# Patient Record
Sex: MALE | Race: OTHER | HISPANIC OR LATINO
[De-identification: names, ages, dates, MRNs, and addresses within clinical notes are randomized per-mention and may not be internally consistent; named-entity substitution may affect disease eponyms.]

---

## 2017-09-18 ENCOUNTER — APPOINTMENT (OUTPATIENT)
Dept: UROLOGY | Facility: CLINIC | Age: 57
End: 2017-09-18
Payer: MEDICAID

## 2017-09-18 VITALS
TEMPERATURE: 98.5 F | WEIGHT: 169 LBS | DIASTOLIC BLOOD PRESSURE: 72 MMHG | BODY MASS INDEX: 28.85 KG/M2 | SYSTOLIC BLOOD PRESSURE: 108 MMHG | HEART RATE: 71 BPM | HEIGHT: 64 IN

## 2017-09-18 DIAGNOSIS — F17.200 NICOTINE DEPENDENCE, UNSPECIFIED, UNCOMPLICATED: ICD-10-CM

## 2017-09-18 PROCEDURE — 99204 OFFICE O/P NEW MOD 45 MIN: CPT | Mod: 25

## 2017-09-18 PROCEDURE — 51798 US URINE CAPACITY MEASURE: CPT

## 2017-09-19 PROBLEM — F17.200 CURRENT EVERY DAY SMOKER: Status: ACTIVE | Noted: 2017-09-19

## 2017-09-19 LAB
APPEARANCE: CLEAR
BACTERIA: NEGATIVE
BILIRUBIN URINE: NEGATIVE
BLOOD URINE: NEGATIVE
COLOR: YELLOW
GLUCOSE QUALITATIVE U: 1000 MG/DL
KETONES URINE: NEGATIVE
LEUKOCYTE ESTERASE URINE: NEGATIVE
MICROSCOPIC-UA: NORMAL
NITRITE URINE: NEGATIVE
PH URINE: 6
PROTEIN URINE: NEGATIVE MG/DL
RED BLOOD CELLS URINE: 0 /HPF
SPECIFIC GRAVITY URINE: 1.04
SQUAMOUS EPITHELIAL CELLS: 0 /HPF
UROBILINOGEN URINE: NORMAL MG/DL
WHITE BLOOD CELLS URINE: 0 /HPF

## 2017-09-20 LAB — BACTERIA UR CULT: NORMAL

## 2017-11-20 ENCOUNTER — APPOINTMENT (OUTPATIENT)
Dept: UROLOGY | Facility: CLINIC | Age: 57
End: 2017-11-20
Payer: MEDICAID

## 2017-11-20 VITALS — TEMPERATURE: 99.1 F | SYSTOLIC BLOOD PRESSURE: 115 MMHG | HEART RATE: 69 BPM | DIASTOLIC BLOOD PRESSURE: 71 MMHG

## 2017-11-20 PROCEDURE — 99213 OFFICE O/P EST LOW 20 MIN: CPT

## 2018-03-05 ENCOUNTER — RX RENEWAL (OUTPATIENT)
Age: 58
End: 2018-03-05

## 2018-04-10 ENCOUNTER — APPOINTMENT (OUTPATIENT)
Dept: ORTHOPEDIC SURGERY | Facility: CLINIC | Age: 58
End: 2018-04-10
Payer: MEDICAID

## 2018-04-10 VITALS
DIASTOLIC BLOOD PRESSURE: 73 MMHG | HEIGHT: 68 IN | HEART RATE: 70 BPM | BODY MASS INDEX: 24.71 KG/M2 | WEIGHT: 163 LBS | SYSTOLIC BLOOD PRESSURE: 110 MMHG

## 2018-04-10 PROCEDURE — 20610 DRAIN/INJ JOINT/BURSA W/O US: CPT | Mod: LT

## 2018-04-10 PROCEDURE — 73564 X-RAY EXAM KNEE 4 OR MORE: CPT | Mod: LT

## 2018-04-10 PROCEDURE — 99204 OFFICE O/P NEW MOD 45 MIN: CPT | Mod: 25

## 2018-04-10 PROCEDURE — 73560 X-RAY EXAM OF KNEE 1 OR 2: CPT | Mod: RT

## 2018-05-08 ENCOUNTER — APPOINTMENT (OUTPATIENT)
Dept: ORTHOPEDIC SURGERY | Facility: CLINIC | Age: 58
End: 2018-05-08
Payer: MEDICAID

## 2018-05-08 VITALS
SYSTOLIC BLOOD PRESSURE: 133 MMHG | WEIGHT: 163 LBS | HEART RATE: 79 BPM | HEIGHT: 68 IN | BODY MASS INDEX: 24.71 KG/M2 | DIASTOLIC BLOOD PRESSURE: 82 MMHG

## 2018-05-08 DIAGNOSIS — Z78.9 OTHER SPECIFIED HEALTH STATUS: ICD-10-CM

## 2018-05-08 DIAGNOSIS — Z87.39 PERSONAL HISTORY OF OTHER DISEASES OF THE MUSCULOSKELETAL SYSTEM AND CONNECTIVE TISSUE: ICD-10-CM

## 2018-05-08 DIAGNOSIS — Z00.00 ENCOUNTER FOR GENERAL ADULT MEDICAL EXAMINATION W/OUT ABNORMAL FINDINGS: ICD-10-CM

## 2018-05-08 PROCEDURE — 99213 OFFICE O/P EST LOW 20 MIN: CPT

## 2018-05-08 PROCEDURE — 73562 X-RAY EXAM OF KNEE 3: CPT | Mod: RT

## 2018-05-22 ENCOUNTER — MESSAGE (OUTPATIENT)
Age: 58
End: 2018-05-22

## 2018-06-04 ENCOUNTER — APPOINTMENT (OUTPATIENT)
Dept: UROLOGY | Facility: CLINIC | Age: 58
End: 2018-06-04
Payer: MEDICAID

## 2018-06-04 VITALS — TEMPERATURE: 98.2 F | SYSTOLIC BLOOD PRESSURE: 146 MMHG | HEART RATE: 93 BPM | DIASTOLIC BLOOD PRESSURE: 80 MMHG

## 2018-06-04 PROCEDURE — 99213 OFFICE O/P EST LOW 20 MIN: CPT

## 2018-06-05 LAB
PSA FREE FLD-MCNC: 38.6
PSA FREE SERPL-MCNC: 0.22 NG/ML
PSA SERPL-MCNC: 0.57 NG/ML

## 2018-06-07 ENCOUNTER — RX RENEWAL (OUTPATIENT)
Age: 58
End: 2018-06-07

## 2018-09-18 ENCOUNTER — APPOINTMENT (OUTPATIENT)
Dept: ORTHOPEDIC SURGERY | Facility: CLINIC | Age: 58
End: 2018-09-18
Payer: MEDICAID

## 2018-09-18 VITALS
DIASTOLIC BLOOD PRESSURE: 71 MMHG | HEART RATE: 75 BPM | SYSTOLIC BLOOD PRESSURE: 108 MMHG | BODY MASS INDEX: 27.83 KG/M2 | HEIGHT: 64 IN | WEIGHT: 163 LBS

## 2018-09-18 DIAGNOSIS — M23.92 UNSPECIFIED INTERNAL DERANGEMENT OF LEFT KNEE: ICD-10-CM

## 2018-09-18 PROCEDURE — 99213 OFFICE O/P EST LOW 20 MIN: CPT

## 2018-10-02 ENCOUNTER — RESULT REVIEW (OUTPATIENT)
Age: 58
End: 2018-10-02

## 2018-10-16 ENCOUNTER — APPOINTMENT (OUTPATIENT)
Dept: ORTHOPEDIC SURGERY | Facility: CLINIC | Age: 58
End: 2018-10-16
Payer: MEDICAID

## 2018-10-16 VITALS
WEIGHT: 162 LBS | SYSTOLIC BLOOD PRESSURE: 118 MMHG | HEIGHT: 64 IN | DIASTOLIC BLOOD PRESSURE: 68 MMHG | HEART RATE: 81 BPM | BODY MASS INDEX: 27.66 KG/M2 | RESPIRATION RATE: 98 BRPM

## 2018-10-16 PROCEDURE — 99214 OFFICE O/P EST MOD 30 MIN: CPT

## 2018-12-17 ENCOUNTER — APPOINTMENT (OUTPATIENT)
Dept: UROLOGY | Facility: CLINIC | Age: 58
End: 2018-12-17

## 2019-01-03 VITALS
HEART RATE: 64 BPM | TEMPERATURE: 98 F | DIASTOLIC BLOOD PRESSURE: 60 MMHG | WEIGHT: 141.1 LBS | SYSTOLIC BLOOD PRESSURE: 100 MMHG | RESPIRATION RATE: 16 BRPM | HEIGHT: 64 IN

## 2019-01-03 NOTE — ASU PATIENT PROFILE, ADULT - PAIN DESCRIPTION (FREQUENCY/QUALITY), PROFILE
Pt with recent cold symptoms, possibly with fever on Sunday; pt did not eat bfast this morning.     Today at school pt was on stairs and states that everything \"went black\", when she woke up she was at the bottom of the stairs (about 8-10 stairs); pt c/o
intermittent

## 2019-01-03 NOTE — ASU PATIENT PROFILE, ADULT - LONGEST PERIOD TOBACCO-FREE
smoker socially currently , quit smoking 30 yrs ago, started smoking 9 yrs old , 4-5 cigarettes a day

## 2019-01-03 NOTE — ASU PATIENT PROFILE, ADULT - PMH
Allergic rhinitis    Arthritis    BPH (benign prostatic hyperplasia)    Cataract    Cough variant asthma  pt denies  Depression    DM (diabetes mellitus)  type 2  ED (erectile dysfunction)    Esophageal reflux    Essential tremor    HLD (hyperlipidemia)    HTN (hypertension)    Hypertensive retinopathy    Hypertriglyceridemia    Rectal bleeding

## 2019-01-04 ENCOUNTER — APPOINTMENT (OUTPATIENT)
Dept: ORTHOPEDIC SURGERY | Facility: HOSPITAL | Age: 59
End: 2019-01-04

## 2019-01-04 ENCOUNTER — RESULT REVIEW (OUTPATIENT)
Age: 59
End: 2019-01-04

## 2019-01-04 ENCOUNTER — OUTPATIENT (OUTPATIENT)
Dept: OUTPATIENT SERVICES | Facility: HOSPITAL | Age: 59
LOS: 1 days | Discharge: HOME | End: 2019-01-04
Payer: MEDICAID

## 2019-01-04 VITALS
HEART RATE: 62 BPM | SYSTOLIC BLOOD PRESSURE: 101 MMHG | RESPIRATION RATE: 17 BRPM | DIASTOLIC BLOOD PRESSURE: 66 MMHG | OXYGEN SATURATION: 96 %

## 2019-01-04 DIAGNOSIS — Z98.890 OTHER SPECIFIED POSTPROCEDURAL STATES: Chronic | ICD-10-CM

## 2019-01-04 DIAGNOSIS — M17.12 UNILATERAL PRIMARY OSTEOARTHRITIS, LEFT KNEE: ICD-10-CM

## 2019-01-04 DIAGNOSIS — E11.9 TYPE 2 DIABETES MELLITUS WITHOUT COMPLICATIONS: ICD-10-CM

## 2019-01-04 DIAGNOSIS — Z41.9 ENCOUNTER FOR PROCEDURE FOR PURPOSES OTHER THAN REMEDYING HEALTH STATE, UNSPECIFIED: Chronic | ICD-10-CM

## 2019-01-04 DIAGNOSIS — E78.1 PURE HYPERGLYCERIDEMIA: ICD-10-CM

## 2019-01-04 DIAGNOSIS — Z90.49 ACQUIRED ABSENCE OF OTHER SPECIFIED PARTS OF DIGESTIVE TRACT: Chronic | ICD-10-CM

## 2019-01-04 DIAGNOSIS — F41.1 GENERALIZED ANXIETY DISORDER: ICD-10-CM

## 2019-01-04 DIAGNOSIS — S83.232A COMPLEX TEAR OF MEDIAL MENISCUS, CURRENT INJURY, LEFT KNEE, INITIAL ENCOUNTER: ICD-10-CM

## 2019-01-04 DIAGNOSIS — G25.0 ESSENTIAL TREMOR: ICD-10-CM

## 2019-01-04 LAB
GLUCOSE BLDC GLUCOMTR-MCNC: 160 MG/DL — HIGH (ref 70–99)
GLUCOSE BLDC GLUCOMTR-MCNC: 213 MG/DL — HIGH (ref 70–99)

## 2019-01-04 PROCEDURE — 88304 TISSUE EXAM BY PATHOLOGIST: CPT

## 2019-01-04 PROCEDURE — 82962 GLUCOSE BLOOD TEST: CPT

## 2019-01-04 PROCEDURE — 29881 ARTHRS KNE SRG MNISECTMY M/L: CPT | Mod: LT

## 2019-01-04 RX ORDER — OXYCODONE AND ACETAMINOPHEN 5; 325 MG/1; MG/1
1 TABLET ORAL EVERY 4 HOURS
Qty: 0 | Refills: 0 | Status: DISCONTINUED | OUTPATIENT
Start: 2019-01-04 | End: 2019-01-04

## 2019-01-04 RX ORDER — MORPHINE SULFATE 50 MG/1
4 CAPSULE, EXTENDED RELEASE ORAL
Qty: 0 | Refills: 0 | Status: DISCONTINUED | OUTPATIENT
Start: 2019-01-04 | End: 2019-01-04

## 2019-01-04 RX ORDER — NABUMETONE 750 MG
1 TABLET ORAL
Qty: 14 | Refills: 0
Start: 2019-01-04 | End: 2019-01-10

## 2019-01-04 RX ORDER — OXYCODONE AND ACETAMINOPHEN 5; 325 MG/1; MG/1
2 TABLET ORAL EVERY 4 HOURS
Qty: 0 | Refills: 0 | Status: DISCONTINUED | OUTPATIENT
Start: 2019-01-04 | End: 2019-01-04

## 2019-01-04 RX ORDER — SODIUM CHLORIDE 9 MG/ML
1000 INJECTION, SOLUTION INTRAVENOUS
Qty: 0 | Refills: 0 | Status: DISCONTINUED | OUTPATIENT
Start: 2019-01-04 | End: 2019-01-04

## 2019-01-04 RX ORDER — ONDANSETRON 8 MG/1
4 TABLET, FILM COATED ORAL ONCE
Qty: 0 | Refills: 0 | Status: DISCONTINUED | OUTPATIENT
Start: 2019-01-04 | End: 2019-01-04

## 2019-01-04 NOTE — PACU DISCHARGE NOTE - COMMENTS
VSS. No acute distress. Denies pain. Dischearge instructions given. Pt shoes understanding well. Escorted home with daughter.

## 2019-01-04 NOTE — CONSULT NOTE ADULT - SUBJECTIVE AND OBJECTIVE BOX
HPI:  58 year old male with left knee torn meniscus with moderate left knee pain and swelling.  MRI confirmed diagnosis and osteoarthritis.  Symptoms worse with stairs and after prolonged immobility and persist over time.      PAST MEDICAL & SURGICAL HISTORY:  Arthritis  Depression  BPH (benign prostatic hyperplasia)  Cough variant asthma: pt denies  Essential tremor  Esophageal reflux  DM (diabetes mellitus): type 2  HTN (hypertension)  HLD (hyperlipidemia)  ED (erectile dysfunction)  Cataract  Hypertensive retinopathy  Rectal bleeding  Allergic rhinitis  Hypertriglyceridemia  H/O esophagogastroduodenoscopy  H/O colonoscopy  S/P knee surgery: arthroscopy  S/P appendectomy  Surgery, elective: ankle ligament reconstruction      REVIEW OF SYSTEMS    General:  normal  Skin/Breast: normal  Ophthalmologic: negative  ENMT:	normal  Respiratory and Thorax: normal  Cardiovascular:	normal  Gastrointestinal:	normal  Genitourinary:	normal  Musculoskeletal:	 left knee swelling   Neurological:	normal  Psychiatric:	normal  Hematology/Lymphatics:	 negative  Endocrine:	negative  Allergic/Immunologic:	negative      MEDICATIONS     antara 130mg daily  amaryl 4mg 2 tables daily  jardiance 25mg daily  lisinopril 10 mg daily  metformin 850 mg BID  inderal 20mg TID  zantac 300mg daily  welchol 3.75 g pack daily  ambien 10mg hs prn    Allergies    No Known Allergies     SOCIAL HISTORY: no cigs social alcohol    FAMILY HISTORY: non contributory    PHYSICAL EXAM:  Daily Height in cm: 162.56 (03 Jan 2019 15:18)       Vital Signs Last 24 Hrs  T(C): 36.9 (03 Jan 2019 15:18), Max: 36.9 (03 Jan 2019 15:18)  T(F): 98.4 (03 Jan 2019 15:18), Max: 98.4 (03 Jan 2019 15:18)  HR: 64 (03 Jan 2019 15:18) (64 - 64)  BP: 100/60 (03 Jan 2019 15:18) (100/60 - 100/60)  BP(mean): --  RR: 16 (03 Jan 2019 15:18) (16 - 16)  SpO2: --    Constitutional: WDWNM in NAD  Eyes: conj pale  ENMT: negative  Neck: supple  Breasts: not examined   Back: negative  Respiratory: clear to P&A  Cardiovascular: no MRGT or H  Gastrointestinal: normal bowel sounds  Genitourinary: neg  Rectal: not examined  Extremities: normal  Vascular: normal  Neurological: normal  Skin: negative  Lymph Nodes: negative  Musculoskeletal:   decreased ROM  left knee  Psychiatric: anxiety

## 2019-01-07 PROBLEM — N52.9 MALE ERECTILE DYSFUNCTION, UNSPECIFIED: Chronic | Status: ACTIVE | Noted: 2019-01-03

## 2019-01-07 PROBLEM — F32.9 MAJOR DEPRESSIVE DISORDER, SINGLE EPISODE, UNSPECIFIED: Chronic | Status: ACTIVE | Noted: 2019-01-03

## 2019-01-07 PROBLEM — M19.90 UNSPECIFIED OSTEOARTHRITIS, UNSPECIFIED SITE: Chronic | Status: ACTIVE | Noted: 2019-01-03

## 2019-01-07 PROBLEM — K62.5 HEMORRHAGE OF ANUS AND RECTUM: Chronic | Status: ACTIVE | Noted: 2019-01-03

## 2019-01-07 PROBLEM — E11.9 TYPE 2 DIABETES MELLITUS WITHOUT COMPLICATIONS: Chronic | Status: ACTIVE | Noted: 2019-01-03

## 2019-01-07 PROBLEM — E78.5 HYPERLIPIDEMIA, UNSPECIFIED: Chronic | Status: ACTIVE | Noted: 2019-01-03

## 2019-01-07 PROBLEM — H35.039 HYPERTENSIVE RETINOPATHY, UNSPECIFIED EYE: Chronic | Status: ACTIVE | Noted: 2019-01-03

## 2019-01-07 PROBLEM — I10 ESSENTIAL (PRIMARY) HYPERTENSION: Chronic | Status: ACTIVE | Noted: 2019-01-03

## 2019-01-07 PROBLEM — G25.0 ESSENTIAL TREMOR: Chronic | Status: ACTIVE | Noted: 2019-01-03

## 2019-01-07 PROBLEM — J30.9 ALLERGIC RHINITIS, UNSPECIFIED: Chronic | Status: ACTIVE | Noted: 2019-01-03

## 2019-01-07 PROBLEM — K21.9 GASTRO-ESOPHAGEAL REFLUX DISEASE WITHOUT ESOPHAGITIS: Chronic | Status: ACTIVE | Noted: 2019-01-03

## 2019-01-07 PROBLEM — N40.0 BENIGN PROSTATIC HYPERPLASIA WITHOUT LOWER URINARY TRACT SYMPTOMS: Chronic | Status: ACTIVE | Noted: 2019-01-03

## 2019-01-07 PROBLEM — E78.1 PURE HYPERGLYCERIDEMIA: Chronic | Status: ACTIVE | Noted: 2019-01-03

## 2019-01-07 PROBLEM — H26.9 UNSPECIFIED CATARACT: Chronic | Status: ACTIVE | Noted: 2019-01-03

## 2019-01-07 PROBLEM — J45.991 COUGH VARIANT ASTHMA: Chronic | Status: ACTIVE | Noted: 2019-01-03

## 2019-01-07 LAB — SURGICAL PATHOLOGY STUDY: SIGNIFICANT CHANGE UP

## 2019-01-09 ENCOUNTER — APPOINTMENT (OUTPATIENT)
Dept: UROLOGY | Facility: CLINIC | Age: 59
End: 2019-01-09
Payer: MEDICAID

## 2019-01-09 VITALS — DIASTOLIC BLOOD PRESSURE: 71 MMHG | SYSTOLIC BLOOD PRESSURE: 117 MMHG | HEART RATE: 77 BPM | TEMPERATURE: 98.9 F

## 2019-01-09 PROCEDURE — 99213 OFFICE O/P EST LOW 20 MIN: CPT | Mod: 25

## 2019-01-09 PROCEDURE — 51798 US URINE CAPACITY MEASURE: CPT

## 2019-01-10 NOTE — LETTER BODY
[Dear  ___] : Dear  [unfilled], [Courtesy Letter:] : I had the pleasure of seeing your patient, [unfilled], in my office today. [Please see my note below.] : Please see my note below. [Consult Closing:] : Thank you very much for allowing me to participate in the care of this patient.  If you have any questions, please do not hesitate to contact me. [Sincerely,] : Sincerely, [FreeTextEntry2] : Humphrey Wright MD\par [address]\[place] [address]  [FreeTextEntry3] : Leonel Ortiz MD

## 2019-01-10 NOTE — ASSESSMENT
[FreeTextEntry1] : 59yo male with bothersome BPH symptoms \par Good response to tamsulosin \par Moderately high residual today, discussed other options including finasteride or evaluation of surgical options\par He would like to continue tamsulosin\par F/u 3 months\par

## 2019-01-10 NOTE — PHYSICAL EXAM
[General Appearance - Well Developed] : well developed [General Appearance - Well Nourished] : well nourished [Normal Appearance] : normal appearance [Well Groomed] : well groomed [General Appearance - In No Acute Distress] : no acute distress [Abdomen Soft] : soft [Abdomen Tenderness] : non-tender [Costovertebral Angle Tenderness] : no ~M costovertebral angle tenderness [Oriented To Time, Place, And Person] : oriented to person, place, and time [Affect] : the affect was normal [Mood] : the mood was normal [Not Anxious] : not anxious [Normal Station and Gait] : the gait and station were normal for the patient's age [No Focal Deficits] : no focal deficits [FreeTextEntry1] : Prior LADARIUS = normal prostate; PVR = 142cc

## 2019-01-10 NOTE — HISTORY OF PRESENT ILLNESS
[None] : None [FreeTextEntry1] : This is a pleasant 58yo male here for evaluation of lower urinary tract symptoms. He complains of nocturia, straining, frequency. Symptoms have been present for about 6 months. He was told his recent PSA was normal. He has had no prior treatment for BPH. Past history is significant for diabetes. \par \par 11/20/17 Here for f/u. Started on tamsulosin in September, BPH symptoms have markedly improved. \par \par 6/04/18 Here for f/u. No significant urinary complaints. Experiencing retrograde ejaculation, likely from tamsulosin use, not bothersome. Interested in discussing treatment options for ED. \par \par 1/09/19 Here for f/u. C/o sensation of incomplete emptying, double voiding.

## 2019-01-22 ENCOUNTER — APPOINTMENT (OUTPATIENT)
Dept: ORTHOPEDIC SURGERY | Facility: CLINIC | Age: 59
End: 2019-01-22
Payer: MEDICAID

## 2019-01-22 VITALS — WEIGHT: 162 LBS | HEIGHT: 64 IN | BODY MASS INDEX: 27.66 KG/M2

## 2019-01-22 PROCEDURE — 99024 POSTOP FOLLOW-UP VISIT: CPT

## 2019-01-22 NOTE — ADDENDUM
[FreeTextEntry1] : Documented by Christine Ruano, solely acting as a scribe for Dr. Cody Holland on 01/22/2019.\par \par All medical record entries made by the Scribe were at my, Dr. Cody Holland, direction and personally dictated by me on 01/22/2019 . I have reviewed the chart and agree that the record accurately reflects my personal performance of the history, physical exam, assessment and plan. I have also personally directed, reviewed, and agreed with the chart.

## 2019-01-22 NOTE — HISTORY OF PRESENT ILLNESS
[___ Weeks Post Op] : [unfilled] weeks post op [Clean/Dry/Intact] : clean, dry and intact [Healed] : healed [Neuro Intact] : an unremarkable neurological exam [Vascular Intact] : ~T peripheral vascular exam normal [Doing Well] : is doing well [Excellent Pain Control] : has excellent pain control [No Sign of Infection] : is showing no signs of infection [Chills] : no chills [Constipation] : no constipation [Diarrhea] : no diarrhea [Dysuria] : no dysuria [Fever] : no fever [Nausea] : no nausea [Vomiting] : no vomiting [Erythema] : not erythematous [Discharge] : absent of discharge [Swelling] : not swollen [Dehiscence] : not dehisced [de-identified] : s/p L knee arthroscopy, partial  medial meniscectomy, chondroplasty of patella, trochlea, and medial femoral condyle, DOS: 01/04/19. [de-identified] : 59 year old male presents to the office s/p L knee arthroscopy, partial  medial meniscectomy, chondroplasty of patella, trochlea, and medial femoral condyle, DOS: 01/04/19 for a post-operative evaluation. Patient is doing generally well and denies any current complaints. He is satisfied with his postoperative progress, noting that pain is well controlled. Patient is ambulating freely at this time. He has been compliant with PT. [de-identified] : Left knee: FROM hip, portal incisions well healed,  no effusion, 0 - 130 degrees, no crepitus, no medial pain, no lateral pain, no Lachman, no pivot shift, no anterior or posterior drawer, stable, anatomic alignment, no signs of infection, no signs of blood clot.  [de-identified] : No new imaging reviewed today.

## 2019-01-22 NOTE — PROCEDURE
[de-identified] : 59 year old male presents to the office s/p L knee arthroscopy, partial  medial meniscectomy, chondroplasty of patella, trochlea, and medial femoral condyle, DOS: 01/04/19 for a post-operative evaluation. Patient is doing generally well and denies any current complaints. He is satisfied with his postoperative progress, noting that pain is well controlled. Patient is ambulating freely at this time. He has been compliant with PT. Patient's physical exam is unremarkable, and shows no sign of blood clot or infection. I reassured the patient that any remaining residual discomfort will lessen with time. At this point, I recommend that he follow up in 6 weeks.

## 2019-01-28 ENCOUNTER — OTHER (OUTPATIENT)
Age: 59
End: 2019-01-28

## 2019-03-28 ENCOUNTER — APPOINTMENT (OUTPATIENT)
Dept: ORTHOPEDIC SURGERY | Facility: CLINIC | Age: 59
End: 2019-03-28
Payer: MEDICAID

## 2019-03-28 VITALS — BODY MASS INDEX: 27.66 KG/M2 | HEIGHT: 64 IN | WEIGHT: 162 LBS

## 2019-03-28 DIAGNOSIS — S83.232D COMPLEX TEAR OF MEDIAL MENISCUS, CURRENT INJURY, LEFT KNEE, SUBSEQUENT ENCOUNTER: ICD-10-CM

## 2019-03-28 PROCEDURE — 99024 POSTOP FOLLOW-UP VISIT: CPT

## 2019-03-28 NOTE — HISTORY OF PRESENT ILLNESS
[___ Weeks Post Op] : [unfilled] weeks post op [Clean/Dry/Intact] : clean, dry and intact [Healed] : healed [Neuro Intact] : an unremarkable neurological exam [Vascular Intact] : ~T peripheral vascular exam normal [Doing Well] : is doing well [Excellent Pain Control] : has excellent pain control [No Sign of Infection] : is showing no signs of infection [Chills] : no chills [Constipation] : no constipation [Diarrhea] : no diarrhea [Dysuria] : no dysuria [Fever] : no fever [Nausea] : no nausea [Vomiting] : no vomiting [Erythema] : not erythematous [Discharge] : absent of discharge [Swelling] : not swollen [Dehiscence] : not dehisced [de-identified] : s/p left knee arthroscopy, partial  medial meniscectomy, chondroplasty of patella, trochlea, and medial femoral condyle, DOS: 01/04/19. [de-identified] : 59 year old male presents to the office s/p left knee arthroscopy, partial  medial meniscectomy, chondroplasty of patella, trochlea, and medial femoral condyle, DOS: 01/04/19 for a post-operative evaluation. Patient is doing generally well and denies any current complaints. He is satisfied with his postoperative progress, noting that pain is well controlled. Patient is ambulating freely at this time. [de-identified] : Left knee: FROM hip, portal incisions well healed,  minimal effusion, 0 - 130 degrees, mild crepitus, no medial pain, no lateral pain, no Lachman, no pivot shift, no anterior or posterior drawer, stable, anatomic alignment, no signs of infection, no signs of blood clot.  [de-identified] : No new imaging reviewed today.

## 2019-03-28 NOTE — PROCEDURE
[de-identified] : 59 year old male presents to the office s/p left knee arthroscopy, partial  medial meniscectomy, chondroplasty of patella, trochlea, and medial femoral condyle, DOS: 01/04/19 for a post-operative evaluation. Patient is doing generally well and denies any current complaints. He is satisfied with his postoperative progress, noting that pain is well controlled. Patient is ambulating freely at this time. His physical exam is unremarkable, and shows no sign of blood clot or infection. I reassured the patient that any remaining residual discomfort will lessen with time. At this point, I recommend that he continue with home exercises for further strengthening of the surrounding musculature of the knee. Information regarding the new office was provided to the patient, and he was advised to contact the office if any questions remain. As the patient's current insurance is not accepted in the new office, a referral will be provided to Dr. Richardson in the case he requires further treatment.

## 2019-03-28 NOTE — ADDENDUM
[FreeTextEntry1] : Documented by Christine Ruano, solely acting as a scribe for Dr. Cody Holland on 03/28/2019.\par \par All medical record entries made by the Scribe were at my, Dr. Cody Holland, direction and personally dictated by me on 03/28/2019. I have reviewed the chart and agree that the record accurately reflects my personal performance of the history, physical exam, assessment and plan. I have also personally directed, reviewed, and agreed with the chart.

## 2019-04-04 PROBLEM — S83.232D COMPLEX TEAR OF MEDIAL MENISCUS OF LEFT KNEE AS CURRENT INJURY, SUBSEQUENT ENCOUNTER: Status: ACTIVE | Noted: 2018-10-16

## 2019-04-17 ENCOUNTER — APPOINTMENT (OUTPATIENT)
Dept: UROLOGY | Facility: CLINIC | Age: 59
End: 2019-04-17
Payer: MEDICAID

## 2019-04-17 VITALS
BODY MASS INDEX: 27.66 KG/M2 | SYSTOLIC BLOOD PRESSURE: 116 MMHG | HEART RATE: 73 BPM | HEIGHT: 64 IN | OXYGEN SATURATION: 98 % | TEMPERATURE: 98.7 F | DIASTOLIC BLOOD PRESSURE: 73 MMHG | WEIGHT: 162 LBS

## 2019-04-17 LAB
APPEARANCE: CLEAR
BACTERIA: NEGATIVE
BILIRUBIN URINE: NEGATIVE
BLOOD URINE: NEGATIVE
COLOR: YELLOW
GLUCOSE QUALITATIVE U: ABNORMAL
HYALINE CASTS: 0 /LPF
KETONES URINE: NEGATIVE
LEUKOCYTE ESTERASE URINE: NEGATIVE
MICROSCOPIC-UA: NORMAL
NITRITE URINE: NEGATIVE
PH URINE: 6.5
PROTEIN URINE: NEGATIVE
PSA FREE FLD-MCNC: 38 %
PSA FREE SERPL-MCNC: 0.2 NG/ML
PSA SERPL-MCNC: 0.54 NG/ML
RED BLOOD CELLS URINE: 0 /HPF
SPECIFIC GRAVITY URINE: 1.03
SQUAMOUS EPITHELIAL CELLS: 0 /HPF
UROBILINOGEN URINE: NORMAL
WHITE BLOOD CELLS URINE: 0 /HPF

## 2019-04-17 PROCEDURE — 99213 OFFICE O/P EST LOW 20 MIN: CPT | Mod: 25

## 2019-04-17 PROCEDURE — 51798 US URINE CAPACITY MEASURE: CPT

## 2019-04-17 NOTE — PHYSICAL EXAM
[General Appearance - Well Developed] : well developed [General Appearance - Well Nourished] : well nourished [Normal Appearance] : normal appearance [Well Groomed] : well groomed [General Appearance - In No Acute Distress] : no acute distress [Abdomen Soft] : soft [Abdomen Tenderness] : non-tender [Costovertebral Angle Tenderness] : no ~M costovertebral angle tenderness [Prostate Enlargement] : the prostate was not enlarged [No Prostate Nodules] : no prostate nodules [Prostate Tenderness] : the prostate was not tender [FreeTextEntry1] : PVR = 0cc [Oriented To Time, Place, And Person] : oriented to person, place, and time [Affect] : the affect was normal [Mood] : the mood was normal [Normal Station and Gait] : the gait and station were normal for the patient's age [Not Anxious] : not anxious [No Focal Deficits] : no focal deficits

## 2019-04-17 NOTE — LETTER BODY
[Dear  ___] : Dear  [unfilled], [Please see my note below.] : Please see my note below. [Courtesy Letter:] : I had the pleasure of seeing your patient, [unfilled], in my office today. [Consult Closing:] : Thank you very much for allowing me to participate in the care of this patient.  If you have any questions, please do not hesitate to contact me. [Sincerely,] : Sincerely, [FreeTextEntry3] : Leonel Ortiz MD [FreeTextEntry2] : Humphrey Wright MD\par [address]\[place] [address]

## 2019-04-17 NOTE — ASSESSMENT
[FreeTextEntry1] : 60yo male with bothersome BPH symptoms, mostly obstructive\par Normal LADARIUS, PSA\par Residual is zero today \par Recommend cystoscopy for further evaluation\par Reviewed procedure, indications and risks\par

## 2019-04-17 NOTE — HISTORY OF PRESENT ILLNESS
[FreeTextEntry1] : This is a pleasant 58yo male here for evaluation of lower urinary tract symptoms. He complains of nocturia, straining, frequency. Symptoms have been present for about 6 months. He was told his recent PSA was normal. He has had no prior treatment for BPH. Past history is significant for diabetes. \par \par 11/20/17 Here for f/u. Started on tamsulosin in September, BPH symptoms have markedly improved. \par \par 6/04/18 Here for f/u. No significant urinary complaints. Experiencing retrograde ejaculation, likely from tamsulosin use, not bothersome. Interested in discussing treatment options for ED. \par \par 1/09/19 Here for f/u. C/o sensation of incomplete emptying, double voiding. \par \par 4/17/19 Here for f/u. Voiding symptoms becoming more bothersome, mostly obstructive symptoms.  [Nocturia] : nocturia [Straining] : straining [Weak Stream] : weak stream [Intermittency] : intermittency [Dysuria] : no dysuria [Hematuria - Gross] : no gross hematuria [None] : None

## 2019-04-18 ENCOUNTER — RESULT REVIEW (OUTPATIENT)
Age: 59
End: 2019-04-18

## 2019-04-18 LAB — BACTERIA UR CULT: NORMAL

## 2019-05-08 ENCOUNTER — APPOINTMENT (OUTPATIENT)
Dept: UROLOGY | Facility: CLINIC | Age: 59
End: 2019-05-08
Payer: MEDICAID

## 2019-05-08 VITALS — HEART RATE: 82 BPM | TEMPERATURE: 98.6 F | DIASTOLIC BLOOD PRESSURE: 63 MMHG | SYSTOLIC BLOOD PRESSURE: 102 MMHG

## 2019-05-08 PROCEDURE — 52000 CYSTOURETHROSCOPY: CPT

## 2019-05-09 LAB
ALBUMIN SERPL ELPH-MCNC: 4.8 G/DL
ALP BLD-CCNC: 82 U/L
ALT SERPL-CCNC: 26 U/L
ANION GAP SERPL CALC-SCNC: 12 MMOL/L
APPEARANCE: CLEAR
APTT BLD: 29.2 SEC
AST SERPL-CCNC: 17 U/L
BACTERIA UR CULT: NORMAL
BACTERIA: NEGATIVE
BASOPHILS # BLD AUTO: 0.03 K/UL
BASOPHILS NFR BLD AUTO: 0.7 %
BILIRUB SERPL-MCNC: 0.3 MG/DL
BILIRUBIN URINE: NEGATIVE
BLOOD URINE: NEGATIVE
BUN SERPL-MCNC: 20 MG/DL
CALCIUM SERPL-MCNC: 10 MG/DL
CHLORIDE SERPL-SCNC: 99 MMOL/L
CO2 SERPL-SCNC: 24 MMOL/L
COLOR: NORMAL
CREAT SERPL-MCNC: 0.86 MG/DL
EOSINOPHIL # BLD AUTO: 0.07 K/UL
EOSINOPHIL NFR BLD AUTO: 1.6 %
GLUCOSE QUALITATIVE U: ABNORMAL
GLUCOSE SERPL-MCNC: 350 MG/DL
HCT VFR BLD CALC: 41.3 %
HGB BLD-MCNC: 13.5 G/DL
HYALINE CASTS: 0 /LPF
IMM GRANULOCYTES NFR BLD AUTO: 0.2 %
INR PPP: 0.86 RATIO
KETONES URINE: NEGATIVE
LEUKOCYTE ESTERASE URINE: NEGATIVE
LYMPHOCYTES # BLD AUTO: 1.15 K/UL
LYMPHOCYTES NFR BLD AUTO: 26.1 %
MAN DIFF?: NORMAL
MCHC RBC-ENTMCNC: 31.5 PG
MCHC RBC-ENTMCNC: 32.7 GM/DL
MCV RBC AUTO: 96.5 FL
MICROSCOPIC-UA: NORMAL
MONOCYTES # BLD AUTO: 0.4 K/UL
MONOCYTES NFR BLD AUTO: 9.1 %
NEUTROPHILS # BLD AUTO: 2.75 K/UL
NEUTROPHILS NFR BLD AUTO: 62.3 %
NITRITE URINE: NEGATIVE
PH URINE: 6
PLATELET # BLD AUTO: 297 K/UL
POTASSIUM SERPL-SCNC: 4.6 MMOL/L
PROT SERPL-MCNC: 7.2 G/DL
PROTEIN URINE: NEGATIVE
PT BLD: 9.8 SEC
RBC # BLD: 4.28 M/UL
RBC # FLD: 13.2 %
RED BLOOD CELLS URINE: 0 /HPF
SODIUM SERPL-SCNC: 135 MMOL/L
SPECIFIC GRAVITY URINE: 1.03
SQUAMOUS EPITHELIAL CELLS: 0 /HPF
UROBILINOGEN URINE: NORMAL
WBC # FLD AUTO: 4.41 K/UL
WHITE BLOOD CELLS URINE: 0 /HPF

## 2019-05-12 ENCOUNTER — FORM ENCOUNTER (OUTPATIENT)
Age: 59
End: 2019-05-12

## 2019-05-13 ENCOUNTER — OUTPATIENT (OUTPATIENT)
Dept: OUTPATIENT SERVICES | Facility: HOSPITAL | Age: 59
LOS: 1 days | End: 2019-05-13
Payer: MEDICAID

## 2019-05-13 DIAGNOSIS — Z98.890 OTHER SPECIFIED POSTPROCEDURAL STATES: Chronic | ICD-10-CM

## 2019-05-13 DIAGNOSIS — Z90.49 ACQUIRED ABSENCE OF OTHER SPECIFIED PARTS OF DIGESTIVE TRACT: Chronic | ICD-10-CM

## 2019-05-13 DIAGNOSIS — Z41.9 ENCOUNTER FOR PROCEDURE FOR PURPOSES OTHER THAN REMEDYING HEALTH STATE, UNSPECIFIED: Chronic | ICD-10-CM

## 2019-05-13 PROCEDURE — 71046 X-RAY EXAM CHEST 2 VIEWS: CPT | Mod: 26

## 2019-05-13 PROCEDURE — 71046 X-RAY EXAM CHEST 2 VIEWS: CPT

## 2019-06-04 ENCOUNTER — APPOINTMENT (OUTPATIENT)
Dept: UROLOGY | Facility: AMBULATORY SURGERY CENTER | Age: 59
End: 2019-06-04

## 2019-06-04 ENCOUNTER — TRANSCRIPTION ENCOUNTER (OUTPATIENT)
Age: 59
End: 2019-06-04

## 2019-06-04 ENCOUNTER — OUTPATIENT (OUTPATIENT)
Dept: OUTPATIENT SERVICES | Facility: HOSPITAL | Age: 59
LOS: 1 days | Discharge: HOME | End: 2019-06-04
Payer: MEDICAID

## 2019-06-04 DIAGNOSIS — Z41.9 ENCOUNTER FOR PROCEDURE FOR PURPOSES OTHER THAN REMEDYING HEALTH STATE, UNSPECIFIED: Chronic | ICD-10-CM

## 2019-06-04 DIAGNOSIS — Z98.890 OTHER SPECIFIED POSTPROCEDURAL STATES: Chronic | ICD-10-CM

## 2019-06-04 DIAGNOSIS — Z90.49 ACQUIRED ABSENCE OF OTHER SPECIFIED PARTS OF DIGESTIVE TRACT: Chronic | ICD-10-CM

## 2019-06-04 LAB — GLUCOSE BLDC GLUCOMTR-MCNC: 176 MG/DL — HIGH (ref 70–99)

## 2019-06-04 PROCEDURE — 52442 CYSTO INS TRNSPRSTC IMPLT EA: CPT

## 2019-06-04 PROCEDURE — 52441 CYSTO INSJ TRNSPRSTC 1 IMPLT: CPT

## 2019-06-04 RX ORDER — OXYBUTYNIN CHLORIDE 5 MG
1 TABLET ORAL
Qty: 14 | Refills: 0
Start: 2019-06-04 | End: 2019-07-03

## 2019-06-04 RX ORDER — PHENAZOPYRIDINE HCL 100 MG
1 TABLET ORAL
Qty: 9 | Refills: 0
Start: 2019-06-04 | End: 2019-06-06

## 2019-06-04 RX ORDER — OXYBUTYNIN CHLORIDE 5 MG
1 TABLET ORAL
Qty: 14 | Refills: 0
Start: 2019-06-04 | End: 2019-06-17

## 2019-06-04 RX ORDER — OXYCODONE HYDROCHLORIDE 5 MG/1
1 TABLET ORAL
Qty: 5 | Refills: 0
Start: 2019-06-04

## 2019-06-05 ENCOUNTER — APPOINTMENT (OUTPATIENT)
Dept: UROLOGY | Facility: CLINIC | Age: 59
End: 2019-06-05
Payer: MEDICAID

## 2019-06-05 VITALS
OXYGEN SATURATION: 98 % | TEMPERATURE: 98.9 F | DIASTOLIC BLOOD PRESSURE: 74 MMHG | WEIGHT: 162 LBS | HEART RATE: 79 BPM | BODY MASS INDEX: 27.66 KG/M2 | SYSTOLIC BLOOD PRESSURE: 115 MMHG | HEIGHT: 64 IN

## 2019-06-05 LAB
CULTURE RESULTS: NO GROWTH — SIGNIFICANT CHANGE UP
SPECIMEN SOURCE: SIGNIFICANT CHANGE UP

## 2019-06-05 PROCEDURE — 99212 OFFICE O/P EST SF 10 MIN: CPT | Mod: 25

## 2019-06-05 PROCEDURE — 51798 US URINE CAPACITY MEASURE: CPT

## 2019-06-05 NOTE — HISTORY OF PRESENT ILLNESS
[FreeTextEntry1] : This is a pleasant 56yo male here for evaluation of lower urinary tract symptoms. He complains of nocturia, straining, frequency. Symptoms have been present for about 6 months. He was told his recent PSA was normal. He has had no prior treatment for BPH. Past history is significant for diabetes. \par \par 11/20/17 Here for f/u. Started on tamsulosin in September, BPH symptoms have markedly improved. \par \par 6/04/18 Here for f/u. No significant urinary complaints. Experiencing retrograde ejaculation, likely from tamsulosin use, not bothersome. Interested in discussing treatment options for ED. \par \par 1/09/19 Here for f/u. C/o sensation of incomplete emptying, double voiding. \par \par 4/17/19 Here for f/u. Voiding symptoms becoming more bothersome, mostly obstructive symptoms. \par \par 6/05/19 Underwent Urolift yesterday, failed TOV postop, here for catheter removal.  [Urinary Retention] : urinary retention [None] : None

## 2019-06-05 NOTE — LETTER BODY
[Dear  ___] : Dear  [unfilled], [Courtesy Letter:] : I had the pleasure of seeing your patient, [unfilled], in my office today. [Consult Closing:] : Thank you very much for allowing me to participate in the care of this patient.  If you have any questions, please do not hesitate to contact me. [Please see my note below.] : Please see my note below. [Sincerely,] : Sincerely, [FreeTextEntry3] : Leonel Ortiz MD [FreeTextEntry2] : Humphrey Wright MD\par [address]\[place] [address]

## 2019-06-05 NOTE — PHYSICAL EXAM
[General Appearance - Well Developed] : well developed [General Appearance - Well Nourished] : well nourished [Well Groomed] : well groomed [Normal Appearance] : normal appearance [General Appearance - In No Acute Distress] : no acute distress [Abdomen Tenderness] : non-tender [Abdomen Soft] : soft [Costovertebral Angle Tenderness] : no ~M costovertebral angle tenderness [Prostate Enlargement] : the prostate was not enlarged [No Prostate Nodules] : no prostate nodules [Prostate Tenderness] : the prostate was not tender [Oriented To Time, Place, And Person] : oriented to person, place, and time [FreeTextEntry1] : Urine light pink, voided after catheter removal. PVR = 0cc [Affect] : the affect was normal [Mood] : the mood was normal [Normal Station and Gait] : the gait and station were normal for the patient's age [Not Anxious] : not anxious [No Focal Deficits] : no focal deficits

## 2019-06-05 NOTE — ASSESSMENT
[FreeTextEntry1] : 60yo male with bothersome BPH symptoms, mostly obstructive\par s/p Urolift 6/04/19 \par Passed TOV today\par Instructions reviewed\par RTC 3 weeks\par

## 2019-06-26 ENCOUNTER — APPOINTMENT (OUTPATIENT)
Dept: UROLOGY | Facility: CLINIC | Age: 59
End: 2019-06-26

## 2019-07-15 ENCOUNTER — APPOINTMENT (OUTPATIENT)
Dept: UROLOGY | Facility: CLINIC | Age: 59
End: 2019-07-15
Payer: MEDICAID

## 2019-07-15 VITALS — HEART RATE: 73 BPM | SYSTOLIC BLOOD PRESSURE: 112 MMHG | TEMPERATURE: 98.2 F | DIASTOLIC BLOOD PRESSURE: 72 MMHG

## 2019-07-15 PROCEDURE — 99213 OFFICE O/P EST LOW 20 MIN: CPT

## 2019-07-15 RX ORDER — TAMSULOSIN HYDROCHLORIDE 0.4 MG/1
0.4 CAPSULE ORAL
Qty: 90 | Refills: 3 | Status: DISCONTINUED | COMMUNITY
Start: 2017-09-18 | End: 2019-07-15

## 2019-07-16 NOTE — ASSESSMENT
[FreeTextEntry1] : 60yo male with bothersome BPH symptoms, mostly obstructive\par s/p Urolift 6/04/19 \par Doing well, satisfied with voiding symptoms\par Will D/C tamsulosin\par F/u 4 months

## 2019-07-16 NOTE — LETTER BODY
[Dear  ___] : Dear  [unfilled], [Courtesy Letter:] : I had the pleasure of seeing your patient, [unfilled], in my office today. [Please see my note below.] : Please see my note below. [Consult Closing:] : Thank you very much for allowing me to participate in the care of this patient.  If you have any questions, please do not hesitate to contact me. [Sincerely,] : Sincerely, [FreeTextEntry2] : Humphrey Wright MD\par [address]\[place] [address]  [FreeTextEntry3] : Leonel Ortiz MD\par Urologic Oncology\par Department of Urology\par [facility]\par \par [employer] [facility], [place] \par \par

## 2019-07-16 NOTE — HISTORY OF PRESENT ILLNESS
[FreeTextEntry1] : This is a pleasant 56yo male here for evaluation of lower urinary tract symptoms. He complains of nocturia, straining, frequency. Symptoms have been present for about 6 months. He was told his recent PSA was normal. He has had no prior treatment for BPH. Past history is significant for diabetes. \par \par 11/20/17 Here for f/u. Started on tamsulosin in September, BPH symptoms have markedly improved. \par \par 6/04/18 Here for f/u. No significant urinary complaints. Experiencing retrograde ejaculation, likely from tamsulosin use, not bothersome. Interested in discussing treatment options for ED. \par \par 1/09/19 Here for f/u. C/o sensation of incomplete emptying, double voiding. \par \par 4/17/19 Here for f/u. Voiding symptoms becoming more bothersome, mostly obstructive symptoms. \par \par 6/05/19 Underwent Urolift yesterday, failed TOV postop, here for catheter removal. \par \par 7/15/19 Here for f/u. Doing well. Had about 3 weeks of dysuria, urgency postop but now resolved and voiding symptoms satisfactory.  [Nocturia] : no nocturia [Straining] : no straining [Weak Stream] : no weak stream [None] : None

## 2019-08-29 ENCOUNTER — APPOINTMENT (OUTPATIENT)
Dept: ORTHOPEDIC SURGERY | Facility: CLINIC | Age: 59
End: 2019-08-29
Payer: MEDICAID

## 2019-08-29 VITALS — WEIGHT: 162 LBS | BODY MASS INDEX: 27.66 KG/M2 | HEIGHT: 64 IN

## 2019-08-29 DIAGNOSIS — M17.12 UNILATERAL PRIMARY OSTEOARTHRITIS, LEFT KNEE: ICD-10-CM

## 2019-08-29 PROCEDURE — 99213 OFFICE O/P EST LOW 20 MIN: CPT

## 2019-08-29 NOTE — REVIEW OF SYSTEMS
[Joint Stiffness] : joint stiffness [Joint Pain] : joint pain [Depression] : depression [Negative] : Heme/Lymph

## 2019-08-29 NOTE — HISTORY OF PRESENT ILLNESS
[___ mths] : [unfilled] month(s) ago [Worsening] : worsening [Standing] : standing [Constant] : ~He/She~ states the symptoms seem to be constant [Sitting] : worsened by sitting [Bending] : worsened by bending [Walking] : worsened by walking [None] : No relieving factors are noted [de-identified] : Patient presents for evaluation of left knee pain. He states pain has been chronic for the last couple years but worse in the last few months. He has history of arthroscopy with Dr. Holland. He states he had partial relief f symptoms but complains today of pain in the back of the knee. He has tried Physcial therapy in the past which has helped. He had gel injection about one year ago with significant relief and he takes Naproxen prn. [Acetaminophen] : not relieved by acetaminophen [NSAIDs] : not relieved by nonsteroidal anti-inflammatory drugs [Physical Therapy] : not relieved by physical therapy

## 2019-08-29 NOTE — DISCUSSION/SUMMARY
[de-identified] : Diagnosis, prognosis and treatment options discussed. Conservative management including activity modification, therapeutic exercise with PT, topical and oral antiinflammatories, steroid and HA injections discussed. Patient understands the nature and progression of arthritis and that they will likely require knee replacement in the future. Patient will begin PT and may continue Naproxen prn. He declined an injection today. He may follow up prn.

## 2019-08-29 NOTE — ADDENDUM
[FreeTextEntry1] : Dr. Rao was physically present during the key portions the encounter, provided assistance as needed, and formulated the assessment and plan as documented above.\par \par

## 2019-08-29 NOTE — PHYSICAL EXAM
[de-identified] : Constitutional: Well appearing.  No acute distress.\par Mental Status: Alert & oriented to person, place and time. Normal affect.\par Pulmonary: No respiratory distress. Normal chest excursion.\par Abdomen: Soft and not distended.\par Gait: Normal.\par Ambulatory assist devices: None.\par \par Cervical spine: Skin intact. No visible deformity.  Painless active ROM without evident restriction.\par Bilateral upper extremities: Skin intact. No deformity. Painless active ROM without evident restriction.\par Thoracolumbar spine: No deformity. No tenderness. No radicular pain on passive straight leg raise bilaterally.\par Pelvis: No pelvic obliquity. No tenderness.\par Leg lengths: Equal.\par \par Bilateral Hips: No swelling or deformity. No focal tenderness. Painless and unrestricted range of motion.  No crepitation. Hip flexor and abductor power 5/5.\par \par Right Knee:\par Skin intact.\par No surgical scars.\par No erythema or ecchymosis.\par No swelling or effusion.\par No deformity.\par No focal tenderness.\par Painless and unrestricted range of motion.\par Central patellar tracking.\par No crepitation.\par No instability.\par Quadriceps power 5/5.\par \par Left Knee:\par Skin intact.\par Well healed arthroscopy scars.\par No erythema or ecchymosis.\par No swelling or effusion.\par No deformity.\par No focal tenderness.\par Mildly painful ROM from 5 degree flexion contracture to 125 degrees of flexion.\par Central patellar tracking.\par No crepitation.\par No instability.\par Quadriceps power 5/5.\par \par Neurological: Intact distal crude touch sensation. Normal distal motor power. Symmetric knee jerk and ankle jerk reflexes bilaterally.\par Cardiovascular: Palpable dorsalis pedis and posterior tibialis pulses. Brisk capillary refill. No peripheral edema.\par Lymphatics:  No peripheral adenopathy appreciated.\par \par  [de-identified] : Outside imaging done at [facility] reviewed demonstrate moderate to severe joint space narrowing with osteophyte formation and subchondral sclerosis

## 2019-09-10 ENCOUNTER — CLINICAL ADVICE (OUTPATIENT)
Age: 59
End: 2019-09-10

## 2019-09-10 DIAGNOSIS — R35.0 FREQUENCY OF MICTURITION: ICD-10-CM

## 2019-09-11 LAB
APPEARANCE: CLEAR
BACTERIA: NEGATIVE
BILIRUBIN URINE: NEGATIVE
BLOOD URINE: NEGATIVE
COLOR: NORMAL
GLUCOSE QUALITATIVE U: ABNORMAL
HYALINE CASTS: 0 /LPF
KETONES URINE: NEGATIVE
LEUKOCYTE ESTERASE URINE: NEGATIVE
MICROSCOPIC-UA: NORMAL
NITRITE URINE: NEGATIVE
PH URINE: 7
PROTEIN URINE: NEGATIVE
RED BLOOD CELLS URINE: 1 /HPF
SPECIFIC GRAVITY URINE: 1.03
SQUAMOUS EPITHELIAL CELLS: 0 /HPF
UROBILINOGEN URINE: NORMAL
WHITE BLOOD CELLS URINE: 0 /HPF

## 2019-09-12 ENCOUNTER — RESULT REVIEW (OUTPATIENT)
Age: 59
End: 2019-09-12

## 2019-09-12 LAB — BACTERIA UR CULT: NORMAL

## 2019-10-21 ENCOUNTER — APPOINTMENT (OUTPATIENT)
Dept: UROLOGY | Facility: CLINIC | Age: 59
End: 2019-10-21
Payer: MEDICAID

## 2019-10-21 VITALS — TEMPERATURE: 98.1 F | DIASTOLIC BLOOD PRESSURE: 82 MMHG | HEART RATE: 89 BPM | SYSTOLIC BLOOD PRESSURE: 143 MMHG

## 2019-10-21 PROCEDURE — 99213 OFFICE O/P EST LOW 20 MIN: CPT

## 2019-10-21 NOTE — HISTORY OF PRESENT ILLNESS
[FreeTextEntry1] : This is a pleasant 58yo male here for evaluation of lower urinary tract symptoms. He complains of nocturia, straining, frequency. Symptoms have been present for about 6 months. He was told his recent PSA was normal. He has had no prior treatment for BPH. Past history is significant for diabetes. \par \par 11/20/17 Here for f/u. Started on tamsulosin in September, BPH symptoms have markedly improved. \par \par 6/04/18 Here for f/u. No significant urinary complaints. Experiencing retrograde ejaculation, likely from tamsulosin use, not bothersome. Interested in discussing treatment options for ED. \par \par 1/09/19 Here for f/u. C/o sensation of incomplete emptying, double voiding. \par \par 4/17/19 Here for f/u. Voiding symptoms becoming more bothersome, mostly obstructive symptoms. \par \par 6/05/19 Underwent Urolift yesterday, failed TOV postop, here for catheter removal. \par \par 7/15/19 Here for f/u. Doing well. Had about 3 weeks of dysuria, urgency postop but now resolved and voiding symptoms satisfactory. \par \par 10/21/19 Here for f/u. Doing well, currently satisfied.  [Nocturia] : no nocturia [Straining] : no straining [Weak Stream] : no weak stream [None] : None

## 2019-10-21 NOTE — LETTER BODY
[Dear  ___] : Dear  [unfilled], [Courtesy Letter:] : I had the pleasure of seeing your patient, [unfilled], in my office today. [Please see my note below.] : Please see my note below. [Consult Closing:] : Thank you very much for allowing me to participate in the care of this patient.  If you have any questions, please do not hesitate to contact me. [Sincerely,] : Sincerely, [FreeTextEntry2] : Humphrey Wright MD\par [address]\[place] [address]  [FreeTextEntry3] : Leonel Ortiz MD\par Urologic Oncology\par Department of Urology\par [facility]\par \par [employer] [facility] \par \par

## 2019-10-21 NOTE — ASSESSMENT
[FreeTextEntry1] : 58yo male with bothersome BPH symptoms, mostly obstructive\par s/p Urolift 6/04/19 \par Doing well, satisfied with voiding symptoms, off BPH meds\par F/u 6 months

## 2020-04-04 ENCOUNTER — INPATIENT (INPATIENT)
Facility: HOSPITAL | Age: 60
LOS: 7 days | Discharge: ANOTHER HOSPITAL | DRG: 177 | End: 2020-04-12
Admitting: INTERNAL MEDICINE
Payer: MEDICAID

## 2020-04-04 VITALS
DIASTOLIC BLOOD PRESSURE: 88 MMHG | WEIGHT: 154.98 LBS | OXYGEN SATURATION: 82 % | RESPIRATION RATE: 18 BRPM | HEIGHT: 64 IN | SYSTOLIC BLOOD PRESSURE: 151 MMHG | HEART RATE: 112 BPM | TEMPERATURE: 98 F

## 2020-04-04 DIAGNOSIS — F17.210 NICOTINE DEPENDENCE, CIGARETTES, UNCOMPLICATED: ICD-10-CM

## 2020-04-04 DIAGNOSIS — E66.3 OVERWEIGHT: ICD-10-CM

## 2020-04-04 DIAGNOSIS — Z98.890 OTHER SPECIFIED POSTPROCEDURAL STATES: Chronic | ICD-10-CM

## 2020-04-04 DIAGNOSIS — Z90.49 ACQUIRED ABSENCE OF OTHER SPECIFIED PARTS OF DIGESTIVE TRACT: Chronic | ICD-10-CM

## 2020-04-04 DIAGNOSIS — E78.5 HYPERLIPIDEMIA, UNSPECIFIED: ICD-10-CM

## 2020-04-04 DIAGNOSIS — J45.909 UNSPECIFIED ASTHMA, UNCOMPLICATED: ICD-10-CM

## 2020-04-04 DIAGNOSIS — J96.91 RESPIRATORY FAILURE, UNSPECIFIED WITH HYPOXIA: ICD-10-CM

## 2020-04-04 DIAGNOSIS — I10 ESSENTIAL (PRIMARY) HYPERTENSION: ICD-10-CM

## 2020-04-04 DIAGNOSIS — E11.10 TYPE 2 DIABETES MELLITUS WITH KETOACIDOSIS WITHOUT COMA: ICD-10-CM

## 2020-04-04 DIAGNOSIS — E87.1 HYPO-OSMOLALITY AND HYPONATREMIA: ICD-10-CM

## 2020-04-04 DIAGNOSIS — U07.1 COVID-19: ICD-10-CM

## 2020-04-04 DIAGNOSIS — E11.9 TYPE 2 DIABETES MELLITUS WITHOUT COMPLICATIONS: ICD-10-CM

## 2020-04-04 DIAGNOSIS — R63.8 OTHER SYMPTOMS AND SIGNS CONCERNING FOOD AND FLUID INTAKE: ICD-10-CM

## 2020-04-04 DIAGNOSIS — Z41.9 ENCOUNTER FOR PROCEDURE FOR PURPOSES OTHER THAN REMEDYING HEALTH STATE, UNSPECIFIED: Chronic | ICD-10-CM

## 2020-04-04 DIAGNOSIS — B97.21 SARS-ASSOCIATED CORONAVIRUS AS THE CAUSE OF DISEASES CLASSIFIED ELSEWHERE: ICD-10-CM

## 2020-04-04 LAB
ALBUMIN SERPL ELPH-MCNC: 3.8 G/DL — SIGNIFICANT CHANGE UP (ref 3.3–5)
ALP SERPL-CCNC: 71 U/L — SIGNIFICANT CHANGE UP (ref 40–120)
ALT FLD-CCNC: 26 U/L — SIGNIFICANT CHANGE UP (ref 10–45)
ANION GAP SERPL CALC-SCNC: 20 MMOL/L — HIGH (ref 5–17)
ANION GAP SERPL CALC-SCNC: 21 MMOL/L — HIGH (ref 5–17)
APPEARANCE UR: CLEAR — SIGNIFICANT CHANGE UP
APTT BLD: 31.3 SEC — SIGNIFICANT CHANGE UP (ref 27.5–36.3)
AST SERPL-CCNC: 42 U/L — HIGH (ref 10–40)
B-OH-BUTYR SERPL-SCNC: 4.8 MMOL/L — HIGH
BASE EXCESS BLDV CALC-SCNC: -3.4 MMOL/L — SIGNIFICANT CHANGE UP
BASOPHILS # BLD AUTO: 0.01 K/UL — SIGNIFICANT CHANGE UP (ref 0–0.2)
BASOPHILS NFR BLD AUTO: 0.2 % — SIGNIFICANT CHANGE UP (ref 0–2)
BILIRUB SERPL-MCNC: 0.6 MG/DL — SIGNIFICANT CHANGE UP (ref 0.2–1.2)
BILIRUB UR-MCNC: NEGATIVE — SIGNIFICANT CHANGE UP
BUN SERPL-MCNC: 16 MG/DL — SIGNIFICANT CHANGE UP (ref 7–23)
BUN SERPL-MCNC: 17 MG/DL — SIGNIFICANT CHANGE UP (ref 7–23)
CALCIUM SERPL-MCNC: 9.6 MG/DL — SIGNIFICANT CHANGE UP (ref 8.4–10.5)
CALCIUM SERPL-MCNC: 9.7 MG/DL — SIGNIFICANT CHANGE UP (ref 8.4–10.5)
CHLORIDE SERPL-SCNC: 91 MMOL/L — LOW (ref 96–108)
CHLORIDE SERPL-SCNC: 99 MMOL/L — SIGNIFICANT CHANGE UP (ref 96–108)
CK SERPL-CCNC: 27 U/L — LOW (ref 30–200)
CO2 SERPL-SCNC: 17 MMOL/L — LOW (ref 22–31)
CO2 SERPL-SCNC: 17 MMOL/L — LOW (ref 22–31)
COLOR SPEC: YELLOW — SIGNIFICANT CHANGE UP
CREAT SERPL-MCNC: 0.5 MG/DL — SIGNIFICANT CHANGE UP (ref 0.5–1.3)
CREAT SERPL-MCNC: 0.58 MG/DL — SIGNIFICANT CHANGE UP (ref 0.5–1.3)
CRP SERPL-MCNC: 43.14 MG/DL — HIGH (ref 0–0.4)
D DIMER BLD IA.RAPID-MCNC: 507 NG/ML DDU — HIGH
DIFF PNL FLD: NEGATIVE — SIGNIFICANT CHANGE UP
EOSINOPHIL # BLD AUTO: 0.01 K/UL — SIGNIFICANT CHANGE UP (ref 0–0.5)
EOSINOPHIL NFR BLD AUTO: 0.2 % — SIGNIFICANT CHANGE UP (ref 0–6)
FERRITIN SERPL-MCNC: 1557 NG/ML — HIGH (ref 30–400)
GLUCOSE BLDC GLUCOMTR-MCNC: 144 MG/DL — HIGH (ref 70–99)
GLUCOSE BLDC GLUCOMTR-MCNC: 190 MG/DL — HIGH (ref 70–99)
GLUCOSE SERPL-MCNC: 202 MG/DL — HIGH (ref 70–99)
GLUCOSE SERPL-MCNC: 258 MG/DL — HIGH (ref 70–99)
GLUCOSE UR QL: >=1000
HBA1C BLD-MCNC: 10 % — HIGH (ref 4–5.6)
HCO3 BLDV-SCNC: 20 MMOL/L — SIGNIFICANT CHANGE UP (ref 20–27)
HCT VFR BLD CALC: 40.3 % — SIGNIFICANT CHANGE UP (ref 39–50)
HGB BLD-MCNC: 14.2 G/DL — SIGNIFICANT CHANGE UP (ref 13–17)
IMM GRANULOCYTES NFR BLD AUTO: 0.3 % — SIGNIFICANT CHANGE UP (ref 0–1.5)
INR BLD: 1.03 — SIGNIFICANT CHANGE UP (ref 0.88–1.16)
KETONES UR-MCNC: 15 MG/DL
LACTATE SERPL-SCNC: 2 MMOL/L — SIGNIFICANT CHANGE UP (ref 0.5–2)
LEUKOCYTE ESTERASE UR-ACNC: NEGATIVE — SIGNIFICANT CHANGE UP
LYMPHOCYTES # BLD AUTO: 0.62 K/UL — LOW (ref 1–3.3)
LYMPHOCYTES # BLD AUTO: 10 % — LOW (ref 13–44)
MAGNESIUM SERPL-MCNC: 2.3 MG/DL — SIGNIFICANT CHANGE UP (ref 1.6–2.6)
MCHC RBC-ENTMCNC: 31.6 PG — SIGNIFICANT CHANGE UP (ref 27–34)
MCHC RBC-ENTMCNC: 35.2 GM/DL — SIGNIFICANT CHANGE UP (ref 32–36)
MCV RBC AUTO: 89.8 FL — SIGNIFICANT CHANGE UP (ref 80–100)
MONOCYTES # BLD AUTO: 0.27 K/UL — SIGNIFICANT CHANGE UP (ref 0–0.9)
MONOCYTES NFR BLD AUTO: 4.3 % — SIGNIFICANT CHANGE UP (ref 2–14)
NEUTROPHILS # BLD AUTO: 5.29 K/UL — SIGNIFICANT CHANGE UP (ref 1.8–7.4)
NEUTROPHILS NFR BLD AUTO: 85 % — HIGH (ref 43–77)
NITRITE UR-MCNC: NEGATIVE — SIGNIFICANT CHANGE UP
NRBC # BLD: 0 /100 WBCS — SIGNIFICANT CHANGE UP (ref 0–0)
NT-PROBNP SERPL-SCNC: 42 PG/ML — SIGNIFICANT CHANGE UP (ref 0–300)
PCO2 BLDV: 31 MMHG — LOW (ref 41–51)
PH BLDV: 7.42 — SIGNIFICANT CHANGE UP (ref 7.32–7.43)
PH UR: 6 — SIGNIFICANT CHANGE UP (ref 5–8)
PHOSPHATE SERPL-MCNC: 4.5 MG/DL — SIGNIFICANT CHANGE UP (ref 2.5–4.5)
PLATELET # BLD AUTO: 275 K/UL — SIGNIFICANT CHANGE UP (ref 150–400)
PO2 BLDV: 54 MMHG — SIGNIFICANT CHANGE UP
POTASSIUM SERPL-MCNC: 3.6 MMOL/L — SIGNIFICANT CHANGE UP (ref 3.5–5.3)
POTASSIUM SERPL-MCNC: 4.7 MMOL/L — SIGNIFICANT CHANGE UP (ref 3.5–5.3)
POTASSIUM SERPL-SCNC: 3.6 MMOL/L — SIGNIFICANT CHANGE UP (ref 3.5–5.3)
POTASSIUM SERPL-SCNC: 4.7 MMOL/L — SIGNIFICANT CHANGE UP (ref 3.5–5.3)
PROCALCITONIN SERPL-MCNC: 0.41 NG/ML — HIGH (ref 0.02–0.1)
PROT SERPL-MCNC: 8.1 G/DL — SIGNIFICANT CHANGE UP (ref 6–8.3)
PROT UR-MCNC: NEGATIVE MG/DL — SIGNIFICANT CHANGE UP
PROTHROM AB SERPL-ACNC: 11.7 SEC — SIGNIFICANT CHANGE UP (ref 10–12.9)
RBC # BLD: 4.49 M/UL — SIGNIFICANT CHANGE UP (ref 4.2–5.8)
RBC # FLD: 12.5 % — SIGNIFICANT CHANGE UP (ref 10.3–14.5)
SAO2 % BLDV: 88 % — SIGNIFICANT CHANGE UP
SARS-COV-2 RNA SPEC QL NAA+PROBE: DETECTED
SODIUM SERPL-SCNC: 129 MMOL/L — LOW (ref 135–145)
SODIUM SERPL-SCNC: 136 MMOL/L — SIGNIFICANT CHANGE UP (ref 135–145)
SP GR SPEC: 1.01 — SIGNIFICANT CHANGE UP (ref 1–1.03)
TROPONIN T SERPL-MCNC: <0.01 NG/ML — SIGNIFICANT CHANGE UP (ref 0–0.01)
UROBILINOGEN FLD QL: 0.2 E.U./DL — SIGNIFICANT CHANGE UP
WBC # BLD: 6.22 K/UL — SIGNIFICANT CHANGE UP (ref 3.8–10.5)
WBC # FLD AUTO: 6.22 K/UL — SIGNIFICANT CHANGE UP (ref 3.8–10.5)

## 2020-04-04 PROCEDURE — 93010 ELECTROCARDIOGRAM REPORT: CPT

## 2020-04-04 PROCEDURE — 99223 1ST HOSP IP/OBS HIGH 75: CPT | Mod: GC

## 2020-04-04 PROCEDURE — 71045 X-RAY EXAM CHEST 1 VIEW: CPT | Mod: 26

## 2020-04-04 PROCEDURE — 99291 CRITICAL CARE FIRST HOUR: CPT

## 2020-04-04 RX ORDER — INSULIN LISPRO 100/ML
5 VIAL (ML) SUBCUTANEOUS
Refills: 0 | Status: DISCONTINUED | OUTPATIENT
Start: 2020-04-04 | End: 2020-04-05

## 2020-04-04 RX ORDER — GLUCAGON INJECTION, SOLUTION 0.5 MG/.1ML
1 INJECTION, SOLUTION SUBCUTANEOUS ONCE
Refills: 0 | Status: DISCONTINUED | OUTPATIENT
Start: 2020-04-04 | End: 2020-04-12

## 2020-04-04 RX ORDER — HYDROXYCHLOROQUINE SULFATE 200 MG
400 TABLET ORAL EVERY 12 HOURS
Refills: 0 | Status: COMPLETED | OUTPATIENT
Start: 2020-04-04 | End: 2020-04-05

## 2020-04-04 RX ORDER — CEFTRIAXONE 500 MG/1
1000 INJECTION, POWDER, FOR SOLUTION INTRAMUSCULAR; INTRAVENOUS ONCE
Refills: 0 | Status: COMPLETED | OUTPATIENT
Start: 2020-04-04 | End: 2020-04-04

## 2020-04-04 RX ORDER — SODIUM CHLORIDE 9 MG/ML
1000 INJECTION, SOLUTION INTRAVENOUS
Refills: 0 | Status: DISCONTINUED | OUTPATIENT
Start: 2020-04-04 | End: 2020-04-12

## 2020-04-04 RX ORDER — DEXTROSE 50 % IN WATER 50 %
12.5 SYRINGE (ML) INTRAVENOUS ONCE
Refills: 0 | Status: DISCONTINUED | OUTPATIENT
Start: 2020-04-04 | End: 2020-04-12

## 2020-04-04 RX ORDER — AZITHROMYCIN 500 MG/1
250 TABLET, FILM COATED ORAL EVERY 24 HOURS
Refills: 0 | Status: COMPLETED | OUTPATIENT
Start: 2020-04-05 | End: 2020-04-08

## 2020-04-04 RX ORDER — TOCILIZUMAB 20 MG/ML
400 INJECTION, SOLUTION, CONCENTRATE INTRAVENOUS ONCE
Refills: 0 | Status: COMPLETED | OUTPATIENT
Start: 2020-04-04 | End: 2020-04-04

## 2020-04-04 RX ORDER — DEXTROSE 50 % IN WATER 50 %
15 SYRINGE (ML) INTRAVENOUS ONCE
Refills: 0 | Status: DISCONTINUED | OUTPATIENT
Start: 2020-04-04 | End: 2020-04-05

## 2020-04-04 RX ORDER — DEXTROSE 50 % IN WATER 50 %
25 SYRINGE (ML) INTRAVENOUS ONCE
Refills: 0 | Status: DISCONTINUED | OUTPATIENT
Start: 2020-04-04 | End: 2020-04-05

## 2020-04-04 RX ORDER — POTASSIUM CHLORIDE 20 MEQ
40 PACKET (EA) ORAL ONCE
Refills: 0 | Status: COMPLETED | OUTPATIENT
Start: 2020-04-04 | End: 2020-04-04

## 2020-04-04 RX ORDER — INSULIN LISPRO 100/ML
VIAL (ML) SUBCUTANEOUS
Refills: 0 | Status: DISCONTINUED | OUTPATIENT
Start: 2020-04-04 | End: 2020-04-05

## 2020-04-04 RX ORDER — ACETAMINOPHEN 500 MG
325 TABLET ORAL EVERY 4 HOURS
Refills: 0 | Status: DISCONTINUED | OUTPATIENT
Start: 2020-04-04 | End: 2020-04-12

## 2020-04-04 RX ORDER — SODIUM CHLORIDE 9 MG/ML
500 INJECTION INTRAMUSCULAR; INTRAVENOUS; SUBCUTANEOUS ONCE
Refills: 0 | Status: COMPLETED | OUTPATIENT
Start: 2020-04-04 | End: 2020-04-04

## 2020-04-04 RX ORDER — INSULIN GLARGINE 100 [IU]/ML
15 INJECTION, SOLUTION SUBCUTANEOUS AT BEDTIME
Refills: 0 | Status: DISCONTINUED | OUTPATIENT
Start: 2020-04-04 | End: 2020-04-05

## 2020-04-04 RX ORDER — AZITHROMYCIN 500 MG/1
500 TABLET, FILM COATED ORAL ONCE
Refills: 0 | Status: COMPLETED | OUTPATIENT
Start: 2020-04-04 | End: 2020-04-04

## 2020-04-04 RX ORDER — ALBUTEROL 90 UG/1
2 AEROSOL, METERED ORAL EVERY 6 HOURS
Refills: 0 | Status: DISCONTINUED | OUTPATIENT
Start: 2020-04-04 | End: 2020-04-12

## 2020-04-04 RX ORDER — ACETAMINOPHEN 500 MG
650 TABLET ORAL EVERY 6 HOURS
Refills: 0 | Status: DISCONTINUED | OUTPATIENT
Start: 2020-04-04 | End: 2020-04-10

## 2020-04-04 RX ORDER — HYDROXYCHLOROQUINE SULFATE 200 MG
TABLET ORAL
Refills: 0 | Status: DISCONTINUED | OUTPATIENT
Start: 2020-04-04 | End: 2020-04-04

## 2020-04-04 RX ORDER — HYDROXYCHLOROQUINE SULFATE 200 MG
200 TABLET ORAL EVERY 12 HOURS
Refills: 0 | Status: COMPLETED | OUTPATIENT
Start: 2020-04-05 | End: 2020-04-08

## 2020-04-04 RX ORDER — LISINOPRIL 2.5 MG/1
10 TABLET ORAL DAILY
Refills: 0 | Status: DISCONTINUED | OUTPATIENT
Start: 2020-04-04 | End: 2020-04-12

## 2020-04-04 RX ORDER — ENOXAPARIN SODIUM 100 MG/ML
40 INJECTION SUBCUTANEOUS EVERY 24 HOURS
Refills: 0 | Status: DISCONTINUED | OUTPATIENT
Start: 2020-04-04 | End: 2020-04-07

## 2020-04-04 RX ORDER — FAMOTIDINE 10 MG/ML
20 INJECTION INTRAVENOUS DAILY
Refills: 0 | Status: DISCONTINUED | OUTPATIENT
Start: 2020-04-04 | End: 2020-04-12

## 2020-04-04 RX ADMIN — Medication 40 MILLIEQUIVALENT(S): at 16:41

## 2020-04-04 RX ADMIN — Medication 5 UNIT(S): at 16:59

## 2020-04-04 RX ADMIN — AZITHROMYCIN 500 MILLIGRAM(S): 500 TABLET, FILM COATED ORAL at 10:52

## 2020-04-04 RX ADMIN — Medication 650 MILLIGRAM(S): at 16:13

## 2020-04-04 RX ADMIN — AZITHROMYCIN 250 MILLIGRAM(S): 500 TABLET, FILM COATED ORAL at 23:40

## 2020-04-04 RX ADMIN — ENOXAPARIN SODIUM 40 MILLIGRAM(S): 100 INJECTION SUBCUTANEOUS at 16:14

## 2020-04-04 RX ADMIN — Medication 400 MILLIGRAM(S): at 16:16

## 2020-04-04 RX ADMIN — INSULIN GLARGINE 15 UNIT(S): 100 INJECTION, SOLUTION SUBCUTANEOUS at 22:11

## 2020-04-04 RX ADMIN — Medication 2: at 22:12

## 2020-04-04 RX ADMIN — CEFTRIAXONE 1000 MILLIGRAM(S): 500 INJECTION, POWDER, FOR SOLUTION INTRAMUSCULAR; INTRAVENOUS at 12:00

## 2020-04-04 RX ADMIN — Medication 650 MILLIGRAM(S): at 23:41

## 2020-04-04 RX ADMIN — LISINOPRIL 10 MILLIGRAM(S): 2.5 TABLET ORAL at 16:43

## 2020-04-04 RX ADMIN — SODIUM CHLORIDE 500 MILLILITER(S): 9 INJECTION INTRAMUSCULAR; INTRAVENOUS; SUBCUTANEOUS at 12:00

## 2020-04-04 RX ADMIN — SODIUM CHLORIDE 1000 MILLILITER(S): 9 INJECTION INTRAMUSCULAR; INTRAVENOUS; SUBCUTANEOUS at 12:12

## 2020-04-04 RX ADMIN — FAMOTIDINE 20 MILLIGRAM(S): 10 INJECTION INTRAVENOUS at 16:42

## 2020-04-04 RX ADMIN — SODIUM CHLORIDE 500 MILLILITER(S): 9 INJECTION INTRAMUSCULAR; INTRAVENOUS; SUBCUTANEOUS at 10:52

## 2020-04-04 RX ADMIN — Medication 40 MILLIGRAM(S): at 16:43

## 2020-04-04 RX ADMIN — TOCILIZUMAB 100 MILLIGRAM(S): 20 INJECTION, SOLUTION, CONCENTRATE INTRAVENOUS at 16:18

## 2020-04-04 RX ADMIN — CEFTRIAXONE 100 MILLIGRAM(S): 500 INJECTION, POWDER, FOR SOLUTION INTRAMUSCULAR; INTRAVENOUS at 10:52

## 2020-04-04 NOTE — ED PROVIDER NOTE - OBJECTIVE STATEMENT
60M with a h/o asthma, hypertension, and DM on oral meds who p/w flu-like sx x 1 week with worsening SOb, cough, and malaise/fatigue. No n/v/d, he is tolerating PO. His daughter has +covid at home.

## 2020-04-04 NOTE — H&P ADULT - ASSESSMENT
Patient is a 59yo M with pmh of type 2 dm, htn, and mild seasonal asthma (never hospitalized for it, occasionally prescribed inhalers) presenting with one week of experiencing fevers/chills, dry cough, and generalized weakness, with shortness of breath over the last several days, found to be hypoxic to 82% on RA, now stable on 6L NC, found to be COVID positive.

## 2020-04-04 NOTE — H&P ADULT - PROBLEM SELECTOR PLAN 6
F: S/p 1L NS  E: Replete for K<4 and Mag <2  N: Diet Carb Consistnet  A: Lovenox   Dispo: RMF  Code: FULL CODE Pt with no reported history of hyperlipidemia or hypercholesterolemia but is prescribed fenofibrate 130mg to be taken daily. Given lack of insurance, has not been taking this medication for prolonged time  -Low utility in restarting medication if pt has not been on it, will hold off for now  -F/u lipid panel Pt with no reported history of hyperlipidemia or hypercholesterolemia but is prescribed fenofibrate 130mg to be taken daily. Given lack of insurance, has not been taking this medication for prolonged time  -Low utility in restarting medication if pt has not been on it, will hold off for now

## 2020-04-04 NOTE — H&P ADULT - NSHPSOCIALHISTORY_GEN_ALL_CORE
Patient smokes 1-2 cigarettes per year. Drinks 2 glasses of wine or 1-2 beers daily. No vaping or recreational drug use. Lives with wife.

## 2020-04-04 NOTE — H&P ADULT - HISTORY OF PRESENT ILLNESS
Patient is a 61yo M with pmh of type 2 dm, htn, and mild seasonal asthma (never hospitalized for it, occasionally prescribed inhalers) presenting with one week of experiencing fevers/chills, dry cough, and generalized weakness, with shortness of breath over the last several days. Patient states that he has felt febrile but has always been around 98F when he has taken his temperature. He has also had significantly decreased po intake and has been having 1-2 episodes of watery diarrhea over the last 3-4 days. Patient denies chest pain, abdominal pain, nausea/vomiting,    On arrival to ED, T 98.2, , /88, RR 18 satting at 82% on room air (placed on 15L NRB with sats increasing to 95%.) Labs remarkable for lymphopenia, d-dimer 507, Na 129, chloride 91, carbon dioxide 17, anion gap 21, glucose 258, crp 43.14, ferritin 1557, vbg pH 7.42 with pCO2 31. UA with ketones and glucosuria. Patient is a 59yo M with pmh of type 2 dm, htn, and mild seasonal asthma (never hospitalized for it, occasionally prescribed inhalers) presenting with one week of experiencing fevers/chills, dry cough, and generalized weakness, with shortness of breath over the last several days. Patient states that he has felt febrile but has always been around 98F when he has taken his temperature. He has also had significantly decreased po intake and has been having 1-2 episodes of watery diarrhea over the last 3-4 days. Patient denies chest pain, abdominal pain, nausea/vomiting,    On arrival to ED, T 98.2, , /88, RR 18 satting at 82% on room air (placed on 15L NRB with sats increasing to 95%.) Labs remarkable for lymphopenia, d-dimer 507, Na 129, chloride 91, carbon dioxide 17, anion gap 21, glucose 258, crp 43.14, ferritin 1557, vbg pH 7.42 with pCO2 31. UA with ketones and glucosuria. EKG sinus tach with no ischemic changes, qtc 428. CXR with patchy lung infiltrates. COVID pcr positive. Pt given cef, azithro, and 1L NS.     HPI:    Date of onset of fever: 3/28  Date of onset of dyspnea: 4/1  Recent Travel: None  Sick Contacts: Daughter  COVID Exposure: Daughter (known positive)  Close Contacts: Wife, daughter    ROS:  Fevers: Subjective fevers  Malaise: Y  Myalgias: N  SOB: Y       At rest: Y         With exertion: Y       At baseline: Y  Cough: Y       Productive: N  Nausea: N  Vomiting: N  Diarrhea: Y    PMHx:   HTN: Y  DM: Y  Lung Disease: Y       Specify: Seasonal asthma  Cardiovascular disease: N  Malignancy: N  Immunosuppression: N  HIV: N  CKD/ESRD: N  Chronic Liver Disease: N    SoHx:  Tobacco use: Y  Vape use: N  Health care worker: N

## 2020-04-04 NOTE — H&P ADULT - PROBLEM SELECTOR PLAN 4
Pt with known history of htn. Should be on lisinopril and propranolol at home as prescribed but has not been taking given lack of insurance  -Continue with prescribed propranolol 20mg TID and lisinopril 10mg daily

## 2020-04-04 NOTE — ED PROVIDER NOTE - PHYSICAL EXAMINATION
GEN: Well developed, well nourished, awake, alert, oriented to person, place, time/situation and in no apparent distress. ill-appearing.   ENT: Airway patent, Nasal mucosa clear. Mouth with normal mucosa.  EYES: Clear bilaterally. PERRL, EOMI  RESPIRATORY: Diffuse bilateral crackles, tachypnea, satting 82% on RA, improved to 96% on NRB.   CARDIAC: Regular rate and rhythm, no M/R/G  ABDOMEN: Soft, nontender, +bowel sounds, no rebound, rigidity, or guarding.  MSK: Range of motion is not limited, no deformities noted.  NEURO: Alert and oriented, no focal deficits.  SKIN: Skin normal color for race, warm, dry and intact. No evidence of rash.  PSYCH: Alert and oriented to person, place, time/situation. normal mood and affect. no apparent risk to self or others.

## 2020-04-04 NOTE — H&P ADULT - NSHPPHYSICALEXAM_GEN_ALL_CORE
.  VITAL SIGNS:  T(C): 37.3 (04-04-20 @ 12:31), Max: 37.3 (04-04-20 @ 12:19)  T(F): 99.1 (04-04-20 @ 12:31), Max: 99.2 (04-04-20 @ 12:19)  HR: 101 (04-04-20 @ 12:31) (100 - 112)  BP: 133/80 (04-04-20 @ 12:31) (108/64 - 151/88)  BP(mean): --  RR: 22 (04-04-20 @ 12:31) (17 - 22)  SpO2: 93% (04-04-20 @ 12:31) (82% - 95%)  Wt(kg): --    PHYSICAL EXAM:    Constitutional: WDWN resting comfortably in bed; NAD, appears comfortable on 6L NC  Head: NC/AT  Eyes: PERRL, EOMI, anicteric sclera  ENT: no nasal discharge; uvula midline, no oropharyngeal erythema or exudates; dry MM  Neck: supple; no JVD or thyromegaly  Respiratory: Diffuse crackles appreciated bialterally, mildly tachypneic on 6L NC when speaking, appears comfortable at rest. No accessory muscle use.  Cardiac: +S1/S2; RRR; no M/R/G; PMI non-displaced  Gastrointestinal: abdomen soft, NT/ND; no rebound or guarding; +BSx4  Back: spine midline, no bony tenderness or step-offs; no CVAT B/L  Extremities: WWP, no clubbing or cyanosis; no peripheral edema  Musculoskeletal: NROM x4; no joint swelling, tenderness or erythema  Vascular: 2+ radial, femoral, DP/PT pulses B/L  Dermatologic: skin warm, dry and intact; no rashes, wounds, or scars  Lymphatic: no submandibular or cervical LAD  Neurologic: AAOx3; CNII-XII grossly intact; no focal deficits  Psychiatric: affect and characteristics of appearance, verbalizations, behaviors are appropriate

## 2020-04-04 NOTE — H&P ADULT - PROBLEM SELECTOR PLAN 7
F: S/p 1L NS  E: Replete for K<4 and Mag <2  N: Diet Carb Consistnet  A: Lovenox   Dispo: RMF  Code: FULL CODE

## 2020-04-04 NOTE — H&P ADULT - PROBLEM SELECTOR PLAN 3
Patient with known history of type 2 diabetes. On metformin, jardiance, and januvia at home  -Will start on basal-bolus insulin regimen (15u lantus at bedtime, 5u lispro TID with meals) and moderate ISS  -F/u HgA1c

## 2020-04-04 NOTE — ED PROVIDER NOTE - CLINICAL SUMMARY MEDICAL DECISION MAKING FREE TEXT BOX
60M with above PMHx who p/w flu-like sx x 1 week and worsening, suspect covid infection. Pt hypoxic to 82% on RA on arrival, improved with NRB. Pt does not require emergent intubation at this time. Isolation precautions ordered. Labs and cultures sent, including Covid and RVP. CXR shows infiltrates c/w possible Covid infection.  Patient was started on azithromycin, ceftriaxone, and small bolus of fluids given. Pt will require admission. Stable for RMF at this time. 60M with above PMHx who p/w flu-like sx x 1 week and worsening, suspect covid infection. Pt hypoxic to 82% on RA on arrival, improved with NRB. Pt does not require emergent intubation at this time. Isolation precautions ordered. Labs and cultures sent, including Covid and RVP. CXR shows infiltrates c/w possible Covid infection.  Patient was started on azithromycin, ceftriaxone, and small bolus of fluids given. Pt will require admission.   11:45am - Pt is currently satting 93% on 6L NC, appears stable for regional medicine at this time. 60M with above PMHx who p/w flu-like sx x 1 week and worsening, suspect covid infection. Pt hypoxic to 82% on RA on arrival, improved with NRB. Pt does not require emergent intubation at this time. Isolation precautions ordered. Labs and cultures sent, including Covid and RVP. CXR shows infiltrates c/w possible Covid infection.  Patient was started on azithromycin, ceftriaxone, and small bolus of fluids given. Pt found to be hyperglycemic w/o DKA, will order addition 500cc IVFs. Pt will require admission.   11:45am - Pt is currently satting 93% on 6L NC, appears stable for regional medicine at this time.

## 2020-04-04 NOTE — H&P ADULT - PROBLEM SELECTOR PLAN 5
Pt with history of seasonal asthma, only requiring occasional albuterol inhaler use. Never intubated or hospitalized for asthma  -Prn albuterol inhaler

## 2020-04-04 NOTE — ED ADULT NURSE NOTE - OBJECTIVE STATEMENT
Patient presents to the ED complaining of shortness of breath for the past week. Patient noted to be tachypnic, with labored breathing. Room 02 saturation of 82%. Placed on NRB with improvement to 94%. History of HTN, DM and asthma.

## 2020-04-04 NOTE — H&P ADULT - PROBLEM SELECTOR PLAN 1
Patient with subjective fevers/chills, dry cough, and generalized weakness, with shortness of breath over the last several days, found to be hypoxic to 82% on RA on presentation to Ed. Found to be COVID positive. CXR with bilateral patchy infiltrates. Pt currently satting 93% on 6L NC, though drops to approx 91% when talking. Significantly elevated CRP at 43.14 and ferritin at 1557.  - Isolation precautions; contact and airborne  - f/u COVID-19 remaining admission labs  - trend COVID-19 labs  - Monitor O2 saturation, monitor for respiratory distress, currently at 92-93% on 6L NC  - O2 via NC --> ventimask --> NRB as necessary  - albuterol MDI; avoid duonebs  - c/w hydroxychloroquine 400mg IV BID x2 doses, then 200mg BID for 4 days  - c/w azithromycin 250mg QD  - tylenol standing 675mg PO given pt's inflammatory markers, with 325mg q6 hours prn for fevers despite standing Tylenol. Can d/c standing Tylenol if pt continues to be afebrile   FOR DECOMPENSATION  - consider tocilizumab  - consider steroids 40 IV BID Patient with subjective fevers/chills, dry cough, and generalized weakness, with shortness of breath over the last several days, found to be hypoxic to 82% on RA on presentation to Ed. Found to be COVID positive. CXR with bilateral patchy infiltrates. Pt currently satting 93% on 6L NC, though drops to approx 91% when talking. Significantly elevated CRP at 43.14 and ferritin at 1557.  - Isolation precautions; contact and airborne  - f/u COVID-19 remaining admission labs  - trend COVID-19 labs  - Monitor O2 saturation, monitor for respiratory distress, currently at 92-93% on 6L NC  - O2 via NC --> ventimask --> NRB as necessary  - albuterol MDI; avoid duonebs  - c/w hydroxychloroquine 400mg IV BID x2 doses, then 200mg BID for 4 days  - c/w azithromycin 250mg QD  - tylenol standing 675mg PO given pt's inflammatory markers, with 325mg q6 hours prn for fevers despite standing Tylenol. Can d/c standing Tylenol if pt continues to be afebrile   FOR DECOMPENSATION  - consider tocilizumab  - consider steroids 40 IV BID    # ADDENDUM  - Patient seems to be in storm, will start solumedrol 40mg IV bid and tocilizumab [id number]

## 2020-04-04 NOTE — H&P ADULT - NSHPLABSRESULTS_GEN_ALL_CORE
.  LABS:                         14.2   6.22  )-----------( 275      ( 2020 10:24 )             40.3     -04    129<L>  |  91<L>  |  16  ----------------------------<  258<H>  3.6   |  17<L>  |  0.58    Ca    9.6      2020 10:24    TPro  8.1  /  Alb  3.8  /  TBili  0.6  /  DBili  x   /  AST  42<H>  /  ALT  26  /  AlkPhos  71  04-04    PT/INR - ( 2020 10:24 )   PT: 11.7 sec;   INR: 1.03          PTT - ( 2020 10:24 )  PTT:31.3 sec  Urinalysis Basic - ( 2020 11:14 )    Color: Yellow / Appearance: Clear / S.010 / pH: x  Gluc: x / Ketone: 15 mg/dL  / Bili: Negative / Urobili: 0.2 E.U./dL   Blood: x / Protein: NEGATIVE mg/dL / Nitrite: NEGATIVE   Leuk Esterase: NEGATIVE / RBC: x / WBC x   Sq Epi: x / Non Sq Epi: x / Bacteria: x      CARDIAC MARKERS ( 2020 10:24 )  x     / <0.01 ng/mL / 27 U/L / x     / x            Lactate, Blood: 2.0 mmol/L ( @ 10:22)      RADIOLOGY, EKG & ADDITIONAL TESTS: Reviewed.

## 2020-04-04 NOTE — H&P ADULT - PROBLEM SELECTOR PLAN 2
Patient with Na 129, appears very dry on exam and endorses poor po intake. S/p 1L NS in ED  -F/u repeat BMP this afternoon  -Encourage po intake   -Cautious with aggressive fluid repletion in setting of COVID  -If Na not improving s/p fluids, will send urine lytes     #Increased anion gap  Patient with AG of 21 with ketones in urine. Lactate 2.0. No history of renal failure. Not in DKA given mild alkalosis on vbg  -Suspect from starvation ketosis   -F/u betahydroxybutyrate  -Monitor bmp

## 2020-04-04 NOTE — ED PROVIDER NOTE - CARE PLAN
Principal Discharge DX:	Suspected 2019 novel coronavirus infection Principal Discharge DX:	Suspected 2019 novel coronavirus infection  Secondary Diagnosis:	Hyperglycemia

## 2020-04-04 NOTE — ED ADULT NURSE REASSESSMENT NOTE - NS ED NURSE REASSESS COMMENT FT1
Covering RN note:  Pt resting in bed.  Pt tolerating O2 via nc at 6 L/min at this time.  O2 sat=93% with O2 in place.  Pt denies cp, ha, or dizziness.  Pt c/o sob on minimal exertion at this time.  Speaking in complete sentences at this time.  Will continue to monitor.

## 2020-04-05 LAB
ALBUMIN SERPL ELPH-MCNC: 3.5 G/DL — SIGNIFICANT CHANGE UP (ref 3.3–5)
ALP SERPL-CCNC: 71 U/L — SIGNIFICANT CHANGE UP (ref 40–120)
ALT FLD-CCNC: 26 U/L — SIGNIFICANT CHANGE UP (ref 10–45)
ANION GAP SERPL CALC-SCNC: 22 MMOL/L — HIGH (ref 5–17)
ANION GAP SERPL CALC-SCNC: 25 MMOL/L — HIGH (ref 5–17)
ANION GAP SERPL CALC-SCNC: 27 MMOL/L — HIGH (ref 5–17)
AST SERPL-CCNC: 37 U/L — SIGNIFICANT CHANGE UP (ref 10–40)
B-OH-BUTYR SERPL-SCNC: 7.6 MMOL/L — HIGH
BASOPHILS # BLD AUTO: 0.01 K/UL — SIGNIFICANT CHANGE UP (ref 0–0.2)
BASOPHILS NFR BLD AUTO: 0.2 % — SIGNIFICANT CHANGE UP (ref 0–2)
BILIRUB SERPL-MCNC: 0.3 MG/DL — SIGNIFICANT CHANGE UP (ref 0.2–1.2)
BUN SERPL-MCNC: 30 MG/DL — HIGH (ref 7–23)
BUN SERPL-MCNC: 32 MG/DL — HIGH (ref 7–23)
BUN SERPL-MCNC: 32 MG/DL — HIGH (ref 7–23)
CALCIUM SERPL-MCNC: 9.4 MG/DL — SIGNIFICANT CHANGE UP (ref 8.4–10.5)
CALCIUM SERPL-MCNC: 9.5 MG/DL — SIGNIFICANT CHANGE UP (ref 8.4–10.5)
CALCIUM SERPL-MCNC: 9.6 MG/DL — SIGNIFICANT CHANGE UP (ref 8.4–10.5)
CHLORIDE SERPL-SCNC: 101 MMOL/L — SIGNIFICANT CHANGE UP (ref 96–108)
CHLORIDE SERPL-SCNC: 97 MMOL/L — SIGNIFICANT CHANGE UP (ref 96–108)
CHLORIDE SERPL-SCNC: 99 MMOL/L — SIGNIFICANT CHANGE UP (ref 96–108)
CO2 SERPL-SCNC: 13 MMOL/L — LOW (ref 22–31)
CO2 SERPL-SCNC: 14 MMOL/L — LOW (ref 22–31)
CO2 SERPL-SCNC: 14 MMOL/L — LOW (ref 22–31)
CREAT SERPL-MCNC: 0.61 MG/DL — SIGNIFICANT CHANGE UP (ref 0.5–1.3)
CREAT SERPL-MCNC: 0.61 MG/DL — SIGNIFICANT CHANGE UP (ref 0.5–1.3)
CREAT SERPL-MCNC: 0.62 MG/DL — SIGNIFICANT CHANGE UP (ref 0.5–1.3)
CRP SERPL-MCNC: 35.95 MG/DL — HIGH (ref 0–0.4)
D DIMER BLD IA.RAPID-MCNC: 522 NG/ML DDU — HIGH
EOSINOPHIL # BLD AUTO: 0 K/UL — SIGNIFICANT CHANGE UP (ref 0–0.5)
EOSINOPHIL NFR BLD AUTO: 0 % — SIGNIFICANT CHANGE UP (ref 0–6)
FERRITIN SERPL-MCNC: 1559 NG/ML — HIGH (ref 30–400)
GAS PNL BLDV: SIGNIFICANT CHANGE UP
GLUCOSE BLDC GLUCOMTR-MCNC: 120 MG/DL — HIGH (ref 70–99)
GLUCOSE BLDC GLUCOMTR-MCNC: 189 MG/DL — HIGH (ref 70–99)
GLUCOSE BLDC GLUCOMTR-MCNC: 214 MG/DL — HIGH (ref 70–99)
GLUCOSE BLDC GLUCOMTR-MCNC: 221 MG/DL — HIGH (ref 70–99)
GLUCOSE BLDC GLUCOMTR-MCNC: 241 MG/DL — HIGH (ref 70–99)
GLUCOSE BLDC GLUCOMTR-MCNC: 304 MG/DL — HIGH (ref 70–99)
GLUCOSE SERPL-MCNC: 301 MG/DL — HIGH (ref 70–99)
GLUCOSE SERPL-MCNC: 304 MG/DL — HIGH (ref 70–99)
GLUCOSE SERPL-MCNC: 333 MG/DL — HIGH (ref 70–99)
HCT VFR BLD CALC: 40.1 % — SIGNIFICANT CHANGE UP (ref 39–50)
HCV AB S/CO SERPL IA: 0.07 S/CO — SIGNIFICANT CHANGE UP
HCV AB SERPL-IMP: SIGNIFICANT CHANGE UP
HGB BLD-MCNC: 13.6 G/DL — SIGNIFICANT CHANGE UP (ref 13–17)
IMM GRANULOCYTES NFR BLD AUTO: 0.6 % — SIGNIFICANT CHANGE UP (ref 0–1.5)
LYMPHOCYTES # BLD AUTO: 0.76 K/UL — LOW (ref 1–3.3)
LYMPHOCYTES # BLD AUTO: 14.3 % — SIGNIFICANT CHANGE UP (ref 13–44)
MAGNESIUM SERPL-MCNC: 2.8 MG/DL — HIGH (ref 1.6–2.6)
MCHC RBC-ENTMCNC: 31.1 PG — SIGNIFICANT CHANGE UP (ref 27–34)
MCHC RBC-ENTMCNC: 33.9 GM/DL — SIGNIFICANT CHANGE UP (ref 32–36)
MCV RBC AUTO: 91.8 FL — SIGNIFICANT CHANGE UP (ref 80–100)
MONOCYTES # BLD AUTO: 0.14 K/UL — SIGNIFICANT CHANGE UP (ref 0–0.9)
MONOCYTES NFR BLD AUTO: 2.6 % — SIGNIFICANT CHANGE UP (ref 2–14)
NEUTROPHILS # BLD AUTO: 4.36 K/UL — SIGNIFICANT CHANGE UP (ref 1.8–7.4)
NEUTROPHILS NFR BLD AUTO: 82.3 % — HIGH (ref 43–77)
NRBC # BLD: 0 /100 WBCS — SIGNIFICANT CHANGE UP (ref 0–0)
PHOSPHATE SERPL-MCNC: 5.9 MG/DL — HIGH (ref 2.5–4.5)
PLATELET # BLD AUTO: 338 K/UL — SIGNIFICANT CHANGE UP (ref 150–400)
POTASSIUM SERPL-MCNC: 4.6 MMOL/L — SIGNIFICANT CHANGE UP (ref 3.5–5.3)
POTASSIUM SERPL-MCNC: 4.6 MMOL/L — SIGNIFICANT CHANGE UP (ref 3.5–5.3)
POTASSIUM SERPL-MCNC: 4.8 MMOL/L — SIGNIFICANT CHANGE UP (ref 3.5–5.3)
POTASSIUM SERPL-SCNC: 4.6 MMOL/L — SIGNIFICANT CHANGE UP (ref 3.5–5.3)
POTASSIUM SERPL-SCNC: 4.6 MMOL/L — SIGNIFICANT CHANGE UP (ref 3.5–5.3)
POTASSIUM SERPL-SCNC: 4.8 MMOL/L — SIGNIFICANT CHANGE UP (ref 3.5–5.3)
PROT SERPL-MCNC: 7.5 G/DL — SIGNIFICANT CHANGE UP (ref 6–8.3)
RBC # BLD: 4.37 M/UL — SIGNIFICANT CHANGE UP (ref 4.2–5.8)
RBC # FLD: 13.1 % — SIGNIFICANT CHANGE UP (ref 10.3–14.5)
SODIUM SERPL-SCNC: 135 MMOL/L — SIGNIFICANT CHANGE UP (ref 135–145)
SODIUM SERPL-SCNC: 137 MMOL/L — SIGNIFICANT CHANGE UP (ref 135–145)
SODIUM SERPL-SCNC: 140 MMOL/L — SIGNIFICANT CHANGE UP (ref 135–145)
WBC # BLD: 5.3 K/UL — SIGNIFICANT CHANGE UP (ref 3.8–10.5)
WBC # FLD AUTO: 5.3 K/UL — SIGNIFICANT CHANGE UP (ref 3.8–10.5)

## 2020-04-05 PROCEDURE — 99233 SBSQ HOSP IP/OBS HIGH 50: CPT | Mod: GC

## 2020-04-05 PROCEDURE — 93010 ELECTROCARDIOGRAM REPORT: CPT

## 2020-04-05 RX ORDER — INSULIN LISPRO 100/ML
14 VIAL (ML) SUBCUTANEOUS
Refills: 0 | Status: DISCONTINUED | OUTPATIENT
Start: 2020-04-05 | End: 2020-04-05

## 2020-04-05 RX ORDER — DEXTROSE 50 % IN WATER 50 %
15 SYRINGE (ML) INTRAVENOUS ONCE
Refills: 0 | Status: DISCONTINUED | OUTPATIENT
Start: 2020-04-05 | End: 2020-04-12

## 2020-04-05 RX ORDER — DEXTROSE 10 % IN WATER 10 %
1000 INTRAVENOUS SOLUTION INTRAVENOUS
Refills: 0 | Status: DISCONTINUED | OUTPATIENT
Start: 2020-04-05 | End: 2020-04-06

## 2020-04-05 RX ORDER — INSULIN LISPRO 100/ML
7 VIAL (ML) SUBCUTANEOUS
Refills: 0 | Status: DISCONTINUED | OUTPATIENT
Start: 2020-04-05 | End: 2020-04-06

## 2020-04-05 RX ADMIN — ENOXAPARIN SODIUM 40 MILLIGRAM(S): 100 INJECTION SUBCUTANEOUS at 12:32

## 2020-04-05 RX ADMIN — Medication 2: at 09:48

## 2020-04-05 RX ADMIN — Medication 4: at 12:39

## 2020-04-05 RX ADMIN — Medication 7 UNIT(S): at 21:47

## 2020-04-05 RX ADMIN — LISINOPRIL 10 MILLIGRAM(S): 2.5 TABLET ORAL at 05:14

## 2020-04-05 RX ADMIN — Medication 400 MILLIGRAM(S): at 05:13

## 2020-04-05 RX ADMIN — Medication 5 UNIT(S): at 09:48

## 2020-04-05 RX ADMIN — AZITHROMYCIN 250 MILLIGRAM(S): 500 TABLET, FILM COATED ORAL at 21:48

## 2020-04-05 RX ADMIN — Medication 75 MILLILITER(S): at 22:07

## 2020-04-05 RX ADMIN — Medication 650 MILLIGRAM(S): at 12:32

## 2020-04-05 RX ADMIN — Medication 200 MILLIGRAM(S): at 18:10

## 2020-04-05 RX ADMIN — Medication 650 MILLIGRAM(S): at 05:12

## 2020-04-05 RX ADMIN — Medication 7 UNIT(S): at 23:20

## 2020-04-05 RX ADMIN — Medication 650 MILLIGRAM(S): at 21:49

## 2020-04-05 RX ADMIN — Medication 650 MILLIGRAM(S): at 18:10

## 2020-04-05 RX ADMIN — Medication 200 MILLIGRAM(S): at 05:13

## 2020-04-05 RX ADMIN — Medication 5 UNIT(S): at 18:11

## 2020-04-05 RX ADMIN — Medication 40 MILLIGRAM(S): at 05:14

## 2020-04-05 RX ADMIN — FAMOTIDINE 20 MILLIGRAM(S): 10 INJECTION INTRAVENOUS at 12:32

## 2020-04-05 RX ADMIN — Medication 8: at 18:11

## 2020-04-05 RX ADMIN — Medication 40 MILLIGRAM(S): at 18:10

## 2020-04-05 RX ADMIN — Medication 5 UNIT(S): at 12:39

## 2020-04-05 NOTE — CONSULT NOTE ADULT - ATTENDING COMMENTS
Agree with above.  Pt with hx of type 2 DM, admitted for management of COVID, now with DKA.    Insulin administration this admission reviewed.   Pt started on steroids which has now increased insulin requirements.   pt on SGLT-2 inhibitor as part of home regimen which can increase risk (in theory) of DKA.    Will start subQ insulin DKA protocol: 0.2U/kg every 2 hours until gluocse < 250, then 0.1U/kg every 2 hours and start D10 or D5 depending on fluid/respiratory status to main glucose of ~ 200 until DKA resolves.  Note that this regimen may take longer than insulin infusion protocol if fluids are not feasible.  check BMP, Mg, Phos every 4 hours.  Repeat phos for <3.0 with 45 mEq phos, maintain K at 4.5 if no concern for KATELYN/CKD.    check LDL, statin if > 70  will follow

## 2020-04-05 NOTE — PROGRESS NOTE ADULT - PROBLEM SELECTOR PLAN 6
Pt with no reported history of hyperlipidemia or hypercholesterolemia but is prescribed fenofibrate 130mg to be taken daily. Given lack of insurance, has not been taking this medication for prolonged time  -Low utility in restarting medication if pt has not been on it, will hold off for now

## 2020-04-05 NOTE — PROGRESS NOTE ADULT - PROBLEM SELECTOR PLAN 2
Patient with Na 129, appears very dry on exam and endorses poor po intake. S/p 1L NS in ED  -F/u repeat BMP this afternoon  -Encourage po intake   -Cautious with aggressive fluid repletion in setting of COVID  -If Na not improving s/p fluids, will send urine lytes     #Increased anion gap  Patient with AG of 21 with ketones in urine. Lactate 2.0. No history of renal failure. Not in DKA given mild alkalosis on vbg  -Suspect from starvation ketosis   -F/u betahydroxybutyrate  -Monitor bmp #Increased anion gap  Patient with AG of 27 with ketones in urine. Lactate 2.0. No history of renal failure. Not in DKA given mild alkalosis on vbg  -Suspect from starvation ketosis   - betahydroxybutyrate 4.8  -Monitor bmp

## 2020-04-05 NOTE — PROGRESS NOTE ADULT - ATTENDING COMMENTS
Exam as per PA's  I have personally reviewed patient's labs and vital signs  # COVID. Improving respiratory status  - Day 2/5 of plaquenil, azithromycin and solumedrol  - S/p tociluzumab on 4/4    # DM. Uncontrolled diabetes but not in DKA (VBG not showing acidosis)  - Continue basal/bolus with close adjustment of insulin based on requirements especially given patient on steroids    # High anion gap  - Likely 2/2 starvation ketosis and high b-hydroxy, slowly improving, will continue to trend Exam as per PA's  I have personally reviewed patient's labs and vital signs  # COVID. Increasing O2 requirements now needing NRB  - Day 2/5 of plaquenil, azithromycin and solumedrol  - S/p tociluzumab on 4/4    # DKA. Metabolic acidosis with respiratory compensation hence normal pH.  - Continue basal/bolus with close adjustment of insulin based on requirements especially given patient on steroids    Will call ICU consult both for DKA and patient needing increased O2.

## 2020-04-05 NOTE — CHART NOTE - NSCHARTNOTEFT_GEN_A_CORE
Patient evaluated by critical care fellow and given DKA (anion gap acidosis with +ketones) and worsening hypoxemia requiring NRB, patient to be transferred to telemetry for closer monitoring and frequent fingersticks for management of DKA.     #DKA  - q2H fingersticks; for FSG <250, will get 7U correctional lispro. For FSG >250, will get 14U correctional lispro   - Currently on D10 drip; will reassess fluid status on arrival to floor  - f/u repeat BMP    #Hypoxic Respiratory Failure  - 2/2 COVID 19 infection   - currently on Venturi mask saturating in the 90s  - continue to monitor respiratory status  - rest of management as per COVID bundle

## 2020-04-05 NOTE — PROGRESS NOTE ADULT - PROBLEM SELECTOR PLAN 3
Patient with known history of type 2 diabetes. On metformin, jardiance, and januvia at home  -Will start on basal-bolus insulin regimen (15u lantus at bedtime, 5u lispro TID with meals) and moderate ISS  -F/u HgA1c Patient with known history of type 2 diabetes. On metformin, jardiance, and januvia at home  -on basal-bolus insulin regimen (15u lantus at bedtime, 5u lispro TID with meals) and moderate ISS  - HgA1c 10

## 2020-04-05 NOTE — PROGRESS NOTE ADULT - SUBJECTIVE AND OBJECTIVE BOX
O/N Events: BMP Na increased from 129 to 136 (w/in 6-8 correction), so no further intervention  Subjective: Assessed at bedside. Denies CP, SOB, N/V.     VITALS  Vital Signs Last 24 Hrs  T(C): 36.4 (2020 13:14), Max: 37.6 (2020 17:20)  T(F): 97.5 (2020 13:14), Max: 99.7 (2020 17:20)  HR: 93 (2020 13:14) (67 - 93)  BP: 120/79 (2020 13:14) (93/64 - 120/79)  BP(mean): --  RR: 12 (2020 13:14) (12 - 20)  SpO2: 93% (2020 13:14) (92% - 96%)    I&O's Summary      CAPILLARY BLOOD GLUCOSE      POCT Blood Glucose.: 221 mg/dL (2020 12:36)  POCT Blood Glucose.: 189 mg/dL (2020 08:41)  POCT Blood Glucose.: 190 mg/dL (2020 22:00)  POCT Blood Glucose.: 144 mg/dL (2020 16:34)      PHYSICAL EXAM  General: A&Ox 3; NAD  Head: NC/AT;   Eyes: PERRL; EOMI; anicteric sclera  Neck: Supple; no JVD  Respiratory: CTA B/L; no wheezes/crackles/rales auscultated w/ good air movement  Cardiovascular: Regular rhythm/rate; S1/S2; no gallops or murmurs auscultated  Gastrointestinal: Soft; NTND w/out rebound tenderness or guarding;   Extremities: WWP; no edema or cyanosis;       MEDICATIONS  (STANDING):  acetaminophen   Tablet .. 650 milliGRAM(s) Oral every 6 hours  azithromycin   Tablet 250 milliGRAM(s) Oral every 24 hours  dextrose 5%. 1000 milliLiter(s) (50 mL/Hr) IV Continuous <Continuous>  dextrose 50% Injectable 12.5 Gram(s) IV Push once  dextrose 50% Injectable 25 Gram(s) IV Push once  dextrose 50% Injectable 25 Gram(s) IV Push once  enoxaparin Injectable 40 milliGRAM(s) SubCutaneous every 24 hours  famotidine    Tablet 20 milliGRAM(s) Oral daily  hydroxychloroquine 200 milliGRAM(s) Oral every 12 hours  insulin glargine Injectable (LANTUS) 15 Unit(s) SubCutaneous at bedtime  insulin lispro (HumaLOG) corrective regimen sliding scale   SubCutaneous Before meals and at bedtime  insulin lispro Injectable (HumaLOG) 5 Unit(s) SubCutaneous three times a day before meals  lisinopril 10 milliGRAM(s) Oral daily  methylPREDNISolone sodium succinate Injectable 40 milliGRAM(s) IV Push every 12 hours  propranolol 20 milliGRAM(s) Oral three times a day    MEDICATIONS  (PRN):  acetaminophen   Tablet .. 325 milliGRAM(s) Oral every 4 hours PRN Temp greater or equal to 38C (100.4F)  ALBUTerol    90 MICROgram(s) HFA Inhaler 2 Puff(s) Inhalation every 6 hours PRN Shortness of Breath  dextrose 40% Gel 15 Gram(s) Oral once PRN Blood Glucose LESS THAN 70 milliGRAM(s)/deciliter  glucagon  Injectable 1 milliGRAM(s) IntraMuscular once PRN Glucose LESS THAN 70 milligrams/deciliter      LABS                        13.6   5.30  )-----------( 338      ( 2020 10:45 )             40.1     04-05    137  |  97  |  30<H>  ----------------------------<  304<H>  4.8   |  13<L>  |  0.62    Ca    9.6      2020 10:45  Phos  5.9     04-05  Mg     2.8     04-05    TPro  7.5  /  Alb  3.5  /  TBili  0.3  /  DBili  x   /  AST  37  /  ALT  26  /  AlkPhos  71  04-05    LIVER FUNCTIONS - ( 2020 10:45 )  Alb: 3.5 g/dL / Pro: 7.5 g/dL / ALK PHOS: 71 U/L / ALT: 26 U/L / AST: 37 U/L / GGT: x           PT/INR - ( 2020 10:24 )   PT: 11.7 sec;   INR: 1.03          PTT - ( 2020 10:24 )  PTT:31.3 sec  Urinalysis Basic - ( 2020 11:14 )    Color: Yellow / Appearance: Clear / S.010 / pH: x  Gluc: x / Ketone: 15 mg/dL  / Bili: Negative / Urobili: 0.2 E.U./dL   Blood: x / Protein: NEGATIVE mg/dL / Nitrite: NEGATIVE   Leuk Esterase: NEGATIVE / RBC: x / WBC x   Sq Epi: x / Non Sq Epi: x / Bacteria: x      CARDIAC MARKERS ( 2020 10:24 )  x     / <0.01 ng/mL / 27 U/L / x     / x Hospital course: Patient is a 59yo M with pmh of type 2 dm, htn, and mild seasonal asthma (never hospitalized for it, occasionally prescribed inhalers) presented to ED on   with one week of experiencing fevers/chills, dry cough, and generalized weakness, with shortness of breath over the last several days, found to be hypoxic to 82% on RA, placed on 15L NRB with sats increasing to 95%. found to be COVID positive and started on azithro/plaquenil therapy.  Weaned off NRB to NC on  but on  saturation around 88 % on 6L NC so switched to 15L venti mask (satting <90% on 12L venti mask). Also started on solumedrol 40mg IV bid and tocilizumab [id number]. Also found to be in DKA with Anion gap 22 with ketones in urine and acidotic (Glucose 300s).     O/N Events: BMP Na increased from 129 to 136 (w/in 6-8 correction), so no further intervention  Subjective: Assessed at bedside. Denies CP, SOB, N/V.     VITALS  Vital Signs Last 24 Hrs  T(C): 36.4 (2020 13:14), Max: 37.6 (2020 17:20)  T(F): 97.5 (2020 13:14), Max: 99.7 (2020 17:20)  HR: 93 (2020 13:14) (67 - 93)  BP: 120/79 (2020 13:14) (93/64 - 120/79)  BP(mean): --  RR: 12 (2020 13:14) (12 - 20)  SpO2: 93% (2020 13:14) (92% - 96%)    I&O's Summary      CAPILLARY BLOOD GLUCOSE      POCT Blood Glucose.: 221 mg/dL (2020 12:36)  POCT Blood Glucose.: 189 mg/dL (2020 08:41)  POCT Blood Glucose.: 190 mg/dL (2020 22:00)  POCT Blood Glucose.: 144 mg/dL (2020 16:34)      PHYSICAL EXAM  General: A&Ox 3; NAD  Head: NC/AT;   Eyes: PERRL; EOMI; anicteric sclera  Neck: Supple; no JVD  Respiratory: CTA B/L; no wheezes/crackles/rales auscultated w/ good air movement  Cardiovascular: Regular rhythm/rate; S1/S2; no gallops or murmurs auscultated  Gastrointestinal: Soft; NTND w/out rebound tenderness or guarding;   Extremities: WWP; no edema or cyanosis;       MEDICATIONS  (STANDING):  acetaminophen   Tablet .. 650 milliGRAM(s) Oral every 6 hours  azithromycin   Tablet 250 milliGRAM(s) Oral every 24 hours  dextrose 5%. 1000 milliLiter(s) (50 mL/Hr) IV Continuous <Continuous>  dextrose 50% Injectable 12.5 Gram(s) IV Push once  dextrose 50% Injectable 25 Gram(s) IV Push once  dextrose 50% Injectable 25 Gram(s) IV Push once  enoxaparin Injectable 40 milliGRAM(s) SubCutaneous every 24 hours  famotidine    Tablet 20 milliGRAM(s) Oral daily  hydroxychloroquine 200 milliGRAM(s) Oral every 12 hours  insulin glargine Injectable (LANTUS) 15 Unit(s) SubCutaneous at bedtime  insulin lispro (HumaLOG) corrective regimen sliding scale   SubCutaneous Before meals and at bedtime  insulin lispro Injectable (HumaLOG) 5 Unit(s) SubCutaneous three times a day before meals  lisinopril 10 milliGRAM(s) Oral daily  methylPREDNISolone sodium succinate Injectable 40 milliGRAM(s) IV Push every 12 hours  propranolol 20 milliGRAM(s) Oral three times a day    MEDICATIONS  (PRN):  acetaminophen   Tablet .. 325 milliGRAM(s) Oral every 4 hours PRN Temp greater or equal to 38C (100.4F)  ALBUTerol    90 MICROgram(s) HFA Inhaler 2 Puff(s) Inhalation every 6 hours PRN Shortness of Breath  dextrose 40% Gel 15 Gram(s) Oral once PRN Blood Glucose LESS THAN 70 milliGRAM(s)/deciliter  glucagon  Injectable 1 milliGRAM(s) IntraMuscular once PRN Glucose LESS THAN 70 milligrams/deciliter      LABS                        13.6   5.30  )-----------( 338      ( 2020 10:45 )             40.1     04-05    137  |  97  |  30<H>  ----------------------------<  304<H>  4.8   |  13<L>  |  0.62    Ca    9.6      2020 10:45  Phos  5.9     04-05  Mg     2.8     04-05    TPro  7.5  /  Alb  3.5  /  TBili  0.3  /  DBili  x   /  AST  37  /  ALT  26  /  AlkPhos  71  04-05    LIVER FUNCTIONS - ( 2020 10:45 )  Alb: 3.5 g/dL / Pro: 7.5 g/dL / ALK PHOS: 71 U/L / ALT: 26 U/L / AST: 37 U/L / GGT: x           PT/INR - ( 2020 10:24 )   PT: 11.7 sec;   INR: 1.03          PTT - ( 2020 10:24 )  PTT:31.3 sec  Urinalysis Basic - ( 2020 11:14 )    Color: Yellow / Appearance: Clear / S.010 / pH: x  Gluc: x / Ketone: 15 mg/dL  / Bili: Negative / Urobili: 0.2 E.U./dL   Blood: x / Protein: NEGATIVE mg/dL / Nitrite: NEGATIVE   Leuk Esterase: NEGATIVE / RBC: x / WBC x   Sq Epi: x / Non Sq Epi: x / Bacteria: x      CARDIAC MARKERS ( 2020 10:24 )  x     / <0.01 ng/mL / 27 U/L / x     / x

## 2020-04-05 NOTE — CONSULT NOTE ADULT - SUBJECTIVE AND OBJECTIVE BOX
HPI: 60yMale    INCOMPLETE    Current Meds:  acetaminophen   Tablet .. 325 milliGRAM(s) Oral every 4 hours PRN  acetaminophen   Tablet .. 650 milliGRAM(s) Oral every 6 hours  ALBUTerol    90 MICROgram(s) HFA Inhaler 2 Puff(s) Inhalation every 6 hours PRN  azithromycin   Tablet 250 milliGRAM(s) Oral every 24 hours  dextrose 40% Gel 15 Gram(s) Oral once PRN  dextrose 5%. 1000 milliLiter(s) IV Continuous <Continuous>  dextrose 50% Injectable 12.5 Gram(s) IV Push once  dextrose 50% Injectable 25 Gram(s) IV Push once  dextrose 50% Injectable 25 Gram(s) IV Push once  enoxaparin Injectable 40 milliGRAM(s) SubCutaneous every 24 hours  famotidine    Tablet 20 milliGRAM(s) Oral daily  glucagon  Injectable 1 milliGRAM(s) IntraMuscular once PRN  hydroxychloroquine 200 milliGRAM(s) Oral every 12 hours  insulin glargine Injectable (LANTUS) 15 Unit(s) SubCutaneous at bedtime  insulin lispro (HumaLOG) corrective regimen sliding scale   SubCutaneous Before meals and at bedtime  insulin lispro Injectable (HumaLOG) 5 Unit(s) SubCutaneous three times a day before meals  lisinopril 10 milliGRAM(s) Oral daily  methylPREDNISolone sodium succinate Injectable 40 milliGRAM(s) IV Push every 12 hours  propranolol 20 milliGRAM(s) Oral three times a day      ROS:  Denies the following except as indicated.    General: weight loss/weight gain, decreased appetite, fatigue  Eyes: Blurry vision, double vision, visual changes  ENT: Throat pain, changes in voice,   CV: palpitations, SOB, CP, cough  GI: NVD, difficulty swallowing, abdominal pain  : polyuria, dysuria  Endo: abnormal menses, temperature intolerance, decreased libido  MSK: weakness, joint pain  Skin: rash, dryness, diaphoresis  Heme: Easy bruising,bleeding  Neuro: HA, dizziness, lightheadedness, numbness tingling  Psych: Anxiety, Depression    Vital Signs Last 24 Hrs  T(C): 36.4 (2020 13:14), Max: 36.6 (2020 21:03)  T(F): 97.5 (2020 13:14), Max: 97.9 (2020 21:03)  HR: 93 (2020 13:14) (67 - 93)  BP: 120/79 (2020 13:14) (93/64 - 120/79)  BP(mean): --  RR: 12 (2020 13:14) (12 - 20)  SpO2: 93% (2020 13:14) (93% - 96%)  Height (cm): 160 ( @ 05:48)  Weight (kg): 70.307 ( @ 05:48)  BMI (kg/m2): 27.5 ( @ 05:48)          LABS:                        13.6   5.30  )-----------( 338      ( 2020 10:45 )             40.1     -    135  |  99  |  32<H>  ----------------------------<  333<H>  4.6   |  14<L>  |  0.61    Ca    9.4      2020 17:32  Phos  5.9     -  Mg     2.8     -    TPro  7.5  /  Alb  3.5  /  TBili  0.3  /  DBili  x   /  AST  37  /  ALT  26  /  AlkPhos  71  04-05    PT/INR - ( 2020 10:24 )   PT: 11.7 sec;   INR: 1.03          PTT - ( 2020 10:24 )  PTT:31.3 sec  Urinalysis Basic - ( 2020 11:14 )    Color: Yellow / Appearance: Clear / S.010 / pH: x  Gluc: x / Ketone: 15 mg/dL  / Bili: Negative / Urobili: 0.2 E.U./dL   Blood: x / Protein: NEGATIVE mg/dL / Nitrite: NEGATIVE   Leuk Esterase: NEGATIVE / RBC: x / WBC x   Sq Epi: x / Non Sq Epi: x / Bacteria: x      Hemoglobin A1C, Whole Blood: 10.0 ( @ 10:24)        RADIOLOGY & ADDITIONAL STUDIES:  CAPILLARY BLOOD GLUCOSE      POCT Blood Glucose.: 304 mg/dL (2020 17:49)  POCT Blood Glucose.: 221 mg/dL (2020 12:36)  POCT Blood Glucose.: 189 mg/dL (2020 08:41)  POCT Blood Glucose.: 190 mg/dL (2020 22:00)      A/P:60y Male    1.  DM  Please continue lantus       units at night / morning.    Please continue lispro      units before each meal.    Please continue lispro moderate / low dose sliding scale four times daily with meals and at bedtime  Pt's fingerstick glucose goal is   Will continue to monitor     For discharge, TBD    Pt can follow up at discharge with [facility] by calling  to make an appointment.   Will discuss case with     and update primary team HPI: 61yo M with pmh of type 2 dm, htn, and mild seasonal asthma (never hospitalized for it, occasionally prescribed inhalers) presenting with one week of experiencing fevers/chills, dry cough, and generalized weakness, with shortness of breath over the last several days. Patient states that he has felt febrile but has always been around 98F when he has taken his temperature. He has also had significantly decreased po intake and has been having 1-2 episodes of watery diarrhea over the last 3-4 days. Found to be covid positive.     Endocrine consulted for DKA. Patient noted to have elevated anion gap, acidosis, ketonuria, elevated BHB. Pt on metformin, jardiance, and januvia at home  he was started on basal-bolus insulin regimen (15u lantus at bedtime, 5u lispro TID with meals) and moderate ISS.       Current Meds:  acetaminophen   Tablet .. 325 milliGRAM(s) Oral every 4 hours PRN  acetaminophen   Tablet .. 650 milliGRAM(s) Oral every 6 hours  ALBUTerol    90 MICROgram(s) HFA Inhaler 2 Puff(s) Inhalation every 6 hours PRN  azithromycin   Tablet 250 milliGRAM(s) Oral every 24 hours  dextrose 40% Gel 15 Gram(s) Oral once PRN  dextrose 5%. 1000 milliLiter(s) IV Continuous <Continuous>  dextrose 50% Injectable 12.5 Gram(s) IV Push once  dextrose 50% Injectable 25 Gram(s) IV Push once  dextrose 50% Injectable 25 Gram(s) IV Push once  enoxaparin Injectable 40 milliGRAM(s) SubCutaneous every 24 hours  famotidine    Tablet 20 milliGRAM(s) Oral daily  glucagon  Injectable 1 milliGRAM(s) IntraMuscular once PRN  hydroxychloroquine 200 milliGRAM(s) Oral every 12 hours  insulin glargine Injectable (LANTUS) 15 Unit(s) SubCutaneous at bedtime  insulin lispro (HumaLOG) corrective regimen sliding scale   SubCutaneous Before meals and at bedtime  insulin lispro Injectable (HumaLOG) 5 Unit(s) SubCutaneous three times a day before meals  lisinopril 10 milliGRAM(s) Oral daily  methylPREDNISolone sodium succinate Injectable 40 milliGRAM(s) IV Push every 12 hours  propranolol 20 milliGRAM(s) Oral three times a day      ROS:  unable to obtain.     Vital Signs Last 24 Hrs  T(C): 36.4 (2020 13:14), Max: 36.6 (2020 21:03)  T(F): 97.5 (2020 13:14), Max: 97.9 (2020 21:03)  HR: 93 (2020 13:14) (67 - 93)  BP: 120/79 (2020 13:14) (93/64 - 120/79)  BP(mean): --  RR: 12 (2020 13:14) (12 - 20)  SpO2: 93% (2020 13:14) (93% - 96%)  Height (cm): 160 ( @ 05:48)  Weight (kg): 70.307 ( @ 05:48)  BMI (kg/m2): 27.5 ( @ 05:48)      Due to the nature of this patient’s COVID-19 isolation status (either confirmed or suspected), this note was prepared without a bedside physical examination to prevent spread of infection and to conserve personal protective equipment during this nationwide pandemic. If possible, direct patient communication occurred via electronic measures or telephone conversation. Examination highlights were provided by a bedside nurse wearing personal protective equipment and review of pertinent medical records. Objective data (vital signs, urine output, lab results, imaging studies, medications, etc) were reviewed in detail. Face to face visits and physical examination have been limited only to patients for whom it is required for medical decision making.    LABS:                        13.6   5.30  )-----------( 338      ( 2020 10:45 )             40.1     04-05    135  |  99  |  32<H>  ----------------------------<  333<H>  4.6   |  14<L>  |  0  Ca    9.4      2020 17:32  Phos  5.9     04-05  Mg     2.8     04-05    TPro  7.5  /  Alb  3.5  /  TBili  0.3  /  DBili  x   /  AST  37  /  ALT  26  /  AlkPhos  71  04-05    PT/INR - ( 2020 10:24 )   PT: 11.7 sec;   INR: 1.03          PTT - ( 2020 10:24 )  PTT:31.3 sec  Urinalysis Basic - ( 2020 11:14 )    Color: Yellow / Appearance: Clear / S.010 / pH: x  Gluc: x / Ketone: 15 mg/dL  / Bili: Negative / Urobili: 0.2 E.U./dL   Blood: x / Protein: NEGATIVE mg/dL / Nitrite: NEGATIVE   Leuk Esterase: NEGATIVE / RBC: x / WBC x   Sq Epi: x / Non Sq Epi: x / Bacteria: x      Hemoglobin A1C, Whole Blood: 10.0 ( @ 10:24)        RADIOLOGY & ADDITIONAL STUDIES:  CAPILLARY BLOOD GLUCOSE      POCT Blood Glucose.: 304 mg/dL (2020 17:49)  POCT Blood Glucose.: 221 mg/dL (2020 12:36)  POCT Blood Glucose.: 189 mg/dL (2020 08:41)  POCT Blood Glucose.: 190 mg/dL (2020 22:00)      A/P: 61yo M with pmh of type 2 dm, htn, and mild seasonal asthma found to be covid positive. Now in DKA with elevated BHB, ketosis, elevated anion gap. Pt also on solumedrol 40mg BID.     1.  DM 2, poorly controlled, HbA1c 10%.   Please make pt NPO   Please start 14 units lispro Q2H. Check BMP Q4H. Once Anion gap closes x 2: If before 4am, please give patient lantus 25 units. If gap closes after 4am, please give 12-14  units of NPH and start lantus 25 units HS monday night () at 10pm.   While receiving Q2H lispro, if blood sugars drop to below 250, please start either D10 at 75 cc's/hour or D5 at 150 cc's/hour.   Pt's fingerstick glucose goal is 140-180   Will continue to monitor     For discharge, TBD      Case discussed with Dr. Angelo and primary team updated. HPI: 61yo M with pmh of type 2 dm, htn, and mild seasonal asthma (never hospitalized for it, occasionally prescribed inhalers) presenting with one week of experiencing fevers/chills, dry cough, and generalized weakness, with shortness of breath over the last several days. Patient states that he has felt febrile but has always been around 98F when he has taken his temperature. He has also had significantly decreased po intake and has been having 1-2 episodes of watery diarrhea over the last 3-4 days. Found to be covid positive.     Endocrine consulted for DKA. Patient noted to have elevated anion gap, acidosis, ketonuria, elevated BHB. Pt on metformin, jardiance, and januvia at home  he was started on basal-bolus insulin regimen (15u lantus at bedtime, 5u lispro TID with meals) and moderate ISS.       Current Meds:  acetaminophen   Tablet .. 325 milliGRAM(s) Oral every 4 hours PRN  acetaminophen   Tablet .. 650 milliGRAM(s) Oral every 6 hours  ALBUTerol    90 MICROgram(s) HFA Inhaler 2 Puff(s) Inhalation every 6 hours PRN  azithromycin   Tablet 250 milliGRAM(s) Oral every 24 hours  dextrose 40% Gel 15 Gram(s) Oral once PRN  dextrose 5%. 1000 milliLiter(s) IV Continuous <Continuous>  dextrose 50% Injectable 12.5 Gram(s) IV Push once  dextrose 50% Injectable 25 Gram(s) IV Push once  dextrose 50% Injectable 25 Gram(s) IV Push once  enoxaparin Injectable 40 milliGRAM(s) SubCutaneous every 24 hours  famotidine    Tablet 20 milliGRAM(s) Oral daily  glucagon  Injectable 1 milliGRAM(s) IntraMuscular once PRN  hydroxychloroquine 200 milliGRAM(s) Oral every 12 hours  insulin glargine Injectable (LANTUS) 15 Unit(s) SubCutaneous at bedtime  insulin lispro (HumaLOG) corrective regimen sliding scale   SubCutaneous Before meals and at bedtime  insulin lispro Injectable (HumaLOG) 5 Unit(s) SubCutaneous three times a day before meals  lisinopril 10 milliGRAM(s) Oral daily  methylPREDNISolone sodium succinate Injectable 40 milliGRAM(s) IV Push every 12 hours  propranolol 20 milliGRAM(s) Oral three times a day      ROS:  unable to obtain.     Vital Signs Last 24 Hrs  T(C): 36.4 (2020 13:14), Max: 36.6 (2020 21:03)  T(F): 97.5 (2020 13:14), Max: 97.9 (2020 21:03)  HR: 93 (2020 13:14) (67 - 93)  BP: 120/79 (2020 13:14) (93/64 - 120/79)  BP(mean): --  RR: 12 (2020 13:14) (12 - 20)  SpO2: 93% (2020 13:14) (93% - 96%)  Height (cm): 160 ( @ 05:48)  Weight (kg): 70.307 ( @ 05:48)  BMI (kg/m2): 27.5 ( @ 05:48)      Due to the nature of this patient’s COVID-19 isolation status (either confirmed or suspected), this note was prepared without a bedside physical examination to prevent spread of infection and to conserve personal protective equipment during this nationwide pandemic. If possible, direct patient communication occurred via electronic measures or telephone conversation. Examination highlights were provided by a bedside nurse wearing personal protective equipment and review of pertinent medical records. Objective data (vital signs, urine output, lab results, imaging studies, medications, etc) were reviewed in detail. Face to face visits and physical examination have been limited only to patients for whom it is required for medical decision making.    LABS:                        13.6   5.30  )-----------( 338      ( 2020 10:45 )             40.1     04-05    135  |  99  |  32<H>  ----------------------------<  333<H>  4.6   |  14<L>  |  0  Ca    9.4      2020 17:32  Phos  5.9     04-05  Mg     2.8     04-05    TPro  7.5  /  Alb  3.5  /  TBili  0.3  /  DBili  x   /  AST  37  /  ALT  26  /  AlkPhos  71  04-05    PT/INR - ( 2020 10:24 )   PT: 11.7 sec;   INR: 1.03          PTT - ( 2020 10:24 )  PTT:31.3 sec  Urinalysis Basic - ( 2020 11:14 )    Color: Yellow / Appearance: Clear / S.010 / pH: x  Gluc: x / Ketone: 15 mg/dL  / Bili: Negative / Urobili: 0.2 E.U./dL   Blood: x / Protein: NEGATIVE mg/dL / Nitrite: NEGATIVE   Leuk Esterase: NEGATIVE / RBC: x / WBC x   Sq Epi: x / Non Sq Epi: x / Bacteria: x      Hemoglobin A1C, Whole Blood: 10.0 ( @ 10:24)        RADIOLOGY & ADDITIONAL STUDIES:  CAPILLARY BLOOD GLUCOSE      POCT Blood Glucose.: 304 mg/dL (2020 17:49)  POCT Blood Glucose.: 221 mg/dL (2020 12:36)  POCT Blood Glucose.: 189 mg/dL (2020 08:41)  POCT Blood Glucose.: 190 mg/dL (2020 22:00)      A/P: 61yo M with pmh of type 2 dm, htn, and mild seasonal asthma found to be covid positive. Now in DKA with elevated BHB, ketosis, elevated anion gap. Pt also on solumedrol 40mg BID.     1.  DM 2, poorly controlled, HbA1c 10%.   Please make pt NPO   Please start 14 units lispro Q2H and check finger stick Q2H. Check BMP Q4H.   Once Anion gap closes x 2: If before 4am, please give patient lantus 25 units. If gap closes after 4am, please give 12 units of NPH and start lantus 25 units HS monday night () at 10pm.   While receiving Q2H lispro, if blood sugars drop to below 250, please start either D10 at 75 cc's/hour or D5 at 150 cc's/hour.   Pt's fingerstick glucose goal is 140-180   Will continue to monitor     For discharge, TBD      Case discussed with Dr. Angelo and primary team updated.

## 2020-04-05 NOTE — PROGRESS NOTE ADULT - PROBLEM SELECTOR PLAN 1
Patient with subjective fevers/chills, dry cough, and generalized weakness, with shortness of breath over the last several days, found to be hypoxic to 82% on RA on presentation to Ed. Found to be COVID positive. CXR with bilateral patchy infiltrates. Pt currently satting 92% on 4L NC  - Isolation precautions; contact and airborne  - f/u COVID-19 remaining admission labs  - trend COVID-19 labs  - Monitor O2 saturation, monitor for respiratory distress, currently at 92-93% on 4L NC  - O2 via NC --> ventimask --> NRB as necessary  - albuterol MDI; avoid duonebs  - c/w hydroxychloroquine 400mg IV BID x2 doses, then 200mg BID for 4 days  - c/w azithromycin 250mg QD  - tylenol standing 675mg PO given pt's inflammatory markers, with 325mg q6 hours prn for fevers despite standing Tylenol. Can d/c standing Tylenol if pt continues to be afebrile   FOR DECOMPENSATION  - consider tocilizumab  - consider steroids 40 IV BID    # ADDENDUM  - Patient seems to be in storm, will start solumedrol 40mg IV bid and tocilizumab [id number] Patient with subjective fevers/chills, dry cough, and generalized weakness, with shortness of breath over the last several days, found to be hypoxic to 82% on RA on presentation to Ed. Found to be COVID positive. CXR with bilateral patchy infiltrates. Pt currently satting 92% on 4L NC  - Isolation precautions; contact and airborne  - f/u COVID-19 remaining admission labs  - trend COVID-19 labs  - Monitor O2 saturation, monitor for respiratory distress, currently at 92-93% on 4L NC  - O2 via NC --> ventimask --> NRB as necessary  - albuterol MDI; avoid duonebs  - c/w hydroxychloroquine 400mg IV BID x2 doses, then 200mg BID for 4 days  - c/w azithromycin 250mg QD  - tylenol standing 675mg PO given pt's inflammatory markers, with 325mg q6 hours prn for fevers despite standing Tylenol. Can d/c standing Tylenol if pt continues to be afebrile   - started on solumedrol 40mg IV bid and tocilizumab [id number] -Pt currently satting 92% on 4L NC  - Isolation precautions; contact and airborne  - trend COVID-19 labs  - Monitor O2 saturation, monitor for respiratory distress, currently at 92-93% on 4L NC  - O2 via NC --> ventimask --> NRB as necessary  - albuterol MDI; avoid duonebs  - c/w hydroxychloroquine 400mg IV BID x2 doses, then 200mg BID for 4 days  - c/w azithromycin 250mg QD  - tylenol standing 675mg PO given pt's inflammatory markers, with 325mg q6 hours prn for fevers despite standing Tylenol. Can d/c standing Tylenol if pt continues to be afebrile   - started on solumedrol 40mg IV bid and tocilizumab [id number]

## 2020-04-06 LAB
ALBUMIN SERPL ELPH-MCNC: 3.3 G/DL — SIGNIFICANT CHANGE UP (ref 3.3–5)
ALP SERPL-CCNC: 78 U/L — SIGNIFICANT CHANGE UP (ref 40–120)
ALT FLD-CCNC: 25 U/L — SIGNIFICANT CHANGE UP (ref 10–45)
ANION GAP SERPL CALC-SCNC: 13 MMOL/L — SIGNIFICANT CHANGE UP (ref 5–17)
ANION GAP SERPL CALC-SCNC: 13 MMOL/L — SIGNIFICANT CHANGE UP (ref 5–17)
ANION GAP SERPL CALC-SCNC: 17 MMOL/L — SIGNIFICANT CHANGE UP (ref 5–17)
ANION GAP SERPL CALC-SCNC: 19 MMOL/L — HIGH (ref 5–17)
ANION GAP SERPL CALC-SCNC: 20 MMOL/L — HIGH (ref 5–17)
AST SERPL-CCNC: 28 U/L — SIGNIFICANT CHANGE UP (ref 10–40)
BASOPHILS # BLD AUTO: 0.01 K/UL — SIGNIFICANT CHANGE UP (ref 0–0.2)
BASOPHILS NFR BLD AUTO: 0.1 % — SIGNIFICANT CHANGE UP (ref 0–2)
BILIRUB SERPL-MCNC: 0.3 MG/DL — SIGNIFICANT CHANGE UP (ref 0.2–1.2)
BUN SERPL-MCNC: 14 MG/DL — SIGNIFICANT CHANGE UP (ref 7–23)
BUN SERPL-MCNC: 16 MG/DL — SIGNIFICANT CHANGE UP (ref 7–23)
BUN SERPL-MCNC: 18 MG/DL — SIGNIFICANT CHANGE UP (ref 7–23)
BUN SERPL-MCNC: 22 MG/DL — SIGNIFICANT CHANGE UP (ref 7–23)
BUN SERPL-MCNC: 29 MG/DL — HIGH (ref 7–23)
C PEPTIDE SERPL-MCNC: 1.6 NG/ML — SIGNIFICANT CHANGE UP (ref 1.1–4.4)
CALCIUM SERPL-MCNC: 9.3 MG/DL — SIGNIFICANT CHANGE UP (ref 8.4–10.5)
CALCIUM SERPL-MCNC: 9.4 MG/DL — SIGNIFICANT CHANGE UP (ref 8.4–10.5)
CALCIUM SERPL-MCNC: 9.5 MG/DL — SIGNIFICANT CHANGE UP (ref 8.4–10.5)
CHLORIDE SERPL-SCNC: 102 MMOL/L — SIGNIFICANT CHANGE UP (ref 96–108)
CHLORIDE SERPL-SCNC: 103 MMOL/L — SIGNIFICANT CHANGE UP (ref 96–108)
CHLORIDE SERPL-SCNC: 104 MMOL/L — SIGNIFICANT CHANGE UP (ref 96–108)
CO2 SERPL-SCNC: 16 MMOL/L — LOW (ref 22–31)
CO2 SERPL-SCNC: 18 MMOL/L — LOW (ref 22–31)
CO2 SERPL-SCNC: 20 MMOL/L — LOW (ref 22–31)
CO2 SERPL-SCNC: 21 MMOL/L — LOW (ref 22–31)
CO2 SERPL-SCNC: 22 MMOL/L — SIGNIFICANT CHANGE UP (ref 22–31)
CREAT SERPL-MCNC: 0.39 MG/DL — LOW (ref 0.5–1.3)
CREAT SERPL-MCNC: 0.39 MG/DL — LOW (ref 0.5–1.3)
CREAT SERPL-MCNC: 0.45 MG/DL — LOW (ref 0.5–1.3)
CREAT SERPL-MCNC: 0.45 MG/DL — LOW (ref 0.5–1.3)
CREAT SERPL-MCNC: 0.55 MG/DL — SIGNIFICANT CHANGE UP (ref 0.5–1.3)
CRP SERPL-MCNC: 19.63 MG/DL — HIGH (ref 0–0.4)
D DIMER BLD IA.RAPID-MCNC: 826 NG/ML DDU — HIGH
EOSINOPHIL # BLD AUTO: 0 K/UL — SIGNIFICANT CHANGE UP (ref 0–0.5)
EOSINOPHIL NFR BLD AUTO: 0 % — SIGNIFICANT CHANGE UP (ref 0–6)
FERRITIN SERPL-MCNC: 1680 NG/ML — HIGH (ref 30–400)
GLUCOSE BLDC GLUCOMTR-MCNC: 136 MG/DL — HIGH (ref 70–99)
GLUCOSE BLDC GLUCOMTR-MCNC: 166 MG/DL — HIGH (ref 70–99)
GLUCOSE BLDC GLUCOMTR-MCNC: 169 MG/DL — HIGH (ref 70–99)
GLUCOSE BLDC GLUCOMTR-MCNC: 173 MG/DL — HIGH (ref 70–99)
GLUCOSE BLDC GLUCOMTR-MCNC: 181 MG/DL — HIGH (ref 70–99)
GLUCOSE BLDC GLUCOMTR-MCNC: 193 MG/DL — HIGH (ref 70–99)
GLUCOSE BLDC GLUCOMTR-MCNC: 194 MG/DL — HIGH (ref 70–99)
GLUCOSE BLDC GLUCOMTR-MCNC: 198 MG/DL — HIGH (ref 70–99)
GLUCOSE BLDC GLUCOMTR-MCNC: 204 MG/DL — HIGH (ref 70–99)
GLUCOSE BLDC GLUCOMTR-MCNC: 210 MG/DL — HIGH (ref 70–99)
GLUCOSE BLDC GLUCOMTR-MCNC: 211 MG/DL — HIGH (ref 70–99)
GLUCOSE SERPL-MCNC: 141 MG/DL — HIGH (ref 70–99)
GLUCOSE SERPL-MCNC: 193 MG/DL — HIGH (ref 70–99)
GLUCOSE SERPL-MCNC: 210 MG/DL — HIGH (ref 70–99)
GLUCOSE SERPL-MCNC: 213 MG/DL — HIGH (ref 70–99)
GLUCOSE SERPL-MCNC: 219 MG/DL — HIGH (ref 70–99)
HCT VFR BLD CALC: 44.7 % — SIGNIFICANT CHANGE UP (ref 39–50)
HGB BLD-MCNC: 14.9 G/DL — SIGNIFICANT CHANGE UP (ref 13–17)
IMM GRANULOCYTES NFR BLD AUTO: 0.6 % — SIGNIFICANT CHANGE UP (ref 0–1.5)
LYMPHOCYTES # BLD AUTO: 0.65 K/UL — LOW (ref 1–3.3)
LYMPHOCYTES # BLD AUTO: 6.7 % — LOW (ref 13–44)
MAGNESIUM SERPL-MCNC: 2.8 MG/DL — HIGH (ref 1.6–2.6)
MCHC RBC-ENTMCNC: 31 PG — SIGNIFICANT CHANGE UP (ref 27–34)
MCHC RBC-ENTMCNC: 33.3 GM/DL — SIGNIFICANT CHANGE UP (ref 32–36)
MCV RBC AUTO: 92.9 FL — SIGNIFICANT CHANGE UP (ref 80–100)
MONOCYTES # BLD AUTO: 0.38 K/UL — SIGNIFICANT CHANGE UP (ref 0–0.9)
MONOCYTES NFR BLD AUTO: 3.9 % — SIGNIFICANT CHANGE UP (ref 2–14)
NEUTROPHILS # BLD AUTO: 8.57 K/UL — HIGH (ref 1.8–7.4)
NEUTROPHILS NFR BLD AUTO: 88.7 % — HIGH (ref 43–77)
NRBC # BLD: 0 /100 WBCS — SIGNIFICANT CHANGE UP (ref 0–0)
PHOSPHATE SERPL-MCNC: 2.4 MG/DL — LOW (ref 2.5–4.5)
PLATELET # BLD AUTO: 378 K/UL — SIGNIFICANT CHANGE UP (ref 150–400)
POTASSIUM SERPL-MCNC: 3.9 MMOL/L — SIGNIFICANT CHANGE UP (ref 3.5–5.3)
POTASSIUM SERPL-MCNC: 4 MMOL/L — SIGNIFICANT CHANGE UP (ref 3.5–5.3)
POTASSIUM SERPL-MCNC: 4.1 MMOL/L — SIGNIFICANT CHANGE UP (ref 3.5–5.3)
POTASSIUM SERPL-MCNC: 4.2 MMOL/L — SIGNIFICANT CHANGE UP (ref 3.5–5.3)
POTASSIUM SERPL-MCNC: 4.5 MMOL/L — SIGNIFICANT CHANGE UP (ref 3.5–5.3)
POTASSIUM SERPL-SCNC: 3.9 MMOL/L — SIGNIFICANT CHANGE UP (ref 3.5–5.3)
POTASSIUM SERPL-SCNC: 4 MMOL/L — SIGNIFICANT CHANGE UP (ref 3.5–5.3)
POTASSIUM SERPL-SCNC: 4.1 MMOL/L — SIGNIFICANT CHANGE UP (ref 3.5–5.3)
POTASSIUM SERPL-SCNC: 4.2 MMOL/L — SIGNIFICANT CHANGE UP (ref 3.5–5.3)
POTASSIUM SERPL-SCNC: 4.5 MMOL/L — SIGNIFICANT CHANGE UP (ref 3.5–5.3)
PROT SERPL-MCNC: 7.2 G/DL — SIGNIFICANT CHANGE UP (ref 6–8.3)
RBC # BLD: 4.81 M/UL — SIGNIFICANT CHANGE UP (ref 4.2–5.8)
RBC # FLD: 12.9 % — SIGNIFICANT CHANGE UP (ref 10.3–14.5)
SODIUM SERPL-SCNC: 137 MMOL/L — SIGNIFICANT CHANGE UP (ref 135–145)
SODIUM SERPL-SCNC: 137 MMOL/L — SIGNIFICANT CHANGE UP (ref 135–145)
SODIUM SERPL-SCNC: 139 MMOL/L — SIGNIFICANT CHANGE UP (ref 135–145)
SODIUM SERPL-SCNC: 140 MMOL/L — SIGNIFICANT CHANGE UP (ref 135–145)
SODIUM SERPL-SCNC: 141 MMOL/L — SIGNIFICANT CHANGE UP (ref 135–145)
WBC # BLD: 9.67 K/UL — SIGNIFICANT CHANGE UP (ref 3.8–10.5)
WBC # FLD AUTO: 9.67 K/UL — SIGNIFICANT CHANGE UP (ref 3.8–10.5)

## 2020-04-06 RX ORDER — INSULIN LISPRO 100/ML
5 VIAL (ML) SUBCUTANEOUS ONCE
Refills: 0 | Status: DISCONTINUED | OUTPATIENT
Start: 2020-04-06 | End: 2020-04-06

## 2020-04-06 RX ORDER — INSULIN LISPRO 100/ML
8 VIAL (ML) SUBCUTANEOUS
Refills: 0 | Status: DISCONTINUED | OUTPATIENT
Start: 2020-04-06 | End: 2020-04-07

## 2020-04-06 RX ORDER — HUMAN INSULIN 100 [IU]/ML
12 INJECTION, SUSPENSION SUBCUTANEOUS ONCE
Refills: 0 | Status: DISCONTINUED | OUTPATIENT
Start: 2020-04-06 | End: 2020-04-06

## 2020-04-06 RX ORDER — INSULIN GLARGINE 100 [IU]/ML
25 INJECTION, SOLUTION SUBCUTANEOUS AT BEDTIME
Refills: 0 | Status: DISCONTINUED | OUTPATIENT
Start: 2020-04-06 | End: 2020-04-07

## 2020-04-06 RX ORDER — INSULIN LISPRO 100/ML
VIAL (ML) SUBCUTANEOUS
Refills: 0 | Status: DISCONTINUED | OUTPATIENT
Start: 2020-04-06 | End: 2020-04-12

## 2020-04-06 RX ORDER — POTASSIUM CHLORIDE 20 MEQ
40 PACKET (EA) ORAL ONCE
Refills: 0 | Status: COMPLETED | OUTPATIENT
Start: 2020-04-06 | End: 2020-04-06

## 2020-04-06 RX ADMIN — Medication 120 MILLILITER(S): at 03:12

## 2020-04-06 RX ADMIN — Medication 40 MILLIEQUIVALENT(S): at 00:41

## 2020-04-06 RX ADMIN — FAMOTIDINE 20 MILLIGRAM(S): 10 INJECTION INTRAVENOUS at 11:12

## 2020-04-06 RX ADMIN — Medication 200 MILLIGRAM(S): at 17:44

## 2020-04-06 RX ADMIN — AZITHROMYCIN 250 MILLIGRAM(S): 500 TABLET, FILM COATED ORAL at 21:37

## 2020-04-06 RX ADMIN — Medication 7 UNIT(S): at 15:05

## 2020-04-06 RX ADMIN — Medication 650 MILLIGRAM(S): at 23:03

## 2020-04-06 RX ADMIN — Medication 650 MILLIGRAM(S): at 11:12

## 2020-04-06 RX ADMIN — Medication 7 UNIT(S): at 06:58

## 2020-04-06 RX ADMIN — Medication 7 UNIT(S): at 13:21

## 2020-04-06 RX ADMIN — Medication 7 UNIT(S): at 03:11

## 2020-04-06 RX ADMIN — Medication 650 MILLIGRAM(S): at 05:08

## 2020-04-06 RX ADMIN — Medication 650 MILLIGRAM(S): at 17:45

## 2020-04-06 RX ADMIN — Medication 7 UNIT(S): at 09:18

## 2020-04-06 RX ADMIN — Medication 7 UNIT(S): at 01:01

## 2020-04-06 RX ADMIN — Medication 200 MILLIGRAM(S): at 05:07

## 2020-04-06 RX ADMIN — Medication 2: at 21:36

## 2020-04-06 RX ADMIN — Medication 40 MILLIGRAM(S): at 05:07

## 2020-04-06 RX ADMIN — Medication 7 UNIT(S): at 05:06

## 2020-04-06 RX ADMIN — ENOXAPARIN SODIUM 40 MILLIGRAM(S): 100 INJECTION SUBCUTANEOUS at 15:40

## 2020-04-06 RX ADMIN — Medication 90 MILLILITER(S): at 06:57

## 2020-04-06 RX ADMIN — Medication 40 MILLIGRAM(S): at 17:44

## 2020-04-06 RX ADMIN — INSULIN GLARGINE 25 UNIT(S): 100 INJECTION, SOLUTION SUBCUTANEOUS at 21:36

## 2020-04-06 RX ADMIN — LISINOPRIL 10 MILLIGRAM(S): 2.5 TABLET ORAL at 09:19

## 2020-04-06 NOTE — PROGRESS NOTE ADULT - ASSESSMENT
Patient is a 61yo M with pmh of type 2 dm, htn, and mild seasonal asthma (never hospitalized for it, occasionally prescribed inhalers) presenting with one week of experiencing fevers/chills, dry cough, and generalized weakness, with shortness of breath over the last several days, found to be hypoxic to 82% on RA, now stable on 6L NC, found to be COVID positive.

## 2020-04-06 NOTE — PROGRESS NOTE ADULT - PROBLEM SELECTOR PLAN 3
Patient with known history of type 2 diabetes. On metformin, jardiance, and januvia at home  - Found to be in DKA on the floors, since resolved   - Transitioned to 8u TID and 25u Lantus at night

## 2020-04-06 NOTE — PROGRESS NOTE ADULT - ATTENDING COMMENTS
Agree with above.   no acute overnight events  pt remains NPO  awake and alert on supplemental oxygen.   Due to the nature of this patient’s COVID-19 isolation status (either confirmed or suspected), this note was prepared without a bedside physical examination to prevent spread of infection and to conserve personal protective equipment during this nationwide pandemic. Objective data (vital signs, urine output, lab results, imaging studies, medications, etc) were reviewed in detail. Physical examinations have been limited only to patients for whom it is required for medical decision making.    Pt now with resolved DKA, can transition to basal bolus  start 25 units lantus at bedtime, lispro 8 units with meals, but if very good PO intake will most likely require 12 units due to steroids.   carb consistent diet  glucose goal 100-180  check lipid profile, start statin if LDL < 70  will follow

## 2020-04-06 NOTE — PROGRESS NOTE ADULT - PROBLEM SELECTOR PLAN 2
#Increased anion gap  Patient with AG of 27 with ketones in urine. Lactate 2.0. No history of renal failure. Not in DKA given mild alkalosis on vbg  -Suspect from starvation ketosis   - betahydroxybutyrate 4.8  -Monitor bmp

## 2020-04-06 NOTE — PROGRESS NOTE ADULT - SUBJECTIVE AND OBJECTIVE BOX
INTERVAL HPI/OVERNIGHT EVENTS:    Patient is a 60y old  Male who presents with a chief complaint of COVID (05 Apr 2020 19:25)      Pt reports the following symptoms:    CONSTITUTIONAL:  Negative fever or chills, feels well, good appetite  EYES:  Negative  blurry vision or double vision  CARDIOVASCULAR:  Negative for chest pain or palpitations  RESPIRATORY:  Negative for cough, wheezing, or SOB   GASTROINTESTINAL:  Negative for nausea, vomiting, diarrhea, constipation, or abdominal pain  GENITOURINARY:  Negative frequency, urgency or dysuria  NEUROLOGIC:  No headache, confusion, dizziness, lightheadedness    MEDICATIONS  (STANDING):  acetaminophen   Tablet .. 650 milliGRAM(s) Oral every 6 hours  azithromycin   Tablet 250 milliGRAM(s) Oral every 24 hours  dextrose 10%. 1000 milliLiter(s) (60 mL/Hr) IV Continuous <Continuous>  dextrose 5%. 1000 milliLiter(s) (50 mL/Hr) IV Continuous <Continuous>  dextrose 50% Injectable 12.5 Gram(s) IV Push once  enoxaparin Injectable 40 milliGRAM(s) SubCutaneous every 24 hours  famotidine    Tablet 20 milliGRAM(s) Oral daily  hydroxychloroquine 200 milliGRAM(s) Oral every 12 hours  insulin glargine Injectable (LANTUS) 25 Unit(s) SubCutaneous at bedtime  insulin lispro Injectable (HumaLOG) 7 Unit(s) SubCutaneous every 2 hours  lisinopril 10 milliGRAM(s) Oral daily  methylPREDNISolone sodium succinate Injectable 40 milliGRAM(s) IV Push every 12 hours  propranolol 20 milliGRAM(s) Oral three times a day    MEDICATIONS  (PRN):  acetaminophen   Tablet .. 325 milliGRAM(s) Oral every 4 hours PRN Temp greater or equal to 38C (100.4F)  ALBUTerol    90 MICROgram(s) HFA Inhaler 2 Puff(s) Inhalation every 6 hours PRN Shortness of Breath  dextrose 40% Gel 15 Gram(s) Oral once PRN Blood Glucose LESS THAN 70 milliGRAM(s)/deciliter  glucagon  Injectable 1 milliGRAM(s) IntraMuscular once PRN Glucose LESS THAN 70 milligrams/deciliter      PHYSICAL EXAM  Vital Signs Last 24 Hrs  T(C): 36.7 (06 Apr 2020 05:23), Max: 36.7 (05 Apr 2020 22:08)  T(F): 98 (06 Apr 2020 05:23), Max: 98 (05 Apr 2020 22:08)  HR: 73 (06 Apr 2020 11:54) (71 - 77)  BP: 128/78 (06 Apr 2020 11:54) (113/77 - 140/89)  BP(mean): 96 (06 Apr 2020 11:54) (91 - 109)  RR: 21 (06 Apr 2020 11:54) (17 - 29)  SpO2: 89% (06 Apr 2020 11:54) (89% - 96%)    Constitutional: wn/wd in NAD.   HEENT: NCAT, MMM, OP clear, EOMI, no proptosis or lid retraction  Neck: no thyromegaly or palpable thyroid nodules   Respiratory: lungs CTAB.  Cardiovascular: regular rhythm, normal S1 and S2, no audible murmurs, no peripheral edema  GI: soft, NT/ND, no masses/HSM appreciated.  Neurology: no tremors, DTR 2+  Skin: no visible rashes/lesions  Psychiatric: AAO x 3, normal affect/mood.    LABS:                        14.9   9.67  )-----------( 378      ( 06 Apr 2020 09:41 )             44.7     04-06    141  |  104  |  18  ----------------------------<  219<H>  4.5   |  18<L>  |  0.45<L>    Ca    9.5      06 Apr 2020 09:41  Phos  2.4     04-06  Mg     2.8     04-06    TPro  7.2  /  Alb  3.3  /  TBili  0.3  /  DBili  x   /  AST  28  /  ALT  25  /  AlkPhos  78  04-06            HbA1C: 10.0 % (04-04 @ 10:24)    CAPILLARY BLOOD GLUCOSE      POCT Blood Glucose.: 198 mg/dL (06 Apr 2020 13:11)  POCT Blood Glucose.: 193 mg/dL (06 Apr 2020 11:37)  POCT Blood Glucose.: 210 mg/dL (06 Apr 2020 09:05)  POCT Blood Glucose.: 211 mg/dL (06 Apr 2020 06:52)  POCT Blood Glucose.: 166 mg/dL (06 Apr 2020 05:01)  POCT Blood Glucose.: 136 mg/dL (06 Apr 2020 03:01)  POCT Blood Glucose.: 173 mg/dL (06 Apr 2020 00:55)  POCT Blood Glucose.: 214 mg/dL (05 Apr 2020 22:45)  POCT Blood Glucose.: 120 mg/dL (05 Apr 2020 21:19)  POCT Blood Glucose.: 241 mg/dL (05 Apr 2020 20:30)  POCT Blood Glucose.: 304 mg/dL (05 Apr 2020 17:49)      Insulin Sliding Scale requirements X 24 Hours:    RADIOLOGY & ADDITIONAL TESTS:    A/P: 60y Male with history of DM type II presenting for       1.  DM -     Please continue           units lantus at bedtime  / in the morning and        units lispro with meals and lispro moderate / low dose sliding scale 4 times daily with meals and at bedtime.  Please continue consistent carbohydrate diet.      Goal FSG is   Will continue to monitor   For discharge, pt can continue    Pt can follow up at discharge with [facility] by calling  to make an appointment.   Will discuss case with     and update primary team INTERVAL HPI/OVERNIGHT EVENTS:    Patient is a 60y old  Male who presents with a chief complaint of COVID (05 Apr 2020 19:25)  Spoke to the primary team and the nurse taking care of the patient. He is currently on ventimask with Fio2 40%. His BMP today evening showed the DKA has resolved. His c-peptide was 1.6.  he is currently on solumedrol 40mg Iv Q12 and is currently NPO for the DKA protocol. His FSg and insulin administration reviewed.      Pt reports the following symptoms:  CONSTITUTIONAL:  Negative fever or chills  CARDIOVASCULAR:  Negative for chest pain or palpitations  RESPIRATORY:  Negative for cough, wheezing  GASTROINTESTINAL:  Negative for diarrhea, constipation, or abdominal pain  NEUROLOGIC:  No headache, confusion, dizziness, lightheadedness    MEDICATIONS  (STANDING):  acetaminophen   Tablet .. 650 milliGRAM(s) Oral every 6 hours  azithromycin   Tablet 250 milliGRAM(s) Oral every 24 hours  dextrose 10%. 1000 milliLiter(s) (60 mL/Hr) IV Continuous <Continuous>  dextrose 5%. 1000 milliLiter(s) (50 mL/Hr) IV Continuous <Continuous>  dextrose 50% Injectable 12.5 Gram(s) IV Push once  enoxaparin Injectable 40 milliGRAM(s) SubCutaneous every 24 hours  famotidine    Tablet 20 milliGRAM(s) Oral daily  hydroxychloroquine 200 milliGRAM(s) Oral every 12 hours  insulin glargine Injectable (LANTUS) 25 Unit(s) SubCutaneous at bedtime  insulin lispro Injectable (HumaLOG) 7 Unit(s) SubCutaneous every 2 hours  lisinopril 10 milliGRAM(s) Oral daily  methylPREDNISolone sodium succinate Injectable 40 milliGRAM(s) IV Push every 12 hours  propranolol 20 milliGRAM(s) Oral three times a day    MEDICATIONS  (PRN):  acetaminophen   Tablet .. 325 milliGRAM(s) Oral every 4 hours PRN Temp greater or equal to 38C (100.4F)  ALBUTerol    90 MICROgram(s) HFA Inhaler 2 Puff(s) Inhalation every 6 hours PRN Shortness of Breath  dextrose 40% Gel 15 Gram(s) Oral once PRN Blood Glucose LESS THAN 70 milliGRAM(s)/deciliter  glucagon  Injectable 1 milliGRAM(s) IntraMuscular once PRN Glucose LESS THAN 70 milligrams/deciliter      PHYSICAL EXAM  Vital Signs Last 24 Hrs  T(C): 36.7 (06 Apr 2020 05:23), Max: 36.7 (05 Apr 2020 22:08)  T(F): 98 (06 Apr 2020 05:23), Max: 98 (05 Apr 2020 22:08)  HR: 73 (06 Apr 2020 11:54) (71 - 77)  BP: 128/78 (06 Apr 2020 11:54) (113/77 - 140/89)  BP(mean): 96 (06 Apr 2020 11:54) (91 - 109)  RR: 21 (06 Apr 2020 11:54) (17 - 29)  SpO2: 89% (06 Apr 2020 11:54) (89% - 96%)    Due to the nature of this patient’s COVID-19 isolation status (either confirmed or suspected), this note was prepared without a bedside physical examination to prevent spread of infection and to conserve personal protective equipment during this nationwide pandemic. If possible, direct patient communication occurred via electronic measures or telephone conversation. Examination highlights were provided by a bedside nurse wearing personal protective equipment and review of pertinent medical records. Objective data (vital signs, urine output, lab results, imaging studies, medications, etc) were reviewed in detail. Face to face visits and physical examination have been limited only to patients for whom it is required for medical decision making.      LABS:                        14.9   9.67  )-----------( 378      ( 06 Apr 2020 09:41 )             44.7     04-06    141  |  104  |  18  ----------------------------<  219<H>  4.5   |  18<L>  |  0.45<L>    Ca    9.5      06 Apr 2020 09:41  Phos  2.4     04-06  Mg     2.8     04-06    TPro  7.2  /  Alb  3.3  /  TBili  0.3  /  DBili  x   /  AST  28  /  ALT  25  /  AlkPhos  78  04-06            HbA1C: 10.0 % (04-04 @ 10:24)    CAPILLARY BLOOD GLUCOSE      POCT Blood Glucose.: 198 mg/dL (06 Apr 2020 13:11)  POCT Blood Glucose.: 193 mg/dL (06 Apr 2020 11:37)  POCT Blood Glucose.: 210 mg/dL (06 Apr 2020 09:05)  POCT Blood Glucose.: 211 mg/dL (06 Apr 2020 06:52)  POCT Blood Glucose.: 166 mg/dL (06 Apr 2020 05:01)  POCT Blood Glucose.: 136 mg/dL (06 Apr 2020 03:01)  POCT Blood Glucose.: 173 mg/dL (06 Apr 2020 00:55)  POCT Blood Glucose.: 214 mg/dL (05 Apr 2020 22:45)  POCT Blood Glucose.: 120 mg/dL (05 Apr 2020 21:19)  POCT Blood Glucose.: 241 mg/dL (05 Apr 2020 20:30)  POCT Blood Glucose.: 304 mg/dL (05 Apr 2020 17:49)      Insulin Sliding Scale requirements X 24 Hours:    RADIOLOGY & ADDITIONAL TESTS:    A/P: 59yo M with pmh of type 2 dm, htn, and mild seasonal asthma found to be covid positive. presented with DKA with elevated BHB, ketosis, elevated anion gap. Now, the DKA has resolved.     1.  DM 2, poorly controlled, HbA1c 10% - with complications, DKA and steroid induced hyperglycemia  Since the AG has closed x2, please stop the D10 and lispro Q2H protocol  - Please give Lantus 25 units at 600 PM today.  - If patient is kept NPO, please start on regular insulin moderate dose sliding scale Q6H.  - If the patient is started on feeds, please start on lispro 8 units before each meal and lispro moderate sliding scale before meals and bedtime.  - carb consistent diet  - on solumedrol 40mg BID.     Pt's fingerstick glucose goal is 140-180   Will continue to monitor     For discharge, TBD      Case discussed with Dr. Angelo and primary team updated.

## 2020-04-06 NOTE — PROGRESS NOTE ADULT - PROBLEM SELECTOR PLAN 1
-Pt currently satting 92% on 4L NC  - Isolation precautions; contact and airborne  - trend COVID-19 labs  - Monitor O2 saturation, monitor for respiratory distress, currently at 92-93% on 4L NC  - O2 via NC --> ventimask --> NRB as necessary  - albuterol MDI; avoid duonebs  - c/w hydroxychloroquine 400mg IV BID x2 doses, then 200mg BID for 4 days  - c/w azithromycin 250mg QD  - tylenol standing 675mg PO given pt's inflammatory markers, with 325mg q6 hours prn for fevers despite standing Tylenol. Can d/c standing Tylenol if pt continues to be afebrile   - started on solumedrol 40mg IV bid and tocilizumab [id number]

## 2020-04-06 NOTE — PROGRESS NOTE ADULT - SUBJECTIVE AND OBJECTIVE BOX
OVERNIGHT EVENTS:  Patient transferred to Tele given presence of DKA. Maintained on 7u Lispro q2 hours. Gap closed x 2 today. Patient to be given 25u Lantus tonight and started on 8u TID.   Patient had no complaints when examined at bedside.     VITALS  T(C): 36.4 (2020 13:14), Max: 37.6 (2020 17:20)  T(F): 97.5 (2020 13:14), Max: 99.7 (2020 17:20)  HR: 93 (2020 13:14) (67 - 93)  BP: 120/79 (2020 13:14) (93/64 - 120/79)  RR: 12 (2020 13:14) (12 - 20)  SpO2: 93% (2020 13:14) (92% - 96%)      CAPILLARY BLOOD GLUCOSE  POCT Blood Glucose.: 221 mg/dL (2020 12:36)  POCT Blood Glucose.: 189 mg/dL (2020 08:41)  POCT Blood Glucose.: 190 mg/dL (2020 22:00)  POCT Blood Glucose.: 144 mg/dL (2020 16:34)      PHYSICAL EXAM  General: A&Ox 3; NAD  Head: NC/AT;   Eyes: PERRL; EOMI; anicteric sclera  Neck: Supple; no JVD  Respiratory: CTA B/L; no wheezes/crackles/rales auscultated w/ good air movement  Cardiovascular: Regular rhythm/rate; S1/S2; no gallops or murmurs auscultated  Gastrointestinal: Soft; NTND w/out rebound tenderness or guarding;   Extremities: WWP; no edema or cyanosis;       MEDICATIONS  (STANDING):  acetaminophen   Tablet .. 650 milliGRAM(s) Oral every 6 hours  azithromycin   Tablet 250 milliGRAM(s) Oral every 24 hours  dextrose 5%. 1000 milliLiter(s) (50 mL/Hr) IV Continuous <Continuous>  dextrose 50% Injectable 12.5 Gram(s) IV Push once  dextrose 50% Injectable 25 Gram(s) IV Push once  dextrose 50% Injectable 25 Gram(s) IV Push once  enoxaparin Injectable 40 milliGRAM(s) SubCutaneous every 24 hours  famotidine    Tablet 20 milliGRAM(s) Oral daily  hydroxychloroquine 200 milliGRAM(s) Oral every 12 hours  insulin glargine Injectable (LANTUS) 15 Unit(s) SubCutaneous at bedtime  insulin lispro (HumaLOG) corrective regimen sliding scale   SubCutaneous Before meals and at bedtime  insulin lispro Injectable (HumaLOG) 5 Unit(s) SubCutaneous three times a day before meals  lisinopril 10 milliGRAM(s) Oral daily  methylPREDNISolone sodium succinate Injectable 40 milliGRAM(s) IV Push every 12 hours  propranolol 20 milliGRAM(s) Oral three times a day    MEDICATIONS  (PRN):  acetaminophen   Tablet .. 325 milliGRAM(s) Oral every 4 hours PRN Temp greater or equal to 38C (100.4F)  ALBUTerol    90 MICROgram(s) HFA Inhaler 2 Puff(s) Inhalation every 6 hours PRN Shortness of Breath  dextrose 40% Gel 15 Gram(s) Oral once PRN Blood Glucose LESS THAN 70 milliGRAM(s)/deciliter  glucagon  Injectable 1 milliGRAM(s) IntraMuscular once PRN Glucose LESS THAN 70 milligrams/deciliter      LABS                        13.6   5.30  )-----------( 338      ( 2020 10:45 )             40.1     04-05    137  |  97  |  30<H>  ----------------------------<  304<H>  4.8   |  13<L>  |  0.62    Ca    9.6      2020 10:45  Phos  5.9     04-05  Mg     2.8     04-05    TPro  7.5  /  Alb  3.5  /  TBili  0.3  /  DBili  x   /  AST  37  /  ALT  26  /  AlkPhos  71  04-05    LIVER FUNCTIONS - ( 2020 10:45 )  Alb: 3.5 g/dL / Pro: 7.5 g/dL / ALK PHOS: 71 U/L / ALT: 26 U/L / AST: 37 U/L / GGT: x           PT/INR - ( 2020 10:24 )   PT: 11.7 sec;   INR: 1.03          PTT - ( 2020 10:24 )  PTT:31.3 sec  Urinalysis Basic - ( 2020 11:14 )    Color: Yellow / Appearance: Clear / S.010 / pH: x  Gluc: x / Ketone: 15 mg/dL  / Bili: Negative / Urobili: 0.2 E.U./dL   Blood: x / Protein: NEGATIVE mg/dL / Nitrite: NEGATIVE   Leuk Esterase: NEGATIVE / RBC: x / WBC x   Sq Epi: x / Non Sq Epi: x / Bacteria: x      CARDIAC MARKERS ( 2020 10:24 )  x     / <0.01 ng/mL / 27 U/L / x     / x

## 2020-04-07 DIAGNOSIS — E87.2 ACIDOSIS: ICD-10-CM

## 2020-04-07 LAB
ALBUMIN SERPL ELPH-MCNC: 3.5 G/DL — SIGNIFICANT CHANGE UP (ref 3.3–5)
ALP SERPL-CCNC: 85 U/L — SIGNIFICANT CHANGE UP (ref 40–120)
ALT FLD-CCNC: 31 U/L — SIGNIFICANT CHANGE UP (ref 10–45)
ANION GAP SERPL CALC-SCNC: 19 MMOL/L — HIGH (ref 5–17)
AST SERPL-CCNC: 41 U/L — HIGH (ref 10–40)
BASOPHILS # BLD AUTO: 0.01 K/UL — SIGNIFICANT CHANGE UP (ref 0–0.2)
BASOPHILS NFR BLD AUTO: 0.1 % — SIGNIFICANT CHANGE UP (ref 0–2)
BILIRUB SERPL-MCNC: 0.6 MG/DL — SIGNIFICANT CHANGE UP (ref 0.2–1.2)
BUN SERPL-MCNC: 18 MG/DL — SIGNIFICANT CHANGE UP (ref 7–23)
C PEPTIDE SERPL-MCNC: 1.1 NG/ML — SIGNIFICANT CHANGE UP (ref 1.1–4.4)
CALCIUM SERPL-MCNC: 9.6 MG/DL — SIGNIFICANT CHANGE UP (ref 8.4–10.5)
CHLORIDE SERPL-SCNC: 100 MMOL/L — SIGNIFICANT CHANGE UP (ref 96–108)
CHOLEST SERPL-MCNC: 199 MG/DL — SIGNIFICANT CHANGE UP (ref 10–199)
CO2 SERPL-SCNC: 19 MMOL/L — LOW (ref 22–31)
CREAT SERPL-MCNC: 0.46 MG/DL — LOW (ref 0.5–1.3)
CRP SERPL-MCNC: 8.74 MG/DL — HIGH (ref 0–0.4)
D DIMER BLD IA.RAPID-MCNC: 2024 NG/ML DDU — HIGH
EOSINOPHIL # BLD AUTO: 0 K/UL — SIGNIFICANT CHANGE UP (ref 0–0.5)
EOSINOPHIL NFR BLD AUTO: 0 % — SIGNIFICANT CHANGE UP (ref 0–6)
FERRITIN SERPL-MCNC: 1718 NG/ML — HIGH (ref 30–400)
G6PD RBC-CCNC: 12.3 U/G HGB — SIGNIFICANT CHANGE UP (ref 7–20.5)
GLUCOSE BLDC GLUCOMTR-MCNC: 191 MG/DL — HIGH (ref 70–99)
GLUCOSE BLDC GLUCOMTR-MCNC: 222 MG/DL — HIGH (ref 70–99)
GLUCOSE BLDC GLUCOMTR-MCNC: 224 MG/DL — HIGH (ref 70–99)
GLUCOSE BLDC GLUCOMTR-MCNC: 234 MG/DL — HIGH (ref 70–99)
GLUCOSE SERPL-MCNC: 254 MG/DL — HIGH (ref 70–99)
HCT VFR BLD CALC: 48.1 % — SIGNIFICANT CHANGE UP (ref 39–50)
HDLC SERPL-MCNC: 33 MG/DL — LOW
HGB BLD-MCNC: 16.2 G/DL — SIGNIFICANT CHANGE UP (ref 13–17)
IMM GRANULOCYTES NFR BLD AUTO: 0.4 % — SIGNIFICANT CHANGE UP (ref 0–1.5)
LIPID PNL WITH DIRECT LDL SERPL: 137 MG/DL — HIGH
LYMPHOCYTES # BLD AUTO: 0.6 K/UL — LOW (ref 1–3.3)
LYMPHOCYTES # BLD AUTO: 7.5 % — LOW (ref 13–44)
MAGNESIUM SERPL-MCNC: 2.7 MG/DL — HIGH (ref 1.6–2.6)
MCHC RBC-ENTMCNC: 30.9 PG — SIGNIFICANT CHANGE UP (ref 27–34)
MCHC RBC-ENTMCNC: 33.7 GM/DL — SIGNIFICANT CHANGE UP (ref 32–36)
MCV RBC AUTO: 91.8 FL — SIGNIFICANT CHANGE UP (ref 80–100)
MONOCYTES # BLD AUTO: 0.28 K/UL — SIGNIFICANT CHANGE UP (ref 0–0.9)
MONOCYTES NFR BLD AUTO: 3.5 % — SIGNIFICANT CHANGE UP (ref 2–14)
NEUTROPHILS # BLD AUTO: 7.1 K/UL — SIGNIFICANT CHANGE UP (ref 1.8–7.4)
NEUTROPHILS NFR BLD AUTO: 88.5 % — HIGH (ref 43–77)
NRBC # BLD: 0 /100 WBCS — SIGNIFICANT CHANGE UP (ref 0–0)
PHOSPHATE SERPL-MCNC: 3.1 MG/DL — SIGNIFICANT CHANGE UP (ref 2.5–4.5)
PLATELET # BLD AUTO: 418 K/UL — HIGH (ref 150–400)
POTASSIUM SERPL-MCNC: 4.6 MMOL/L — SIGNIFICANT CHANGE UP (ref 3.5–5.3)
POTASSIUM SERPL-SCNC: 4.6 MMOL/L — SIGNIFICANT CHANGE UP (ref 3.5–5.3)
PROT SERPL-MCNC: 7.2 G/DL — SIGNIFICANT CHANGE UP (ref 6–8.3)
RBC # BLD: 5.24 M/UL — SIGNIFICANT CHANGE UP (ref 4.2–5.8)
RBC # FLD: 12.8 % — SIGNIFICANT CHANGE UP (ref 10.3–14.5)
SODIUM SERPL-SCNC: 138 MMOL/L — SIGNIFICANT CHANGE UP (ref 135–145)
TOTAL CHOLESTEROL/HDL RATIO MEASUREMENT: 6 RATIO — SIGNIFICANT CHANGE UP (ref 3.4–9.6)
TRIGL SERPL-MCNC: 143 MG/DL — SIGNIFICANT CHANGE UP (ref 10–149)
WBC # BLD: 8.02 K/UL — SIGNIFICANT CHANGE UP (ref 3.8–10.5)
WBC # FLD AUTO: 8.02 K/UL — SIGNIFICANT CHANGE UP (ref 3.8–10.5)

## 2020-04-07 RX ORDER — ATORVASTATIN CALCIUM 80 MG/1
40 TABLET, FILM COATED ORAL AT BEDTIME
Refills: 0 | Status: DISCONTINUED | OUTPATIENT
Start: 2020-04-07 | End: 2020-04-12

## 2020-04-07 RX ORDER — ZOLPIDEM TARTRATE 10 MG/1
5 TABLET ORAL AT BEDTIME
Refills: 0 | Status: DISCONTINUED | OUTPATIENT
Start: 2020-04-07 | End: 2020-04-12

## 2020-04-07 RX ORDER — INSULIN LISPRO 100/ML
14 VIAL (ML) SUBCUTANEOUS
Refills: 0 | Status: DISCONTINUED | OUTPATIENT
Start: 2020-04-07 | End: 2020-04-10

## 2020-04-07 RX ORDER — ENOXAPARIN SODIUM 100 MG/ML
70 INJECTION SUBCUTANEOUS EVERY 12 HOURS
Refills: 0 | Status: DISCONTINUED | OUTPATIENT
Start: 2020-04-07 | End: 2020-04-12

## 2020-04-07 RX ORDER — INSULIN GLARGINE 100 [IU]/ML
30 INJECTION, SOLUTION SUBCUTANEOUS AT BEDTIME
Refills: 0 | Status: DISCONTINUED | OUTPATIENT
Start: 2020-04-07 | End: 2020-04-08

## 2020-04-07 RX ADMIN — Medication 4: at 12:05

## 2020-04-07 RX ADMIN — Medication 650 MILLIGRAM(S): at 10:54

## 2020-04-07 RX ADMIN — FAMOTIDINE 20 MILLIGRAM(S): 10 INJECTION INTRAVENOUS at 10:54

## 2020-04-07 RX ADMIN — Medication 4: at 17:45

## 2020-04-07 RX ADMIN — Medication 8 UNIT(S): at 17:31

## 2020-04-07 RX ADMIN — Medication 650 MILLIGRAM(S): at 17:31

## 2020-04-07 RX ADMIN — Medication 8 UNIT(S): at 08:57

## 2020-04-07 RX ADMIN — LISINOPRIL 10 MILLIGRAM(S): 2.5 TABLET ORAL at 10:54

## 2020-04-07 RX ADMIN — Medication 650 MILLIGRAM(S): at 05:31

## 2020-04-07 RX ADMIN — ATORVASTATIN CALCIUM 40 MILLIGRAM(S): 80 TABLET, FILM COATED ORAL at 22:30

## 2020-04-07 RX ADMIN — Medication 4: at 23:07

## 2020-04-07 RX ADMIN — Medication 650 MILLIGRAM(S): at 22:35

## 2020-04-07 RX ADMIN — Medication 40 MILLIGRAM(S): at 05:30

## 2020-04-07 RX ADMIN — INSULIN GLARGINE 30 UNIT(S): 100 INJECTION, SOLUTION SUBCUTANEOUS at 22:31

## 2020-04-07 RX ADMIN — Medication 8 UNIT(S): at 12:05

## 2020-04-07 RX ADMIN — Medication 200 MILLIGRAM(S): at 05:31

## 2020-04-07 RX ADMIN — Medication 40 MILLIGRAM(S): at 17:30

## 2020-04-07 RX ADMIN — ZOLPIDEM TARTRATE 5 MILLIGRAM(S): 10 TABLET ORAL at 23:29

## 2020-04-07 RX ADMIN — AZITHROMYCIN 250 MILLIGRAM(S): 500 TABLET, FILM COATED ORAL at 22:32

## 2020-04-07 RX ADMIN — Medication 4: at 08:57

## 2020-04-07 RX ADMIN — ENOXAPARIN SODIUM 70 MILLIGRAM(S): 100 INJECTION SUBCUTANEOUS at 22:30

## 2020-04-07 RX ADMIN — ENOXAPARIN SODIUM 70 MILLIGRAM(S): 100 INJECTION SUBCUTANEOUS at 12:29

## 2020-04-07 RX ADMIN — Medication 200 MILLIGRAM(S): at 17:30

## 2020-04-07 NOTE — PROGRESS NOTE ADULT - PROBLEM SELECTOR PLAN 1
Pt currently on venti (50%) saturating 90-95%.   - Isolation precautions; contact and airborne  - trend COVID-19 labs  - Monitor O2 saturation, monitor for respiratory distress  - O2 via NC --> ventimask --> NRB as necessary  - albuterol MDI; avoid duonebs  - c/w hydroxychloroquine 400mg IV BID x2 doses, then 200mg BID for 4 days  - c/w azithromycin 250mg QD  - tylenol standing 675mg PO given pt's inflammatory markers, with 325mg q6 hours prn for fevers despite standing Tylenol. Can d/c standing Tylenol if pt continues to be afebrile   - started on solumedrol 40mg IV bid and tocilizumab [id number]

## 2020-04-07 NOTE — PROGRESS NOTE ADULT - PROBLEM SELECTOR PLAN 2
#Increased anion gap  Patient with AG of 27 with ketones in urine. Lactate 2.0. No history of renal failure. Likely from DKA -- > now resolved  - betahydroxybutyrate 4.8  -Monitor bmp

## 2020-04-07 NOTE — PROGRESS NOTE ADULT - PROBLEM SELECTOR PLAN 7
F: none  E: Replete for K<4 and Mag <2  N: Diet Carb Consistnet  A: Lovenox (full)   Dispo: tele  Code: FULL CODE

## 2020-04-07 NOTE — PROGRESS NOTE ADULT - SUBJECTIVE AND OBJECTIVE BOX
INTERVAL EVENTS: No acute events overnight. Started full AC for rise in d-dimer.         MEDICATIONS  (STANDING):  acetaminophen   Tablet .. 650 milliGRAM(s) Oral every 6 hours  azithromycin   Tablet 250 milliGRAM(s) Oral every 24 hours  dextrose 5%. 1000 milliLiter(s) (50 mL/Hr) IV Continuous <Continuous>  dextrose 50% Injectable 12.5 Gram(s) IV Push once  enoxaparin Injectable 70 milliGRAM(s) SubCutaneous every 12 hours  famotidine    Tablet 20 milliGRAM(s) Oral daily  hydroxychloroquine 200 milliGRAM(s) Oral every 12 hours  insulin glargine Injectable (LANTUS) 25 Unit(s) SubCutaneous at bedtime  insulin lispro (HumaLOG) corrective regimen sliding scale   SubCutaneous Before meals and at bedtime  insulin lispro Injectable (HumaLOG) 8 Unit(s) SubCutaneous three times a day before meals  lisinopril 10 milliGRAM(s) Oral daily  methylPREDNISolone sodium succinate Injectable 40 milliGRAM(s) IV Push every 12 hours  propranolol 20 milliGRAM(s) Oral three times a day    MEDICATIONS  (PRN):  acetaminophen   Tablet .. 325 milliGRAM(s) Oral every 4 hours PRN Temp greater or equal to 38C (100.4F)  ALBUTerol    90 MICROgram(s) HFA Inhaler 2 Puff(s) Inhalation every 6 hours PRN Shortness of Breath  dextrose 40% Gel 15 Gram(s) Oral once PRN Blood Glucose LESS THAN 70 milliGRAM(s)/deciliter  glucagon  Injectable 1 milliGRAM(s) IntraMuscular once PRN Glucose LESS THAN 70 milligrams/deciliter      Vital Signs Last 24 Hrs  T(C): 36.1 (07 Apr 2020 11:05), Max: 36.4 (06 Apr 2020 14:22)  T(F): 97 (07 Apr 2020 11:05), Max: 97.6 (06 Apr 2020 14:22)  HR: 72 (07 Apr 2020 11:05) (60 - 72)  BP: 116/79 (07 Apr 2020 11:05) (102/68 - 129/85)  BP(mean): 93 (07 Apr 2020 11:05) (81 - 102)  RR: 28 (07 Apr 2020 11:05) (20 - 28)  SpO2: 90% (07 Apr 2020 11:05) (89% - 96%)     PHYSICAL EXAM:  General: A&Ox 3; NAD  Head: NC/AT;   Eyes: PERRL; EOMI; anicteric sclera  Neck: Supple; no JVD  Respiratory: CTA B/L; no wheezes/crackles/rales auscultated w/ good air movement  Cardiovascular: Regular rhythm/rate; S1/S2; no gallops or murmurs auscultated  Gastrointestinal: Soft; NTND w/out rebound tenderness or guarding;   Extremities: WWP; no edema or cyanosis;     LABS:                        16.2   8.02  )-----------( 418      ( 07 Apr 2020 09:53 )             48.1     04-07    138  |  100  |  18  ----------------------------<  254<H>  4.6   |  19<L>  |  0.46<L>    Ca    9.6      07 Apr 2020 09:53  Phos  3.1     04-07  Mg     2.7     04-07    TPro  7.2  /  Alb  3.5  /  TBili  0.6  /  DBili  x   /  AST  41<H>  /  ALT  31  /  AlkPhos  85  04-07            I&O's Summary    06 Apr 2020 07:01  -  07 Apr 2020 07:00  --------------------------------------------------------  IN: 600 mL / OUT: 1025 mL / NET: -425 mL    07 Apr 2020 07:01  -  07 Apr 2020 13:35  --------------------------------------------------------  IN: 0 mL / OUT: 200 mL / NET: -200 mL      BNP  RADIOLOGY & ADDITIONAL STUDIES:    TELEMETRY:    EKG:

## 2020-04-07 NOTE — PROGRESS NOTE ADULT - ATTENDING COMMENTS
Agree with above.  Pt now with resolved DKA  eating well  no acute overnight events  insulin administration reviewed.   steroid administration reviewed  Due to the nature of this patient’s COVID-19 isolation status (either confirmed or suspected), this note was prepared without a bedside physical examination to prevent spread of infection and to conserve personal protective equipment during this nationwide pandemic. Objective data (vital signs, urine output, lab results, imaging studies, medications, etc) were reviewed in detail. Physical examinations have been limited only to patients for whom it is required for medical decision making.    continue lantus 30 at bedtime, lispro 14 units TID with meals, moderate scale ISF 25 with meals and at bedtime.   statin is indicated for LDL > 70, atorvastatin 40mg daily  will follow

## 2020-04-07 NOTE — PROGRESS NOTE ADULT - SUBJECTIVE AND OBJECTIVE BOX
INTERVAL HPI/OVERNIGHT EVENTS:    Patient is a 60y old  Male who presents with a chief complaint of COVID (06 Apr 2020 21:20)      Pt reports the following symptoms:    CONSTITUTIONAL:  Negative fever or chills, feels well, good appetite  EYES:  Negative  blurry vision or double vision  CARDIOVASCULAR:  Negative for chest pain or palpitations  RESPIRATORY:  Negative for cough, wheezing, or SOB   GASTROINTESTINAL:  Negative for nausea, vomiting, diarrhea, constipation, or abdominal pain  GENITOURINARY:  Negative frequency, urgency or dysuria  NEUROLOGIC:  No headache, confusion, dizziness, lightheadedness    MEDICATIONS  (STANDING):  acetaminophen   Tablet .. 650 milliGRAM(s) Oral every 6 hours  azithromycin   Tablet 250 milliGRAM(s) Oral every 24 hours  dextrose 5%. 1000 milliLiter(s) (50 mL/Hr) IV Continuous <Continuous>  dextrose 50% Injectable 12.5 Gram(s) IV Push once  enoxaparin Injectable 70 milliGRAM(s) SubCutaneous every 12 hours  famotidine    Tablet 20 milliGRAM(s) Oral daily  hydroxychloroquine 200 milliGRAM(s) Oral every 12 hours  insulin glargine Injectable (LANTUS) 25 Unit(s) SubCutaneous at bedtime  insulin lispro (HumaLOG) corrective regimen sliding scale   SubCutaneous Before meals and at bedtime  insulin lispro Injectable (HumaLOG) 8 Unit(s) SubCutaneous three times a day before meals  lisinopril 10 milliGRAM(s) Oral daily  methylPREDNISolone sodium succinate Injectable 40 milliGRAM(s) IV Push every 12 hours  propranolol 20 milliGRAM(s) Oral three times a day    MEDICATIONS  (PRN):  acetaminophen   Tablet .. 325 milliGRAM(s) Oral every 4 hours PRN Temp greater or equal to 38C (100.4F)  ALBUTerol    90 MICROgram(s) HFA Inhaler 2 Puff(s) Inhalation every 6 hours PRN Shortness of Breath  dextrose 40% Gel 15 Gram(s) Oral once PRN Blood Glucose LESS THAN 70 milliGRAM(s)/deciliter  glucagon  Injectable 1 milliGRAM(s) IntraMuscular once PRN Glucose LESS THAN 70 milligrams/deciliter      PHYSICAL EXAM  Vital Signs Last 24 Hrs  T(C): 36.1 (07 Apr 2020 11:05), Max: 36.4 (06 Apr 2020 14:22)  T(F): 97 (07 Apr 2020 11:05), Max: 97.6 (06 Apr 2020 14:22)  HR: 72 (07 Apr 2020 11:05) (60 - 75)  BP: 116/79 (07 Apr 2020 11:05) (102/68 - 130/85)  BP(mean): 93 (07 Apr 2020 11:05) (81 - 102)  RR: 28 (07 Apr 2020 11:05) (20 - 31)  SpO2: 90% (07 Apr 2020 11:05) (89% - 96%)    Constitutional: wn/wd in NAD.   HEENT: NCAT, MMM, OP clear, EOMI, no proptosis or lid retraction  Neck: no thyromegaly or palpable thyroid nodules   Respiratory: lungs CTAB.  Cardiovascular: regular rhythm, normal S1 and S2, no audible murmurs, no peripheral edema  GI: soft, NT/ND, no masses/HSM appreciated.  Neurology: no tremors, DTR 2+  Skin: no visible rashes/lesions  Psychiatric: AAO x 3, normal affect/mood.    LABS:                        16.2   8.02  )-----------( 418      ( 07 Apr 2020 09:53 )             48.1     04-06    137  |  102  |  14  ----------------------------<  193<H>  4.0   |  22  |  0.39<L>    Ca    9.4      06 Apr 2020 17:12  Phos  2.4     04-06  Mg     2.8     04-06    TPro  7.2  /  Alb  3.3  /  TBili  0.3  /  DBili  x   /  AST  28  /  ALT  25  /  AlkPhos  78  04-06            HbA1C: 10.0 % (04-04 @ 10:24)    CAPILLARY BLOOD GLUCOSE      POCT Blood Glucose.: 234 mg/dL (07 Apr 2020 11:40)  POCT Blood Glucose.: 194 mg/dL (06 Apr 2020 21:14)  POCT Blood Glucose.: 181 mg/dL (06 Apr 2020 19:14)  POCT Blood Glucose.: 169 mg/dL (06 Apr 2020 17:06)  POCT Blood Glucose.: 204 mg/dL (06 Apr 2020 14:55)  POCT Blood Glucose.: 198 mg/dL (06 Apr 2020 13:11)      Insulin Sliding Scale requirements X 24 Hours:    RADIOLOGY & ADDITIONAL TESTS:    A/P: 60y Male with history of DM type II presenting for       1.  DM -     Please continue           units lantus at bedtime  / in the morning and        units lispro with meals and lispro moderate / low dose sliding scale 4 times daily with meals and at bedtime.  Please continue consistent carbohydrate diet.      Goal FSG is   Will continue to monitor   For discharge, pt can continue    Pt can follow up at discharge with [facility] by calling  to make an appointment.   Will discuss case with     and update primary team INTERVAL HPI/OVERNIGHT EVENTS:    Patient is a 60y old  Male who presents with a chief complaint of COVID (05 Apr 2020 19:25)  Spoke to the primary team and the nurse taking care of the patient. He is currently on ventimask. His D10 was discontinued yesterday evening at 6 PM. he was started on diet today morning. his BMP showed Hcp3 19 and AG 19 today. His appetite has been okay. His c-peptide was 1.6.  he is currently on solumedrol 40mg Iv Q12. His FSG and insulin administration reviewed.      Pt reports the following symptoms:  CONSTITUTIONAL:  Negative fever or chills  CARDIOVASCULAR:  Negative for chest pain or palpitations  RESPIRATORY:  Negative for cough, wheezing  GASTROINTESTINAL:  Negative for diarrhea, constipation, or abdominal pain  NEUROLOGIC:  No headache, confusion, dizziness, lightheadedness      MEDICATIONS  (STANDING):  acetaminophen   Tablet .. 650 milliGRAM(s) Oral every 6 hours  azithromycin   Tablet 250 milliGRAM(s) Oral every 24 hours  dextrose 5%. 1000 milliLiter(s) (50 mL/Hr) IV Continuous <Continuous>  dextrose 50% Injectable 12.5 Gram(s) IV Push once  enoxaparin Injectable 70 milliGRAM(s) SubCutaneous every 12 hours  famotidine    Tablet 20 milliGRAM(s) Oral daily  hydroxychloroquine 200 milliGRAM(s) Oral every 12 hours  insulin glargine Injectable (LANTUS) 25 Unit(s) SubCutaneous at bedtime  insulin lispro (HumaLOG) corrective regimen sliding scale   SubCutaneous Before meals and at bedtime  insulin lispro Injectable (HumaLOG) 8 Unit(s) SubCutaneous three times a day before meals  lisinopril 10 milliGRAM(s) Oral daily  methylPREDNISolone sodium succinate Injectable 40 milliGRAM(s) IV Push every 12 hours  propranolol 20 milliGRAM(s) Oral three times a day    MEDICATIONS  (PRN):  acetaminophen   Tablet .. 325 milliGRAM(s) Oral every 4 hours PRN Temp greater or equal to 38C (100.4F)  ALBUTerol    90 MICROgram(s) HFA Inhaler 2 Puff(s) Inhalation every 6 hours PRN Shortness of Breath  dextrose 40% Gel 15 Gram(s) Oral once PRN Blood Glucose LESS THAN 70 milliGRAM(s)/deciliter  glucagon  Injectable 1 milliGRAM(s) IntraMuscular once PRN Glucose LESS THAN 70 milligrams/deciliter      PHYSICAL EXAM  Vital Signs Last 24 Hrs  T(C): 36.1 (07 Apr 2020 11:05), Max: 36.4 (06 Apr 2020 14:22)  T(F): 97 (07 Apr 2020 11:05), Max: 97.6 (06 Apr 2020 14:22)  HR: 72 (07 Apr 2020 11:05) (60 - 75)  BP: 116/79 (07 Apr 2020 11:05) (102/68 - 130/85)  BP(mean): 93 (07 Apr 2020 11:05) (81 - 102)  RR: 28 (07 Apr 2020 11:05) (20 - 31)  SpO2: 90% (07 Apr 2020 11:05) (89% - 96%)    Due to the nature of this patient’s COVID-19 isolation status (either confirmed or suspected), this note was prepared without a bedside physical examination to prevent spread of infection and to conserve personal protective equipment during this nationwide pandemic. If possible, direct patient communication occurred via electronic measures or telephone conversation. Examination highlights were provided by a bedside nurse wearing personal protective equipment and review of pertinent medical records. Objective data (vital signs, urine output, lab results, imaging studies, medications, etc) were reviewed in detail. Face to face visits and physical examination have been limited only to patients for whom it is required for medical decision making.      LABS:                        16.2   8.02  )-----------( 418      ( 07 Apr 2020 09:53 )             48.1     04-06    137  |  102  |  14  ----------------------------<  193<H>  4.0   |  22  |  0.39<L>    Ca    9.4      06 Apr 2020 17:12  Phos  2.4     04-06  Mg     2.8     04-06    TPro  7.2  /  Alb  3.3  /  TBili  0.3  /  DBili  x   /  AST  28  /  ALT  25  /  AlkPhos  78  04-06            HbA1C: 10.0 % (04-04 @ 10:24)    CAPILLARY BLOOD GLUCOSE      POCT Blood Glucose.: 234 mg/dL (07 Apr 2020 11:40)  POCT Blood Glucose.: 194 mg/dL (06 Apr 2020 21:14)  POCT Blood Glucose.: 181 mg/dL (06 Apr 2020 19:14)  POCT Blood Glucose.: 169 mg/dL (06 Apr 2020 17:06)  POCT Blood Glucose.: 204 mg/dL (06 Apr 2020 14:55)  POCT Blood Glucose.: 198 mg/dL (06 Apr 2020 13:11)      Insulin Sliding Scale requirements X 24 Hours:    RADIOLOGY & ADDITIONAL TESTS:    A/P: 59yo M with pmh of type 2 dm, htn, and mild seasonal asthma found to be covid positive. presented with DKA with elevated BHB, ketosis, elevated anion gap. Now, the DKA has resolved.     1.  DM 2, poorly controlled, HbA1c 10% - with complications, DKA resolved and steroid induced hyperglycemia   - hba1c 10  Cr 0.46  - Please increase to lantus 30 units at bedtime today   please increase lispro 14 units before each meal  Please continue lispro moderate sliding scale before meals and bedtime.  - carb consistent diet  - on solumedrol 40mg BID.   Pt's fingerstick glucose goal is 140-180   Will continue to monitor     2. hyperlipidemia  - LDL goal < 70  - Please start on lipitor 40 mg once daily    For discharge, TBD      Case discussed with Dr. Angelo and primary team updated.

## 2020-04-07 NOTE — CHART NOTE - NSCHARTNOTEFT_GEN_A_CORE
Admitting Diagnosis:   Patient is a 60y old  Male who presents with a chief complaint of COVID (06 Apr 2020 21:20)      Consult: Yes [   ]  No [  X  ]  Reason for Initial Nutrition Assessment: LOS       PAST MEDICAL & SURGICAL HISTORY:  Asthma  Hypertension  Diabetes  H/O shoulder surgery  H/O knee surgery      Current Nutrition Order: DASH TLC Consistent Carbohydrate   PO Intake: Good (%) [ X  ]  Fair (50-75%) [   ] Poor (<25%) [   ]  GI Issues: WDL per flow sheets, Last Documented BM 4/4   Pain: none per flow sheets  Skin: no edema/pressure ulcers noted at this time     Labs:   04-06    137  |  102  |  14  ----------------------------<  193<H>  4.0   |  22  |  0.39<L>    Ca    9.4      06 Apr 2020 17:12  Phos  2.4     04-06  Mg     2.8     04-06    TPro  7.2  /  Alb  3.3  /  TBili  0.3  /  DBili  x   /  AST  28  /  ALT  25  /  AlkPhos  78  04-06    CAPILLARY BLOOD GLUCOSE      POCT Blood Glucose.: 194 mg/dL (06 Apr 2020 21:14)  POCT Blood Glucose.: 181 mg/dL (06 Apr 2020 19:14)  POCT Blood Glucose.: 169 mg/dL (06 Apr 2020 17:06)  POCT Blood Glucose.: 204 mg/dL (06 Apr 2020 14:55)  POCT Blood Glucose.: 198 mg/dL (06 Apr 2020 13:11)  POCT Blood Glucose.: 193 mg/dL (06 Apr 2020 11:37)    Nutritionally Pertinent Lab Values:   A1c 10% 4/4  Phos: most recent reading Low, elevated prior   last 3 POCT 194 181 169   Na, K WDL   CHOL Low, LDL elevated   BUN WDL, Cr low     Medications:  MEDICATIONS  (STANDING):  acetaminophen   Tablet .. 650 milliGRAM(s) Oral every 6 hours  azithromycin   Tablet 250 milliGRAM(s) Oral every 24 hours  dextrose 5%. 1000 milliLiter(s) (50 mL/Hr) IV Continuous <Continuous>  dextrose 50% Injectable 12.5 Gram(s) IV Push once  enoxaparin Injectable 40 milliGRAM(s) SubCutaneous every 24 hours  famotidine    Tablet 20 milliGRAM(s) Oral daily  hydroxychloroquine 200 milliGRAM(s) Oral every 12 hours  insulin glargine Injectable (LANTUS) 25 Unit(s) SubCutaneous at bedtime  insulin lispro (HumaLOG) corrective regimen sliding scale   SubCutaneous Before meals and at bedtime  insulin lispro Injectable (HumaLOG) 8 Unit(s) SubCutaneous three times a day before meals  lisinopril 10 milliGRAM(s) Oral daily  methylPREDNISolone sodium succinate Injectable 40 milliGRAM(s) IV Push every 12 hours  propranolol 20 milliGRAM(s) Oral three times a day    MEDICATIONS  (PRN):  acetaminophen   Tablet .. 325 milliGRAM(s) Oral every 4 hours PRN Temp greater or equal to 38C (100.4F)  ALBUTerol    90 MICROgram(s) HFA Inhaler 2 Puff(s) Inhalation every 6 hours PRN Shortness of Breath  dextrose 40% Gel 15 Gram(s) Oral once PRN Blood Glucose LESS THAN 70 milliGRAM(s)/deciliter  glucagon  Injectable 1 milliGRAM(s) IntraMuscular once PRN Glucose LESS THAN 70 milligrams/deciliter      5'3''  pounds +/-10%   (4/5) 155 pounds   BMI 27.5 %[id number]%    IBW used to calculate energy needs due to pt's current body weight exceeding 120% of IBW  Needs adjusted for age based on hypermetabolic state 2/2 viral infection -- suggest upper end of EER   1400-1680kcal/day (25-30kcal/kg)  56-67g protein/day (1-1.2g pro/kg)  Fluids per team     Nutrition Focused Physical Exam: Completed [   ]  Unable to complete [  X - d/t COVID)    Subjective:   61yo M pmh of type 2 dm, htn, mild seasonal asthma (never hospitalized for it, occasionally prescribed inhalers) presenting with fevers/chills, dry cough, generalized weakness, shortness of breath, significantly decreased po intake, 1-2 episodes of watery. Admitted for SARS-associated coronavirus infection; CXR with bilateral patchy infiltrates. Noted steroid induced hyperglycemia, DKA since on floors - since resolved, Gap closed x2; ENDO following.   Face to Face interview deferred 2/2 COVID, Attempted to reach pt via Phone however unavailable. Spoke with RN. Pt currently ordered for DASH TLC consCHO no snack diet 4/6, RN reports pt eating well at this time.   Please see below for nutritions recommendations. Pending order placed.     Nutrition Diagnosis: Increased nutrient needs RT increased demand for energy and protein AEB hypermetabolic state 2/2 viral infection (COVID19+)   Goal: Pt will meet at least 75% of nutrient needs     Recommendations:  1. DASH TLC Consistent Carbohydrate with evening snack.   2. Monitor PO intake/appetite, GI distress, diet tolerance, labs, weights, Skin, GOC.   3. Micro-nutrients/Vitamins/Minerals per team.   4. RD to remain available for additional nutrition interventions as needed.     Education: NA   Risk Level: High [   ] Moderate [ X ] Low [   ]

## 2020-04-08 LAB
ALBUMIN SERPL ELPH-MCNC: 3.2 G/DL — LOW (ref 3.3–5)
ALP SERPL-CCNC: 87 U/L — SIGNIFICANT CHANGE UP (ref 40–120)
ALT FLD-CCNC: 26 U/L — SIGNIFICANT CHANGE UP (ref 10–45)
ANION GAP SERPL CALC-SCNC: 11 MMOL/L — SIGNIFICANT CHANGE UP (ref 5–17)
AST SERPL-CCNC: 21 U/L — SIGNIFICANT CHANGE UP (ref 10–40)
BASOPHILS # BLD AUTO: 0.01 K/UL — SIGNIFICANT CHANGE UP (ref 0–0.2)
BASOPHILS NFR BLD AUTO: 0.2 % — SIGNIFICANT CHANGE UP (ref 0–2)
BILIRUB SERPL-MCNC: 0.5 MG/DL — SIGNIFICANT CHANGE UP (ref 0.2–1.2)
BUN SERPL-MCNC: 15 MG/DL — SIGNIFICANT CHANGE UP (ref 7–23)
CALCIUM SERPL-MCNC: 9 MG/DL — SIGNIFICANT CHANGE UP (ref 8.4–10.5)
CHLORIDE SERPL-SCNC: 101 MMOL/L — SIGNIFICANT CHANGE UP (ref 96–108)
CO2 SERPL-SCNC: 24 MMOL/L — SIGNIFICANT CHANGE UP (ref 22–31)
CREAT SERPL-MCNC: 0.48 MG/DL — LOW (ref 0.5–1.3)
CRP SERPL-MCNC: 4.31 MG/DL — HIGH (ref 0–0.4)
CULTURE RESULTS: NO GROWTH — SIGNIFICANT CHANGE UP
CULTURE RESULTS: NO GROWTH — SIGNIFICANT CHANGE UP
D DIMER BLD IA.RAPID-MCNC: 2084 NG/ML DDU — HIGH
EOSINOPHIL # BLD AUTO: 0 K/UL — SIGNIFICANT CHANGE UP (ref 0–0.5)
EOSINOPHIL NFR BLD AUTO: 0 % — SIGNIFICANT CHANGE UP (ref 0–6)
FERRITIN SERPL-MCNC: 1412 NG/ML — HIGH (ref 30–400)
GAMMA INTERFERON BACKGROUND BLD IA-ACNC: 0.01 IU/ML — SIGNIFICANT CHANGE UP
GLUCOSE BLDC GLUCOMTR-MCNC: 143 MG/DL — HIGH (ref 70–99)
GLUCOSE BLDC GLUCOMTR-MCNC: 188 MG/DL — HIGH (ref 70–99)
GLUCOSE BLDC GLUCOMTR-MCNC: 210 MG/DL — HIGH (ref 70–99)
GLUCOSE BLDC GLUCOMTR-MCNC: 212 MG/DL — HIGH (ref 70–99)
GLUCOSE SERPL-MCNC: 204 MG/DL — HIGH (ref 70–99)
HCT VFR BLD CALC: 43.2 % — SIGNIFICANT CHANGE UP (ref 39–50)
HGB BLD-MCNC: 14.5 G/DL — SIGNIFICANT CHANGE UP (ref 13–17)
IMM GRANULOCYTES NFR BLD AUTO: 0.5 % — SIGNIFICANT CHANGE UP (ref 0–1.5)
LYMPHOCYTES # BLD AUTO: 0.5 K/UL — LOW (ref 1–3.3)
LYMPHOCYTES # BLD AUTO: 8.6 % — LOW (ref 13–44)
M TB IFN-G BLD-IMP: NEGATIVE — SIGNIFICANT CHANGE UP
M TB IFN-G CD4+ BCKGRND COR BLD-ACNC: 0.01 IU/ML — SIGNIFICANT CHANGE UP
M TB IFN-G CD4+CD8+ BCKGRND COR BLD-ACNC: 0 IU/ML — SIGNIFICANT CHANGE UP
MAGNESIUM SERPL-MCNC: 2.4 MG/DL — SIGNIFICANT CHANGE UP (ref 1.6–2.6)
MCHC RBC-ENTMCNC: 30.9 PG — SIGNIFICANT CHANGE UP (ref 27–34)
MCHC RBC-ENTMCNC: 33.6 GM/DL — SIGNIFICANT CHANGE UP (ref 32–36)
MCV RBC AUTO: 92.1 FL — SIGNIFICANT CHANGE UP (ref 80–100)
MONOCYTES # BLD AUTO: 0.28 K/UL — SIGNIFICANT CHANGE UP (ref 0–0.9)
MONOCYTES NFR BLD AUTO: 4.8 % — SIGNIFICANT CHANGE UP (ref 2–14)
NEUTROPHILS # BLD AUTO: 5.02 K/UL — SIGNIFICANT CHANGE UP (ref 1.8–7.4)
NEUTROPHILS NFR BLD AUTO: 85.9 % — HIGH (ref 43–77)
NRBC # BLD: 0 /100 WBCS — SIGNIFICANT CHANGE UP (ref 0–0)
PHOSPHATE SERPL-MCNC: 2.7 MG/DL — SIGNIFICANT CHANGE UP (ref 2.5–4.5)
PLATELET # BLD AUTO: 389 K/UL — SIGNIFICANT CHANGE UP (ref 150–400)
POTASSIUM SERPL-MCNC: 4.4 MMOL/L — SIGNIFICANT CHANGE UP (ref 3.5–5.3)
POTASSIUM SERPL-SCNC: 4.4 MMOL/L — SIGNIFICANT CHANGE UP (ref 3.5–5.3)
PROCALCITONIN SERPL-MCNC: 0.04 NG/ML — SIGNIFICANT CHANGE UP (ref 0.02–0.1)
PROT SERPL-MCNC: 6.4 G/DL — SIGNIFICANT CHANGE UP (ref 6–8.3)
QUANT TB PLUS MITOGEN MINUS NIL: 0.83 IU/ML — SIGNIFICANT CHANGE UP
RBC # BLD: 4.69 M/UL — SIGNIFICANT CHANGE UP (ref 4.2–5.8)
RBC # FLD: 12.6 % — SIGNIFICANT CHANGE UP (ref 10.3–14.5)
SODIUM SERPL-SCNC: 136 MMOL/L — SIGNIFICANT CHANGE UP (ref 135–145)
SPECIMEN SOURCE: SIGNIFICANT CHANGE UP
SPECIMEN SOURCE: SIGNIFICANT CHANGE UP
WBC # BLD: 5.84 K/UL — SIGNIFICANT CHANGE UP (ref 3.8–10.5)
WBC # FLD AUTO: 5.84 K/UL — SIGNIFICANT CHANGE UP (ref 3.8–10.5)

## 2020-04-08 RX ORDER — INSULIN GLARGINE 100 [IU]/ML
35 INJECTION, SOLUTION SUBCUTANEOUS AT BEDTIME
Refills: 0 | Status: DISCONTINUED | OUTPATIENT
Start: 2020-04-08 | End: 2020-04-10

## 2020-04-08 RX ADMIN — AZITHROMYCIN 250 MILLIGRAM(S): 500 TABLET, FILM COATED ORAL at 21:20

## 2020-04-08 RX ADMIN — Medication 40 MILLIGRAM(S): at 06:31

## 2020-04-08 RX ADMIN — Medication 40 MILLIGRAM(S): at 19:19

## 2020-04-08 RX ADMIN — Medication 14 UNIT(S): at 12:31

## 2020-04-08 RX ADMIN — Medication 14 UNIT(S): at 08:00

## 2020-04-08 RX ADMIN — Medication 650 MILLIGRAM(S): at 19:18

## 2020-04-08 RX ADMIN — ZOLPIDEM TARTRATE 5 MILLIGRAM(S): 10 TABLET ORAL at 23:37

## 2020-04-08 RX ADMIN — Medication 650 MILLIGRAM(S): at 22:13

## 2020-04-08 RX ADMIN — Medication 2: at 07:07

## 2020-04-08 RX ADMIN — ENOXAPARIN SODIUM 70 MILLIGRAM(S): 100 INJECTION SUBCUTANEOUS at 06:33

## 2020-04-08 RX ADMIN — Medication 200 MILLIGRAM(S): at 06:33

## 2020-04-08 RX ADMIN — INSULIN GLARGINE 35 UNIT(S): 100 INJECTION, SOLUTION SUBCUTANEOUS at 23:04

## 2020-04-08 RX ADMIN — ENOXAPARIN SODIUM 70 MILLIGRAM(S): 100 INJECTION SUBCUTANEOUS at 19:18

## 2020-04-08 RX ADMIN — FAMOTIDINE 20 MILLIGRAM(S): 10 INJECTION INTRAVENOUS at 12:30

## 2020-04-08 RX ADMIN — Medication 14 UNIT(S): at 16:13

## 2020-04-08 RX ADMIN — Medication 650 MILLIGRAM(S): at 12:30

## 2020-04-08 RX ADMIN — ATORVASTATIN CALCIUM 40 MILLIGRAM(S): 80 TABLET, FILM COATED ORAL at 21:18

## 2020-04-08 RX ADMIN — Medication 4: at 16:13

## 2020-04-08 RX ADMIN — Medication 650 MILLIGRAM(S): at 06:32

## 2020-04-08 RX ADMIN — Medication 200 MILLIGRAM(S): at 19:18

## 2020-04-08 RX ADMIN — Medication 4: at 21:18

## 2020-04-08 RX ADMIN — LISINOPRIL 10 MILLIGRAM(S): 2.5 TABLET ORAL at 06:33

## 2020-04-08 NOTE — PROGRESS NOTE ADULT - SUBJECTIVE AND OBJECTIVE BOX
INTERVAL EVENTS: No acute events overnight, patient resting comfortably.         MEDICATIONS  (STANDING):  acetaminophen   Tablet .. 650 milliGRAM(s) Oral every 6 hours  atorvastatin 40 milliGRAM(s) Oral at bedtime  azithromycin   Tablet 250 milliGRAM(s) Oral every 24 hours  dextrose 5%. 1000 milliLiter(s) (50 mL/Hr) IV Continuous <Continuous>  dextrose 50% Injectable 12.5 Gram(s) IV Push once  enoxaparin Injectable 70 milliGRAM(s) SubCutaneous every 12 hours  famotidine    Tablet 20 milliGRAM(s) Oral daily  hydroxychloroquine 200 milliGRAM(s) Oral every 12 hours  insulin glargine Injectable (LANTUS) 30 Unit(s) SubCutaneous at bedtime  insulin lispro (HumaLOG) corrective regimen sliding scale   SubCutaneous Before meals and at bedtime  insulin lispro Injectable (HumaLOG) 14 Unit(s) SubCutaneous three times a day before meals  lisinopril 10 milliGRAM(s) Oral daily  methylPREDNISolone sodium succinate Injectable 40 milliGRAM(s) IV Push every 12 hours  propranolol 20 milliGRAM(s) Oral three times a day    MEDICATIONS  (PRN):  acetaminophen   Tablet .. 325 milliGRAM(s) Oral every 4 hours PRN Temp greater or equal to 38C (100.4F)  ALBUTerol    90 MICROgram(s) HFA Inhaler 2 Puff(s) Inhalation every 6 hours PRN Shortness of Breath  dextrose 40% Gel 15 Gram(s) Oral once PRN Blood Glucose LESS THAN 70 milliGRAM(s)/deciliter  glucagon  Injectable 1 milliGRAM(s) IntraMuscular once PRN Glucose LESS THAN 70 milligrams/deciliter  zolpidem 5 milliGRAM(s) Oral at bedtime PRN Insomnia      Vital Signs Last 24 Hrs  T(C): 35.6 (08 Apr 2020 09:40), Max: 37.1 (08 Apr 2020 09:24)  T(F): 96 (08 Apr 2020 09:40), Max: 98.7 (08 Apr 2020 09:24)  HR: 68 (08 Apr 2020 09:24) (67 - 68)  BP: 104/73 (08 Apr 2020 09:24) (104/73 - 120/71)  BP(mean): 85 (08 Apr 2020 09:24) (84 - 89)  RR: 23 (08 Apr 2020 09:24) (18 - 24)  SpO2: 94% (08 Apr 2020 09:24) (93% - 95%)     PHYSICAL EXAM:  General: A&Ox 3; NAD  Head: NC/AT;   Eyes: PERRL; EOMI; anicteric sclera  Neck: Supple; no JVD  Respiratory: CTA B/L; no wheezes/crackles/rales auscultated w/ good air movement  Cardiovascular: Regular rhythm/rate; S1/S2; no gallops or murmurs auscultated  Gastrointestinal: Soft; NTND w/out rebound tenderness or guarding;   Extremities: WWP; no edema or cyanosis;     LABS:                        14.5   5.84  )-----------( 389      ( 08 Apr 2020 06:06 )             43.2     04-08    136  |  101  |  15  ----------------------------<  204<H>  4.4   |  24  |  0.48<L>    Ca    9.0      08 Apr 2020 06:06  Phos  2.7     04-08  Mg     2.4     04-08    TPro  6.4  /  Alb  3.2<L>  /  TBili  0.5  /  DBili  x   /  AST  21  /  ALT  26  /  AlkPhos  87  04-08            I&O's Summary    07 Apr 2020 07:01  -  08 Apr 2020 07:00  --------------------------------------------------------  IN: 0 mL / OUT: 850 mL / NET: -850 mL      BNP  RADIOLOGY & ADDITIONAL STUDIES:    TELEMETRY:    EKG:

## 2020-04-08 NOTE — PROGRESS NOTE ADULT - SUBJECTIVE AND OBJECTIVE BOX
INTERVAL HPI/OVERNIGHT EVENTS:    Patient is a 60y old  Male who presents with a chief complaint of COVID (08 Apr 2020 11:30)      Pt reports the following symptoms:    CONSTITUTIONAL:  Negative fever or chills, feels well, good appetite  EYES:  Negative  blurry vision or double vision  CARDIOVASCULAR:  Negative for chest pain or palpitations  RESPIRATORY:  Negative for cough, wheezing, or SOB   GASTROINTESTINAL:  Negative for nausea, vomiting, diarrhea, constipation, or abdominal pain  GENITOURINARY:  Negative frequency, urgency or dysuria  NEUROLOGIC:  No headache, confusion, dizziness, lightheadedness    MEDICATIONS  (STANDING):  acetaminophen   Tablet .. 650 milliGRAM(s) Oral every 6 hours  atorvastatin 40 milliGRAM(s) Oral at bedtime  azithromycin   Tablet 250 milliGRAM(s) Oral every 24 hours  dextrose 5%. 1000 milliLiter(s) (50 mL/Hr) IV Continuous <Continuous>  dextrose 50% Injectable 12.5 Gram(s) IV Push once  enoxaparin Injectable 70 milliGRAM(s) SubCutaneous every 12 hours  famotidine    Tablet 20 milliGRAM(s) Oral daily  hydroxychloroquine 200 milliGRAM(s) Oral every 12 hours  insulin glargine Injectable (LANTUS) 30 Unit(s) SubCutaneous at bedtime  insulin lispro (HumaLOG) corrective regimen sliding scale   SubCutaneous Before meals and at bedtime  insulin lispro Injectable (HumaLOG) 14 Unit(s) SubCutaneous three times a day before meals  lisinopril 10 milliGRAM(s) Oral daily  methylPREDNISolone sodium succinate Injectable 40 milliGRAM(s) IV Push every 12 hours  propranolol 20 milliGRAM(s) Oral three times a day    MEDICATIONS  (PRN):  acetaminophen   Tablet .. 325 milliGRAM(s) Oral every 4 hours PRN Temp greater or equal to 38C (100.4F)  ALBUTerol    90 MICROgram(s) HFA Inhaler 2 Puff(s) Inhalation every 6 hours PRN Shortness of Breath  dextrose 40% Gel 15 Gram(s) Oral once PRN Blood Glucose LESS THAN 70 milliGRAM(s)/deciliter  glucagon  Injectable 1 milliGRAM(s) IntraMuscular once PRN Glucose LESS THAN 70 milligrams/deciliter  zolpidem 5 milliGRAM(s) Oral at bedtime PRN Insomnia      PHYSICAL EXAM  Vital Signs Last 24 Hrs  T(C): 35.6 (08 Apr 2020 09:40), Max: 37.1 (08 Apr 2020 09:24)  T(F): 96 (08 Apr 2020 09:40), Max: 98.7 (08 Apr 2020 09:24)  HR: 81 (08 Apr 2020 12:54) (67 - 81)  BP: 104/73 (08 Apr 2020 09:24) (104/73 - 120/71)  BP(mean): 85 (08 Apr 2020 09:24) (84 - 89)  RR: 23 (08 Apr 2020 12:54) (18 - 24)  SpO2: 77% (08 Apr 2020 12:54) (77% - 95%)    Constitutional: wn/wd in NAD.   HEENT: NCAT, MMM, OP clear, EOMI, no proptosis or lid retraction  Neck: no thyromegaly or palpable thyroid nodules   Respiratory: lungs CTAB.  Cardiovascular: regular rhythm, normal S1 and S2, no audible murmurs, no peripheral edema  GI: soft, NT/ND, no masses/HSM appreciated.  Neurology: no tremors, DTR 2+  Skin: no visible rashes/lesions  Psychiatric: AAO x 3, normal affect/mood.    LABS:                        14.5   5.84  )-----------( 389      ( 08 Apr 2020 06:06 )             43.2     04-08    136  |  101  |  15  ----------------------------<  204<H>  4.4   |  24  |  0.48<L>    Ca    9.0      08 Apr 2020 06:06  Phos  2.7     04-08  Mg     2.4     04-08    TPro  6.4  /  Alb  3.2<L>  /  TBili  0.5  /  DBili  x   /  AST  21  /  ALT  26  /  AlkPhos  87  04-08            HbA1C: 10.0 % (04-04 @ 10:24)    CAPILLARY BLOOD GLUCOSE      POCT Blood Glucose.: 143 mg/dL (08 Apr 2020 11:56)  POCT Blood Glucose.: 188 mg/dL (08 Apr 2020 06:50)  POCT Blood Glucose.: 222 mg/dL (07 Apr 2020 23:05)  POCT Blood Glucose.: 191 mg/dL (07 Apr 2020 20:54)  POCT Blood Glucose.: 224 mg/dL (07 Apr 2020 17:36)      Insulin Sliding Scale requirements X 24 Hours:    RADIOLOGY & ADDITIONAL TESTS:    A/P: 60y Male with history of DM type II presenting for       1.  DM -     Please continue           units lantus at bedtime  / in the morning and        units lispro with meals and lispro moderate / low dose sliding scale 4 times daily with meals and at bedtime.  Please continue consistent carbohydrate diet.      Goal FSG is   Will continue to monitor   For discharge, pt can continue    Pt can follow up at discharge with [facility] by calling  to make an appointment.   Will discuss case with     and update primary team INTERVAL HPI/OVERNIGHT EVENTS:    Patient is a 60y old  Male who presents with a chief complaint of COVID (08 Apr 2020 11:30)  Spoke to the primary team and the nurse taking care of the patient. He is currently on ventimask. His appetite has been okay. His c-peptide was 1.6.  he is currently on solumedrol 40mg Iv Q12. His FSG and insulin administration reviewed.      Pt reports the following symptoms:  CONSTITUTIONAL:  Negative fever or chills  CARDIOVASCULAR:  Negative for chest pain or palpitations  RESPIRATORY:  Negative for cough, wheezing  GASTROINTESTINAL:  Negative for diarrhea, constipation, or abdominal pain  NEUROLOGIC:  No headache, confusion, dizziness, lightheadedness      MEDICATIONS  (STANDING):  acetaminophen   Tablet .. 650 milliGRAM(s) Oral every 6 hours  atorvastatin 40 milliGRAM(s) Oral at bedtime  azithromycin   Tablet 250 milliGRAM(s) Oral every 24 hours  dextrose 5%. 1000 milliLiter(s) (50 mL/Hr) IV Continuous <Continuous>  dextrose 50% Injectable 12.5 Gram(s) IV Push once  enoxaparin Injectable 70 milliGRAM(s) SubCutaneous every 12 hours  famotidine    Tablet 20 milliGRAM(s) Oral daily  hydroxychloroquine 200 milliGRAM(s) Oral every 12 hours  insulin glargine Injectable (LANTUS) 30 Unit(s) SubCutaneous at bedtime  insulin lispro (HumaLOG) corrective regimen sliding scale   SubCutaneous Before meals and at bedtime  insulin lispro Injectable (HumaLOG) 14 Unit(s) SubCutaneous three times a day before meals  lisinopril 10 milliGRAM(s) Oral daily  methylPREDNISolone sodium succinate Injectable 40 milliGRAM(s) IV Push every 12 hours  propranolol 20 milliGRAM(s) Oral three times a day    MEDICATIONS  (PRN):  acetaminophen   Tablet .. 325 milliGRAM(s) Oral every 4 hours PRN Temp greater or equal to 38C (100.4F)  ALBUTerol    90 MICROgram(s) HFA Inhaler 2 Puff(s) Inhalation every 6 hours PRN Shortness of Breath  dextrose 40% Gel 15 Gram(s) Oral once PRN Blood Glucose LESS THAN 70 milliGRAM(s)/deciliter  glucagon  Injectable 1 milliGRAM(s) IntraMuscular once PRN Glucose LESS THAN 70 milligrams/deciliter  zolpidem 5 milliGRAM(s) Oral at bedtime PRN Insomnia      PHYSICAL EXAM  Vital Signs Last 24 Hrs  T(C): 35.6 (08 Apr 2020 09:40), Max: 37.1 (08 Apr 2020 09:24)  T(F): 96 (08 Apr 2020 09:40), Max: 98.7 (08 Apr 2020 09:24)  HR: 81 (08 Apr 2020 12:54) (67 - 81)  BP: 104/73 (08 Apr 2020 09:24) (104/73 - 120/71)  BP(mean): 85 (08 Apr 2020 09:24) (84 - 89)  RR: 23 (08 Apr 2020 12:54) (18 - 24)  SpO2: 77% (08 Apr 2020 12:54) (77% - 95%)    Due to the nature of this patient’s COVID-19 isolation status (either confirmed or suspected), this note was prepared without a bedside physical examination to prevent spread of infection and to conserve personal protective equipment during this nationwide pandemic. If possible, direct patient communication occurred via electronic measures or telephone conversation. Examination highlights were provided by a bedside nurse wearing personal protective equipment and review of pertinent medical records. Objective data (vital signs, urine output, lab results, imaging studies, medications, etc) were reviewed in detail. Face to face visits and physical examination have been limited only to patients for whom it is required for medical decision making.    LABS:                        14.5   5.84  )-----------( 389      ( 08 Apr 2020 06:06 )             43.2     04-08    136  |  101  |  15  ----------------------------<  204<H>  4.4   |  24  |  0.48<L>    Ca    9.0      08 Apr 2020 06:06  Phos  2.7     04-08  Mg     2.4     04-08    TPro  6.4  /  Alb  3.2<L>  /  TBili  0.5  /  DBili  x   /  AST  21  /  ALT  26  /  AlkPhos  87  04-08            HbA1C: 10.0 % (04-04 @ 10:24)    CAPILLARY BLOOD GLUCOSE      POCT Blood Glucose.: 143 mg/dL (08 Apr 2020 11:56)  POCT Blood Glucose.: 188 mg/dL (08 Apr 2020 06:50)  POCT Blood Glucose.: 222 mg/dL (07 Apr 2020 23:05)  POCT Blood Glucose.: 191 mg/dL (07 Apr 2020 20:54)  POCT Blood Glucose.: 224 mg/dL (07 Apr 2020 17:36)      Insulin Sliding Scale requirements X 24 Hours:    RADIOLOGY & ADDITIONAL TESTS:    A/P: 59yo M with pmh of type 2 dm, htn, and mild seasonal asthma found to be covid positive. presented with DKA with elevated BHB, ketosis, elevated anion gap. Now, the DKA has resolved.     1.  DM 2, poorly controlled, HbA1c 10% - with complications, DKA resolved and steroid induced hyperglycemia   - hba1c 10  Cr 0.46  - Please increase to lantus 35 units at bedtime today   please continue lispro 14 units before each meal  Please continue lispro moderate sliding scale before meals and bedtime.  - carb consistent diet  - on solumedrol 40mg BID.   Pt's fingerstick glucose goal is 140-180   Will continue to monitor     2. hyperlipidemia  - LDL goal < 70  - Please continue lipitor 40 mg once daily    For discharge, TBD      Case discussed with Dr. Angelo and primary team updated.

## 2020-04-08 NOTE — PROGRESS NOTE ADULT - PROBLEM SELECTOR PLAN 3
Patient with known history of type 2 diabetes. On metformin, jardiance, and januvia at home  - Found to be in DKA on the floors, since resolved   - C/W basal bolus, being followed by endocrine

## 2020-04-08 NOTE — PROGRESS NOTE ADULT - PROBLEM SELECTOR PLAN 1
Pt currently on venti (50%) saturating 90-95%.   - Isolation precautions; contact and airborne  - trend COVID-19 labs  - Monitor O2 saturation, monitor for respiratory distress  - albuterol MDI; avoid duonebs  - c/w hydroxychloroquine 400mg IV BID x2 doses, then 200mg BID for 4 days (4/5-  - c/w azithromycin 250mg QD (4/5-  - tylenol standing 675mg PO given pt's inflammatory markers, with 325mg q6 hours prn for fevers despite standing Tylenol. Can d/c standing Tylenol if pt continues to be afebrile   - started on solumedrol 40mg IV bid (4/4 - ) and tocilizumab [id number]

## 2020-04-08 NOTE — PROGRESS NOTE ADULT - PROBLEM SELECTOR PLAN 2
#Increased anion gap --> NOW RESOLVED  Patient with AG of 27 with ketones in urine. Lactate 2.0. No history of renal failure. Likely from DKA -- > now resolved  - betahydroxybutyrate 4.8  -Monitor bmp

## 2020-04-08 NOTE — PROGRESS NOTE ADULT - ASSESSMENT
Patient is a 61yo M with pmh of type 2 dm, htn, and mild seasonal asthma (never hospitalized for it, occasionally prescribed inhalers) presenting with one week of experiencing fevers/chills, dry cough, and generalized weakness, with shortness of breath over the last several days, found to be hypoxic to 82% on RA, now stable on venti mask.

## 2020-04-08 NOTE — PROGRESS NOTE ADULT - ATTENDING COMMENTS
Agree with above  Pt remains on venti mask  remains on steroids  insulin administration reviewed  no acute overnight events  Due to the nature of this patient’s COVID-19 isolation status (either confirmed or suspected), this note was prepared without a bedside physical examination to prevent spread of infection and to conserve personal protective equipment during this nationwide pandemic. Objective data (vital signs, urine output, lab results, imaging studies, medications, etc) were reviewed in detail. Physical examinations have been limited only to patients for whom it is required for medical decision making.    Can increase lantus to 35 units at bedtime, conitnue pre-meal 14 units once daily,   continue atorvastatin 40mg daily  will follow

## 2020-04-09 DIAGNOSIS — E11.10 TYPE 2 DIABETES MELLITUS WITH KETOACIDOSIS WITHOUT COMA: ICD-10-CM

## 2020-04-09 LAB
ALBUMIN SERPL ELPH-MCNC: 3 G/DL — LOW (ref 3.3–5)
ALP SERPL-CCNC: 76 U/L — SIGNIFICANT CHANGE UP (ref 40–120)
ALT FLD-CCNC: 26 U/L — SIGNIFICANT CHANGE UP (ref 10–45)
ANION GAP SERPL CALC-SCNC: 11 MMOL/L — SIGNIFICANT CHANGE UP (ref 5–17)
AST SERPL-CCNC: 26 U/L — SIGNIFICANT CHANGE UP (ref 10–40)
BASOPHILS # BLD AUTO: 0 K/UL — SIGNIFICANT CHANGE UP (ref 0–0.2)
BASOPHILS NFR BLD AUTO: 0 % — SIGNIFICANT CHANGE UP (ref 0–2)
BILIRUB SERPL-MCNC: 0.6 MG/DL — SIGNIFICANT CHANGE UP (ref 0.2–1.2)
BUN SERPL-MCNC: 13 MG/DL — SIGNIFICANT CHANGE UP (ref 7–23)
CALCIUM SERPL-MCNC: 8.9 MG/DL — SIGNIFICANT CHANGE UP (ref 8.4–10.5)
CHLORIDE SERPL-SCNC: 102 MMOL/L — SIGNIFICANT CHANGE UP (ref 96–108)
CO2 SERPL-SCNC: 23 MMOL/L — SIGNIFICANT CHANGE UP (ref 22–31)
CREAT SERPL-MCNC: 0.44 MG/DL — LOW (ref 0.5–1.3)
CRP SERPL-MCNC: 2.07 MG/DL — HIGH (ref 0–0.4)
D DIMER BLD IA.RAPID-MCNC: 2052 NG/ML DDU — HIGH
EOSINOPHIL # BLD AUTO: 0.02 K/UL — SIGNIFICANT CHANGE UP (ref 0–0.5)
EOSINOPHIL NFR BLD AUTO: 0.3 % — SIGNIFICANT CHANGE UP (ref 0–6)
FERRITIN SERPL-MCNC: 1403 NG/ML — HIGH (ref 30–400)
GLUCOSE BLDC GLUCOMTR-MCNC: 125 MG/DL — HIGH (ref 70–99)
GLUCOSE BLDC GLUCOMTR-MCNC: 138 MG/DL — HIGH (ref 70–99)
GLUCOSE BLDC GLUCOMTR-MCNC: 140 MG/DL — HIGH (ref 70–99)
GLUCOSE BLDC GLUCOMTR-MCNC: 173 MG/DL — HIGH (ref 70–99)
GLUCOSE BLDC GLUCOMTR-MCNC: 178 MG/DL — HIGH (ref 70–99)
GLUCOSE SERPL-MCNC: 155 MG/DL — HIGH (ref 70–99)
HCT VFR BLD CALC: 44.1 % — SIGNIFICANT CHANGE UP (ref 39–50)
HGB BLD-MCNC: 14.9 G/DL — SIGNIFICANT CHANGE UP (ref 13–17)
IMM GRANULOCYTES NFR BLD AUTO: 0.9 % — SIGNIFICANT CHANGE UP (ref 0–1.5)
LYMPHOCYTES # BLD AUTO: 0.68 K/UL — LOW (ref 1–3.3)
LYMPHOCYTES # BLD AUTO: 11.6 % — LOW (ref 13–44)
MAGNESIUM SERPL-MCNC: 2.2 MG/DL — SIGNIFICANT CHANGE UP (ref 1.6–2.6)
MCHC RBC-ENTMCNC: 30.9 PG — SIGNIFICANT CHANGE UP (ref 27–34)
MCHC RBC-ENTMCNC: 33.8 GM/DL — SIGNIFICANT CHANGE UP (ref 32–36)
MCV RBC AUTO: 91.5 FL — SIGNIFICANT CHANGE UP (ref 80–100)
MONOCYTES # BLD AUTO: 0.31 K/UL — SIGNIFICANT CHANGE UP (ref 0–0.9)
MONOCYTES NFR BLD AUTO: 5.3 % — SIGNIFICANT CHANGE UP (ref 2–14)
NEUTROPHILS # BLD AUTO: 4.79 K/UL — SIGNIFICANT CHANGE UP (ref 1.8–7.4)
NEUTROPHILS NFR BLD AUTO: 81.9 % — HIGH (ref 43–77)
NRBC # BLD: 0 /100 WBCS — SIGNIFICANT CHANGE UP (ref 0–0)
PHOSPHATE SERPL-MCNC: 3 MG/DL — SIGNIFICANT CHANGE UP (ref 2.5–4.5)
PLATELET # BLD AUTO: 444 K/UL — HIGH (ref 150–400)
POTASSIUM SERPL-MCNC: 4.7 MMOL/L — SIGNIFICANT CHANGE UP (ref 3.5–5.3)
POTASSIUM SERPL-SCNC: 4.7 MMOL/L — SIGNIFICANT CHANGE UP (ref 3.5–5.3)
PROT SERPL-MCNC: 6.3 G/DL — SIGNIFICANT CHANGE UP (ref 6–8.3)
RBC # BLD: 4.82 M/UL — SIGNIFICANT CHANGE UP (ref 4.2–5.8)
RBC # FLD: 12.3 % — SIGNIFICANT CHANGE UP (ref 10.3–14.5)
SODIUM SERPL-SCNC: 136 MMOL/L — SIGNIFICANT CHANGE UP (ref 135–145)
WBC # BLD: 5.85 K/UL — SIGNIFICANT CHANGE UP (ref 3.8–10.5)
WBC # FLD AUTO: 5.85 K/UL — SIGNIFICANT CHANGE UP (ref 3.8–10.5)

## 2020-04-09 RX ADMIN — FAMOTIDINE 20 MILLIGRAM(S): 10 INJECTION INTRAVENOUS at 12:24

## 2020-04-09 RX ADMIN — ATORVASTATIN CALCIUM 40 MILLIGRAM(S): 80 TABLET, FILM COATED ORAL at 22:15

## 2020-04-09 RX ADMIN — Medication 14 UNIT(S): at 12:22

## 2020-04-09 RX ADMIN — ENOXAPARIN SODIUM 70 MILLIGRAM(S): 100 INJECTION SUBCUTANEOUS at 17:02

## 2020-04-09 RX ADMIN — Medication 40 MILLIGRAM(S): at 05:52

## 2020-04-09 RX ADMIN — ENOXAPARIN SODIUM 70 MILLIGRAM(S): 100 INJECTION SUBCUTANEOUS at 05:52

## 2020-04-09 RX ADMIN — Medication 650 MILLIGRAM(S): at 05:52

## 2020-04-09 RX ADMIN — Medication 14 UNIT(S): at 16:58

## 2020-04-09 RX ADMIN — Medication 650 MILLIGRAM(S): at 12:25

## 2020-04-09 RX ADMIN — LISINOPRIL 10 MILLIGRAM(S): 2.5 TABLET ORAL at 05:52

## 2020-04-09 RX ADMIN — INSULIN GLARGINE 35 UNIT(S): 100 INJECTION, SOLUTION SUBCUTANEOUS at 22:15

## 2020-04-09 RX ADMIN — Medication 650 MILLIGRAM(S): at 22:16

## 2020-04-09 RX ADMIN — Medication 2: at 12:23

## 2020-04-09 RX ADMIN — Medication 650 MILLIGRAM(S): at 17:01

## 2020-04-09 RX ADMIN — Medication 14 UNIT(S): at 08:32

## 2020-04-09 NOTE — PROGRESS NOTE ADULT - ASSESSMENT
Patient is a 61yo M with pmh of type 2 dm, htn, and mild seasonal asthma (never hospitalized for it, occasionally prescribed inhalers) presenting with one week of experiencing fevers/chills, dry cough, and generalized weakness, with shortness of breath over the last several days, found to be hypoxic to 82% on RA, now stable on venti mask. Patient is a 61yo M with pmh of type 2 dm, htn, and mild seasonal asthma (never hospitalized for it, occasionally prescribed inhalers) presenting with one week of experiencing fevers/chills, dry cough, and generalized weakness, with shortness of breath over the last several days, found to be hypoxic to 82% on RA, now stable on venti mask, attempting to wean to NC. Patient is a 59yo M with pmh of type 2 dm, htn, and mild seasonal asthma (never hospitalized for it, occasionally prescribed inhalers) presenting with one week of experiencing fevers/chills, dry cough, and generalized weakness, with shortness of breath over the last several days, found to be hypoxic to 82% on RA and course c/b DKA requiring tele admission. DKA now resolved and patient stable on NC, weaning to RMF.

## 2020-04-09 NOTE — PROGRESS NOTE ADULT - PROBLEM SELECTOR PLAN 3
Patient with known history of type 2 diabetes. On metformin, jardiance, and januvia at home  - Found to be in DKA on the floors, since resolved   - C/W basal bolus, being followed by endocrine Patient with known history of type 2 diabetes. On metformin, jardiance, and januvia at home  - Found to be in DKA on the floors, since resolved   - C/W basal bolus, being followed by endocrine closely as patient now s/p steroids

## 2020-04-09 NOTE — PROGRESS NOTE ADULT - PROBLEM SELECTOR PLAN 5
Pt with history of seasonal asthma, only requiring occasional albuterol inhaler use. Never intubated or hospitalized for asthma  -Prn albuterol inhaler Daily Assessment

## 2020-04-09 NOTE — PROGRESS NOTE ADULT - SUBJECTIVE AND OBJECTIVE BOX
INTERVAL HPI/OVERNIGHT EVENTS:      On further ROS, patient did not have complaint of: Headache, Blurred Vision, Cough, Dyspnea, Palpitations, Abdominal Pain, N/V, Weakness, Change in Sensation.     VITAL SIGNS:  T(F): 96.9 (04-09-20 @ 05:10)  HR: 67 (04-09-20 @ 05:39)  BP: 118/68 (04-09-20 @ 05:39)  RR: 18 (04-09-20 @ 05:39)  SpO2: 95% (04-09-20 @ 05:39)  Wt(kg): --    Input & Output:    04-07-20 @ 07:01  -  04-08-20 @ 07:00  --------------------------------------------------------  IN: 0 mL / OUT: 850 mL / NET: -850 mL    04-08-20 @ 07:01  -  04-09-20 @ 06:32  --------------------------------------------------------  IN: 0 mL / OUT: 700 mL / NET: -700 mL        PHYSICAL EXAM:  Constitutional: Patient seated comfortably in bed, of appropriate color, nutrition, and hydration.   HEENT: PERRLA, Normal Range of eye movement with no complaint of diplopia, Normal Hearing  Neck: No LAD, No JVD  Back: Normal spine flexure, No CVA tenderness  Respiratory: Normal air entry, Lungs clear to auscultation b/l w/o wheeze or crepitations.   Cardiovascular: S1 and S2 present - no additional abnormal sounds or murmurs. Normal rhythm and rate of pulse.   Gastrointestinal: BS+, soft, NT/ND  Extremities: No peripheral edema  Vascular: 2+ peripheral pulses  Neurological: A/O x 3, CN V-XII grossly intact.  Psychiatric: Normal mood, normal affect  Musculoskeletal: 5/5 strength b/l upper and lower extremities  Skin: No rashes    MEDICATIONS  (STANDING):  acetaminophen   Tablet .. 650 milliGRAM(s) Oral every 6 hours  atorvastatin 40 milliGRAM(s) Oral at bedtime  dextrose 5%. 1000 milliLiter(s) (50 mL/Hr) IV Continuous <Continuous>  dextrose 50% Injectable 12.5 Gram(s) IV Push once  enoxaparin Injectable 70 milliGRAM(s) SubCutaneous every 12 hours  famotidine    Tablet 20 milliGRAM(s) Oral daily  insulin glargine Injectable (LANTUS) 35 Unit(s) SubCutaneous at bedtime  insulin lispro (HumaLOG) corrective regimen sliding scale   SubCutaneous Before meals and at bedtime  insulin lispro Injectable (HumaLOG) 14 Unit(s) SubCutaneous three times a day before meals  lisinopril 10 milliGRAM(s) Oral daily  methylPREDNISolone sodium succinate Injectable 40 milliGRAM(s) IV Push every 12 hours  propranolol 20 milliGRAM(s) Oral three times a day    MEDICATIONS  (PRN):  acetaminophen   Tablet .. 325 milliGRAM(s) Oral every 4 hours PRN Temp greater or equal to 38C (100.4F)  ALBUTerol    90 MICROgram(s) HFA Inhaler 2 Puff(s) Inhalation every 6 hours PRN Shortness of Breath  dextrose 40% Gel 15 Gram(s) Oral once PRN Blood Glucose LESS THAN 70 milliGRAM(s)/deciliter  glucagon  Injectable 1 milliGRAM(s) IntraMuscular once PRN Glucose LESS THAN 70 milligrams/deciliter  zolpidem 5 milliGRAM(s) Oral at bedtime PRN Insomnia      Allergies  No Known Allergies  Intolerances      LABS:                        14.5   5.84  )-----------( 389      ( 08 Apr 2020 06:06 )             43.2     04-08    136  |  101  |  15  ----------------------------<  204<H>  4.4   |  24  |  0.48<L>    Ca    9.0      08 Apr 2020 06:06  Phos  2.7     04-08  Mg     2.4     04-08    TPro  6.4  /  Alb  3.2<L>  /  TBili  0.5  /  DBili  x   /  AST  21  /  ALT  26  /  AlkPhos  87  04-08          RADIOLOGY & ADDITIONAL TESTS: INTERVAL HPI/OVERNIGHT EVENTS: No events overnight.       On further ROS, patient did not have complaint of: Headache, Blurred Vision, Cough, Dyspnea, Palpitations, Abdominal Pain, N/V, Weakness, Change in Sensation.     VITAL SIGNS:  T(F): 96.9 (04-09-20 @ 05:10)  HR: 67 (04-09-20 @ 05:39)  BP: 118/68 (04-09-20 @ 05:39)  RR: 18 (04-09-20 @ 05:39)  SpO2: 95% (04-09-20 @ 05:39)  Wt(kg): --    Input & Output:    04-07-20 @ 07:01  -  04-08-20 @ 07:00  --------------------------------------------------------  IN: 0 mL / OUT: 850 mL / NET: -850 mL    04-08-20 @ 07:01  -  04-09-20 @ 06:32  --------------------------------------------------------  IN: 0 mL / OUT: 700 mL / NET: -700 mL        PHYSICAL EXAM:  General: A&Ox 3; NAD  Head: NC/AT;   Eyes: PERRL; EOMI; anicteric sclera  Neck: Supple; no JVD  Respiratory: CTA B/L; no wheezes/crackles/rales auscultated w/ good air movement  Cardiovascular: Regular rhythm/rate; S1/S2; no gallops or murmurs auscultated  Gastrointestinal: Soft; NTND w/out rebound tenderness or guarding;   Extremities: WWP; no edema or cyanosis;     MEDICATIONS  (STANDING):  acetaminophen   Tablet .. 650 milliGRAM(s) Oral every 6 hours  atorvastatin 40 milliGRAM(s) Oral at bedtime  dextrose 5%. 1000 milliLiter(s) (50 mL/Hr) IV Continuous <Continuous>  dextrose 50% Injectable 12.5 Gram(s) IV Push once  enoxaparin Injectable 70 milliGRAM(s) SubCutaneous every 12 hours  famotidine    Tablet 20 milliGRAM(s) Oral daily  insulin glargine Injectable (LANTUS) 35 Unit(s) SubCutaneous at bedtime  insulin lispro (HumaLOG) corrective regimen sliding scale   SubCutaneous Before meals and at bedtime  insulin lispro Injectable (HumaLOG) 14 Unit(s) SubCutaneous three times a day before meals  lisinopril 10 milliGRAM(s) Oral daily  methylPREDNISolone sodium succinate Injectable 40 milliGRAM(s) IV Push every 12 hours  propranolol 20 milliGRAM(s) Oral three times a day    MEDICATIONS  (PRN):  acetaminophen   Tablet .. 325 milliGRAM(s) Oral every 4 hours PRN Temp greater or equal to 38C (100.4F)  ALBUTerol    90 MICROgram(s) HFA Inhaler 2 Puff(s) Inhalation every 6 hours PRN Shortness of Breath  dextrose 40% Gel 15 Gram(s) Oral once PRN Blood Glucose LESS THAN 70 milliGRAM(s)/deciliter  glucagon  Injectable 1 milliGRAM(s) IntraMuscular once PRN Glucose LESS THAN 70 milligrams/deciliter  zolpidem 5 milliGRAM(s) Oral at bedtime PRN Insomnia      Allergies  No Known Allergies  Intolerances      LABS:                        14.5   5.84  )-----------( 389      ( 08 Apr 2020 06:06 )             43.2     04-08    136  |  101  |  15  ----------------------------<  204<H>  4.4   |  24  |  0.48<L>    Ca    9.0      08 Apr 2020 06:06  Phos  2.7     04-08  Mg     2.4     04-08    TPro  6.4  /  Alb  3.2<L>  /  TBili  0.5  /  DBili  x   /  AST  21  /  ALT  26  /  AlkPhos  87  04-08          RADIOLOGY & ADDITIONAL TESTS: Transfer from [facility] to [facility]:  Patient is a 61yo M with pmh of type 2 dm, htn, and mild seasonal asthma (never hospitalized for it, occasionally prescribed inhalers) presenting on 4/4 with one week of experiencing fevers/chills, dry cough, and generalized weakness, with shortness of breath over the last several days. Originally admitted to [facility] for COVID but stepped up to tele in presence of DKA, requiring Lispro q2H (mimicking insulin gtt). Gap closed x2 on tele and patient is currently on Lantus 35U QHS and Lispro 14U TID with endocrine following. In terms of COVID, patient is s/p plaq/azithro, steroids, and toci. Patient weaned from NRB to venti to 6L NC saturating well. Stepped down to [facility] for further oxygen weaning as well as endocrine management.         INTERVAL HPI/OVERNIGHT EVENTS: No events overnight.       On further ROS, patient did not have complaint of: Headache, Blurred Vision, Cough, Dyspnea, Palpitations, Abdominal Pain, N/V, Weakness, Change in Sensation.     VITAL SIGNS:  T(F): 96.9 (04-09-20 @ 05:10)  HR: 67 (04-09-20 @ 05:39)  BP: 118/68 (04-09-20 @ 05:39)  RR: 18 (04-09-20 @ 05:39)  SpO2: 95% (04-09-20 @ 05:39)  Wt(kg): --    Input & Output:    04-07-20 @ 07:01  -  04-08-20 @ 07:00  --------------------------------------------------------  IN: 0 mL / OUT: 850 mL / NET: -850 mL    04-08-20 @ 07:01  -  04-09-20 @ 06:32  --------------------------------------------------------  IN: 0 mL / OUT: 700 mL / NET: -700 mL        PHYSICAL EXAM:  General: A&Ox 3; NAD  Head: NC/AT;   Eyes: PERRL; EOMI; anicteric sclera  Neck: Supple; no JVD  Respiratory: CTA B/L; no wheezes/crackles/rales auscultated w/ good air movement  Cardiovascular: Regular rhythm/rate; S1/S2; no gallops or murmurs auscultated  Gastrointestinal: Soft; NTND w/out rebound tenderness or guarding;   Extremities: WWP; no edema or cyanosis;     MEDICATIONS  (STANDING):  acetaminophen   Tablet .. 650 milliGRAM(s) Oral every 6 hours  atorvastatin 40 milliGRAM(s) Oral at bedtime  dextrose 5%. 1000 milliLiter(s) (50 mL/Hr) IV Continuous <Continuous>  dextrose 50% Injectable 12.5 Gram(s) IV Push once  enoxaparin Injectable 70 milliGRAM(s) SubCutaneous every 12 hours  famotidine    Tablet 20 milliGRAM(s) Oral daily  insulin glargine Injectable (LANTUS) 35 Unit(s) SubCutaneous at bedtime  insulin lispro (HumaLOG) corrective regimen sliding scale   SubCutaneous Before meals and at bedtime  insulin lispro Injectable (HumaLOG) 14 Unit(s) SubCutaneous three times a day before meals  lisinopril 10 milliGRAM(s) Oral daily  methylPREDNISolone sodium succinate Injectable 40 milliGRAM(s) IV Push every 12 hours  propranolol 20 milliGRAM(s) Oral three times a day    MEDICATIONS  (PRN):  acetaminophen   Tablet .. 325 milliGRAM(s) Oral every 4 hours PRN Temp greater or equal to 38C (100.4F)  ALBUTerol    90 MICROgram(s) HFA Inhaler 2 Puff(s) Inhalation every 6 hours PRN Shortness of Breath  dextrose 40% Gel 15 Gram(s) Oral once PRN Blood Glucose LESS THAN 70 milliGRAM(s)/deciliter  glucagon  Injectable 1 milliGRAM(s) IntraMuscular once PRN Glucose LESS THAN 70 milligrams/deciliter  zolpidem 5 milliGRAM(s) Oral at bedtime PRN Insomnia      Allergies  No Known Allergies  Intolerances      LABS:                        14.5   5.84  )-----------( 389      ( 08 Apr 2020 06:06 )             43.2     04-08    136  |  101  |  15  ----------------------------<  204<H>  4.4   |  24  |  0.48<L>    Ca    9.0      08 Apr 2020 06:06  Phos  2.7     04-08  Mg     2.4     04-08    TPro  6.4  /  Alb  3.2<L>  /  TBili  0.5  /  DBili  x   /  AST  21  /  ALT  26  /  AlkPhos  87  04-08          RADIOLOGY & ADDITIONAL TESTS:

## 2020-04-09 NOTE — PROGRESS NOTE ADULT - PROBLEM SELECTOR PLAN 1
Pt currently on venti (50%) saturating 90-95%.   - Isolation precautions; contact and airborne  - trend COVID-19 labs  - Monitor O2 saturation, monitor for respiratory distress  - albuterol MDI; avoid duonebs  - c/w hydroxychloroquine 400mg IV BID x2 doses, then 200mg BID for 4 days (4/5-  - c/w azithromycin 250mg QD (4/5-  - tylenol standing 675mg PO given pt's inflammatory markers, with 325mg q6 hours prn for fevers despite standing Tylenol. Can d/c standing Tylenol if pt continues to be afebrile   - started on solumedrol 40mg IV bid (4/4 - ) and tocilizumab [id number] Pt currently on venti (50%) saturating 90-95%, will wean to 6L NC today.   - Isolation precautions; contact and airborne  - trend COVID-19 labs  - Monitor O2 saturation, monitor for respiratory distress  - albuterol MDI; avoid duonebs  - c/w hydroxychloroquine 400mg IV BID x2 doses, then 200mg BID for 4 days (4/5-  - s/p azithromycin 250mg QD (4/5-4/9)  - tylenol standing 675mg PO given pt's inflammatory markers, with 325mg q6 hours prn for fevers despite standing Tylenol. Can d/c standing Tylenol if pt continues to be afebrile   - s/p solumedrol 40mg IV bid (4/4 -4/9 ) and tocilizumab [id number]

## 2020-04-09 NOTE — PROGRESS NOTE ADULT - SUBJECTIVE AND OBJECTIVE BOX
INTERVAL HPI/OVERNIGHT EVENTS:    Patient is a 60y old  Male who presents with a chief complaint of COVID (09 Apr 2020 06:31)      Pt reports the following symptoms:    CONSTITUTIONAL:  Negative fever or chills, feels well, good appetite  EYES:  Negative  blurry vision or double vision  CARDIOVASCULAR:  Negative for chest pain or palpitations  RESPIRATORY:  Negative for cough, wheezing, or SOB   GASTROINTESTINAL:  Negative for nausea, vomiting, diarrhea, constipation, or abdominal pain  GENITOURINARY:  Negative frequency, urgency or dysuria  NEUROLOGIC:  No headache, confusion, dizziness, lightheadedness    MEDICATIONS  (STANDING):  acetaminophen   Tablet .. 650 milliGRAM(s) Oral every 6 hours  atorvastatin 40 milliGRAM(s) Oral at bedtime  dextrose 5%. 1000 milliLiter(s) (50 mL/Hr) IV Continuous <Continuous>  dextrose 50% Injectable 12.5 Gram(s) IV Push once  enoxaparin Injectable 70 milliGRAM(s) SubCutaneous every 12 hours  famotidine    Tablet 20 milliGRAM(s) Oral daily  insulin glargine Injectable (LANTUS) 35 Unit(s) SubCutaneous at bedtime  insulin lispro (HumaLOG) corrective regimen sliding scale   SubCutaneous Before meals and at bedtime  insulin lispro Injectable (HumaLOG) 14 Unit(s) SubCutaneous three times a day before meals  lisinopril 10 milliGRAM(s) Oral daily  propranolol 20 milliGRAM(s) Oral three times a day    MEDICATIONS  (PRN):  acetaminophen   Tablet .. 325 milliGRAM(s) Oral every 4 hours PRN Temp greater or equal to 38C (100.4F)  ALBUTerol    90 MICROgram(s) HFA Inhaler 2 Puff(s) Inhalation every 6 hours PRN Shortness of Breath  dextrose 40% Gel 15 Gram(s) Oral once PRN Blood Glucose LESS THAN 70 milliGRAM(s)/deciliter  glucagon  Injectable 1 milliGRAM(s) IntraMuscular once PRN Glucose LESS THAN 70 milligrams/deciliter  zolpidem 5 milliGRAM(s) Oral at bedtime PRN Insomnia      PHYSICAL EXAM  Vital Signs Last 24 Hrs  T(C): 36.6 (09 Apr 2020 10:40), Max: 36.6 (09 Apr 2020 10:40)  T(F): 97.8 (09 Apr 2020 10:40), Max: 97.8 (09 Apr 2020 10:40)  HR: 78 (09 Apr 2020 12:30) (61 - 78)  BP: 96/62 (09 Apr 2020 12:30) (96/62 - 135/80)  BP(mean): 74 (09 Apr 2020 12:30) (74 - 102)  RR: 35 (09 Apr 2020 12:30) (18 - 35)  SpO2: 90% (09 Apr 2020 12:30) (90% - 96%)    Constitutional: wn/wd in NAD.   HEENT: NCAT, MMM, OP clear, EOMI, no proptosis or lid retraction  Neck: no thyromegaly or palpable thyroid nodules   Respiratory: lungs CTAB.  Cardiovascular: regular rhythm, normal S1 and S2, no audible murmurs, no peripheral edema  GI: soft, NT/ND, no masses/HSM appreciated.  Neurology: no tremors, DTR 2+  Skin: no visible rashes/lesions  Psychiatric: AAO x 3, normal affect/mood.    LABS:                        14.9   5.85  )-----------( 444      ( 09 Apr 2020 07:45 )             44.1     04-09    136  |  102  |  13  ----------------------------<  155<H>  4.7   |  23  |  0.44<L>    Ca    8.9      09 Apr 2020 07:45  Phos  3.0     04-09  Mg     2.2     04-09    TPro  6.3  /  Alb  3.0<L>  /  TBili  0.6  /  DBili  x   /  AST  26  /  ALT  26  /  AlkPhos  76  04-09            HbA1C: 10.0 % (04-04 @ 10:24)    CAPILLARY BLOOD GLUCOSE      POCT Blood Glucose.: 173 mg/dL (09 Apr 2020 12:05)  POCT Blood Glucose.: 178 mg/dL (09 Apr 2020 08:16)  POCT Blood Glucose.: 125 mg/dL (09 Apr 2020 05:39)  POCT Blood Glucose.: 212 mg/dL (08 Apr 2020 20:55)  POCT Blood Glucose.: 210 mg/dL (08 Apr 2020 16:07)      Insulin Sliding Scale requirements X 24 Hours:    RADIOLOGY & ADDITIONAL TESTS:    A/P: 60y Male with history of DM type II presenting for       1.  DM -     Please continue           units lantus at bedtime  / in the morning and        units lispro with meals and lispro moderate / low dose sliding scale 4 times daily with meals and at bedtime.  Please continue consistent carbohydrate diet.      Goal FSG is   Will continue to monitor   For discharge, pt can continue    Pt can follow up at discharge with [facility] by calling  to make an appointment.   Will discuss case with     and update primary team INTERVAL HPI/OVERNIGHT EVENTS:    Patient is a 60y old  Male who presents with a chief complaint of COVID (09 Apr 2020 13:51)  No acute overnight events  eating well  insulin administration reviewed  now on 6L NC for oxygen support   steroids given 6 am today    Pt reports the following symptoms:    CONSTITUTIONAL:  Negative fever or chills, feels well, good appetite  EYES:  Negative  blurry vision or double vision  CARDIOVASCULAR:  Negative for chest pain or palpitations  RESPIRATORY:  Negative for cough, wheezing, or SOB   GASTROINTESTINAL:  Negative for nausea, vomiting, diarrhea, constipation, or abdominal pain  GENITOURINARY:  Negative frequency, urgency or dysuria  NEUROLOGIC:  No headache, confusion, dizziness, lightheadedness    MEDICATIONS  (STANDING):  acetaminophen   Tablet .. 650 milliGRAM(s) Oral every 6 hours  atorvastatin 40 milliGRAM(s) Oral at bedtime  dextrose 5%. 1000 milliLiter(s) (50 mL/Hr) IV Continuous <Continuous>  dextrose 50% Injectable 12.5 Gram(s) IV Push once  enoxaparin Injectable 70 milliGRAM(s) SubCutaneous every 12 hours  famotidine    Tablet 20 milliGRAM(s) Oral daily  insulin glargine Injectable (LANTUS) 35 Unit(s) SubCutaneous at bedtime  insulin lispro (HumaLOG) corrective regimen sliding scale   SubCutaneous Before meals and at bedtime  insulin lispro Injectable (HumaLOG) 14 Unit(s) SubCutaneous three times a day before meals  lisinopril 10 milliGRAM(s) Oral daily  propranolol 20 milliGRAM(s) Oral three times a day    MEDICATIONS  (PRN):  acetaminophen   Tablet .. 325 milliGRAM(s) Oral every 4 hours PRN Temp greater or equal to 38C (100.4F)  ALBUTerol    90 MICROgram(s) HFA Inhaler 2 Puff(s) Inhalation every 6 hours PRN Shortness of Breath  dextrose 40% Gel 15 Gram(s) Oral once PRN Blood Glucose LESS THAN 70 milliGRAM(s)/deciliter  glucagon  Injectable 1 milliGRAM(s) IntraMuscular once PRN Glucose LESS THAN 70 milligrams/deciliter  zolpidem 5 milliGRAM(s) Oral at bedtime PRN Insomnia      Past medical history reviewed  Family history reviewed  Social history reviewed    PHYSICAL EXAM  Vital Signs Last 24 Hrs  T(C): 36.7 (09 Apr 2020 22:57), Max: 36.7 (09 Apr 2020 14:18)  T(F): 98.1 (09 Apr 2020 22:57), Max: 98.1 (09 Apr 2020 14:18)  HR: 69 (09 Apr 2020 22:57) (61 - 78)  BP: 133/68 (09 Apr 2020 22:57) (96/62 - 133/68)  BP(mean): 94 (09 Apr 2020 17:05) (74 - 94)  RR: 28 (09 Apr 2020 17:05) (18 - 35)  SpO2: 93% (09 Apr 2020 22:57) (89% - 95%)    Due to the nature of this patient’s COVID-19 isolation status (either confirmed or suspected), this note was prepared without a bedside physical examination to prevent spread of infection and to conserve personal protective equipment during this nationwide pandemic. If possible, direct patient communication occurred via electronic measures or telephone conversation. Examination highlights were provided by a bedside nurse wearing personal protective equipment and review of pertinent medical records. Objective data (vital signs, urine output, lab results, imaging studies, medications, etc) were reviewed in detail. Face to face visits and physical examination have been limited only to patients for whom it is required for medical decision making.    LABS:                        14.9   5.85  )-----------( 444      ( 09 Apr 2020 07:45 )             44.1     04-09    136  |  102  |  13  ----------------------------<  155<H>  4.7   |  23  |  0.44<L>    Ca    8.9      09 Apr 2020 07:45  Phos  3.0     04-09  Mg     2.2     04-09    TPro  6.3  /  Alb  3.0<L>  /  TBili  0.6  /  DBili  x   /  AST  26  /  ALT  26  /  AlkPhos  76  04-09    HbA1C: 10.0 % (04-04 @ 10:24)    CAPILLARY BLOOD GLUCOSE      POCT Blood Glucose.: 140 mg/dL (09 Apr 2020 21:31)  POCT Blood Glucose.: 138 mg/dL (09 Apr 2020 16:21)  POCT Blood Glucose.: 173 mg/dL (09 Apr 2020 12:05)  POCT Blood Glucose.: 178 mg/dL (09 Apr 2020 08:16)  POCT Blood Glucose.: 125 mg/dL (09 Apr 2020 05:39)      Direct LDL: 137 mg/dL (04-06-20 @ 17:12)

## 2020-04-09 NOTE — PROGRESS NOTE ADULT - ATTENDING COMMENTS
A/P 60yMale with hx of DM presenting for management of COVID with improving glycemic control    1.  DM: type 2, uncontrolled,   lantus 35 at bedtime, lispro 10 TID with meals.   Continue lispro moderate dose scale with meals and at bedtime.   Continue consistent carb diet  FSG Goal 100-180    2.  Hyperlipidemia - goal LDL < 70  continue statin    For discharge:   can continue insulin, dose to be determined by clinical course  Pt is advised to follow up with me at discharge by calling .

## 2020-04-09 NOTE — PROGRESS NOTE ADULT - PROBLEM SELECTOR PLAN 2
#Increased anion gap --> NOW RESOLVED  Patient with AG of 27 with ketones in urine. Lactate 2.0. No history of renal failure. Likely from DKA -- > now resolved  - betahydroxybutyrate 4.8  -Monitor bmp Patient with AG of 27 with ketones in urine. Lactate 2.0. No history of renal failure. Likely from DKA -- > now resolved  -basal bolus per endocrine

## 2020-04-10 ENCOUNTER — TRANSCRIPTION ENCOUNTER (OUTPATIENT)
Age: 60
End: 2020-04-10

## 2020-04-10 LAB
ALBUMIN SERPL ELPH-MCNC: 3.2 G/DL — LOW (ref 3.3–5)
ALP SERPL-CCNC: 78 U/L — SIGNIFICANT CHANGE UP (ref 40–120)
ALT FLD-CCNC: 69 U/L — HIGH (ref 10–45)
ANION GAP SERPL CALC-SCNC: 11 MMOL/L — SIGNIFICANT CHANGE UP (ref 5–17)
AST SERPL-CCNC: 124 U/L — HIGH (ref 10–40)
BASOPHILS # BLD AUTO: 0.01 K/UL — SIGNIFICANT CHANGE UP (ref 0–0.2)
BASOPHILS NFR BLD AUTO: 0.2 % — SIGNIFICANT CHANGE UP (ref 0–2)
BILIRUB SERPL-MCNC: 0.5 MG/DL — SIGNIFICANT CHANGE UP (ref 0.2–1.2)
BUN SERPL-MCNC: 13 MG/DL — SIGNIFICANT CHANGE UP (ref 7–23)
CALCIUM SERPL-MCNC: 8.9 MG/DL — SIGNIFICANT CHANGE UP (ref 8.4–10.5)
CHLORIDE SERPL-SCNC: 102 MMOL/L — SIGNIFICANT CHANGE UP (ref 96–108)
CO2 SERPL-SCNC: 26 MMOL/L — SIGNIFICANT CHANGE UP (ref 22–31)
CREAT SERPL-MCNC: 0.52 MG/DL — SIGNIFICANT CHANGE UP (ref 0.5–1.3)
CRP SERPL-MCNC: 1.26 MG/DL — HIGH (ref 0–0.4)
D DIMER BLD IA.RAPID-MCNC: 1734 NG/ML DDU — HIGH
EOSINOPHIL # BLD AUTO: 0.13 K/UL — SIGNIFICANT CHANGE UP (ref 0–0.5)
EOSINOPHIL NFR BLD AUTO: 2.3 % — SIGNIFICANT CHANGE UP (ref 0–6)
FERRITIN SERPL-MCNC: 1619 NG/ML — HIGH (ref 30–400)
GLUCOSE BLDC GLUCOMTR-MCNC: 151 MG/DL — HIGH (ref 70–99)
GLUCOSE BLDC GLUCOMTR-MCNC: 193 MG/DL — HIGH (ref 70–99)
GLUCOSE BLDC GLUCOMTR-MCNC: 87 MG/DL — SIGNIFICANT CHANGE UP (ref 70–99)
GLUCOSE BLDC GLUCOMTR-MCNC: 94 MG/DL — SIGNIFICANT CHANGE UP (ref 70–99)
GLUCOSE SERPL-MCNC: 77 MG/DL — SIGNIFICANT CHANGE UP (ref 70–99)
HCT VFR BLD CALC: 46.3 % — SIGNIFICANT CHANGE UP (ref 39–50)
HGB BLD-MCNC: 15.5 G/DL — SIGNIFICANT CHANGE UP (ref 13–17)
IMM GRANULOCYTES NFR BLD AUTO: 1.2 % — SIGNIFICANT CHANGE UP (ref 0–1.5)
LYMPHOCYTES # BLD AUTO: 0.81 K/UL — LOW (ref 1–3.3)
LYMPHOCYTES # BLD AUTO: 14.1 % — SIGNIFICANT CHANGE UP (ref 13–44)
MAGNESIUM SERPL-MCNC: 2.2 MG/DL — SIGNIFICANT CHANGE UP (ref 1.6–2.6)
MCHC RBC-ENTMCNC: 31 PG — SIGNIFICANT CHANGE UP (ref 27–34)
MCHC RBC-ENTMCNC: 33.5 GM/DL — SIGNIFICANT CHANGE UP (ref 32–36)
MCV RBC AUTO: 92.6 FL — SIGNIFICANT CHANGE UP (ref 80–100)
MONOCYTES # BLD AUTO: 0.26 K/UL — SIGNIFICANT CHANGE UP (ref 0–0.9)
MONOCYTES NFR BLD AUTO: 4.5 % — SIGNIFICANT CHANGE UP (ref 2–14)
NEUTROPHILS # BLD AUTO: 4.47 K/UL — SIGNIFICANT CHANGE UP (ref 1.8–7.4)
NEUTROPHILS NFR BLD AUTO: 77.7 % — HIGH (ref 43–77)
NRBC # BLD: 0 /100 WBCS — SIGNIFICANT CHANGE UP (ref 0–0)
PHOSPHATE SERPL-MCNC: 4 MG/DL — SIGNIFICANT CHANGE UP (ref 2.5–4.5)
PLATELET # BLD AUTO: 504 K/UL — HIGH (ref 150–400)
POTASSIUM SERPL-MCNC: 4.2 MMOL/L — SIGNIFICANT CHANGE UP (ref 3.5–5.3)
POTASSIUM SERPL-SCNC: 4.2 MMOL/L — SIGNIFICANT CHANGE UP (ref 3.5–5.3)
PROT SERPL-MCNC: 6.2 G/DL — SIGNIFICANT CHANGE UP (ref 6–8.3)
RBC # BLD: 5 M/UL — SIGNIFICANT CHANGE UP (ref 4.2–5.8)
RBC # FLD: 12.3 % — SIGNIFICANT CHANGE UP (ref 10.3–14.5)
SODIUM SERPL-SCNC: 139 MMOL/L — SIGNIFICANT CHANGE UP (ref 135–145)
WBC # BLD: 5.75 K/UL — SIGNIFICANT CHANGE UP (ref 3.8–10.5)
WBC # FLD AUTO: 5.75 K/UL — SIGNIFICANT CHANGE UP (ref 3.8–10.5)

## 2020-04-10 PROCEDURE — 93970 EXTREMITY STUDY: CPT | Mod: 26

## 2020-04-10 PROCEDURE — 99233 SBSQ HOSP IP/OBS HIGH 50: CPT | Mod: GC

## 2020-04-10 RX ORDER — INSULIN GLARGINE 100 [IU]/ML
30 INJECTION, SOLUTION SUBCUTANEOUS AT BEDTIME
Refills: 0 | Status: DISCONTINUED | OUTPATIENT
Start: 2020-04-10 | End: 2020-04-11

## 2020-04-10 RX ORDER — INSULIN LISPRO 100/ML
10 VIAL (ML) SUBCUTANEOUS
Refills: 0 | Status: DISCONTINUED | OUTPATIENT
Start: 2020-04-10 | End: 2020-04-11

## 2020-04-10 RX ADMIN — Medication 2: at 17:25

## 2020-04-10 RX ADMIN — FAMOTIDINE 20 MILLIGRAM(S): 10 INJECTION INTRAVENOUS at 11:18

## 2020-04-10 RX ADMIN — ENOXAPARIN SODIUM 70 MILLIGRAM(S): 100 INJECTION SUBCUTANEOUS at 06:17

## 2020-04-10 RX ADMIN — ENOXAPARIN SODIUM 70 MILLIGRAM(S): 100 INJECTION SUBCUTANEOUS at 17:25

## 2020-04-10 RX ADMIN — Medication 10 UNIT(S): at 13:21

## 2020-04-10 RX ADMIN — ATORVASTATIN CALCIUM 40 MILLIGRAM(S): 80 TABLET, FILM COATED ORAL at 21:53

## 2020-04-10 RX ADMIN — ZOLPIDEM TARTRATE 5 MILLIGRAM(S): 10 TABLET ORAL at 23:42

## 2020-04-10 RX ADMIN — Medication 10 UNIT(S): at 10:39

## 2020-04-10 RX ADMIN — Medication 10 UNIT(S): at 17:25

## 2020-04-10 RX ADMIN — Medication 650 MILLIGRAM(S): at 06:17

## 2020-04-10 RX ADMIN — ZOLPIDEM TARTRATE 5 MILLIGRAM(S): 10 TABLET ORAL at 00:16

## 2020-04-10 RX ADMIN — Medication 2: at 13:21

## 2020-04-10 RX ADMIN — INSULIN GLARGINE 30 UNIT(S): 100 INJECTION, SOLUTION SUBCUTANEOUS at 22:12

## 2020-04-10 NOTE — DISCHARGE NOTE PROVIDER - NSDCMRMEDTOKEN_GEN_ALL_CORE_FT
fenofibrate 130 mg oral capsule: 1 cap(s) orally once a day  glimepiride 4 mg oral tablet: 2 tab(s) orally once a day  Januvia 100 mg oral tablet: 1 tab(s) orally once a day  Jardiance 25 mg oral tablet: 1 tab(s) orally once a day (in the morning)  lisinopril 10 mg oral tablet: 1 tab(s) orally once a day  metFORMIN 850 mg oral tablet: 1 tab(s) orally 2 times a day  propranolol 20 mg oral tablet: 1 tab(s) orally 3 times a day  traZODone 100 mg oral tablet: orally once a day (at bedtime) acetaminophen 325 mg oral tablet: 1 tab(s) orally every 4 hours, As needed, Temp greater or equal to 38C (100.4F)  albuterol 90 mcg/inh inhalation aerosol: 2 puff(s) inhaled every 6 hours, As needed, Shortness of Breath  atorvastatin 40 mg oral tablet: 1 tab(s) orally once a day (at bedtime)  Eliquis 5 mg oral tablet: 1 tab(s) orally 2 times a day   fenofibrate 130 mg oral capsule: 1 cap(s) orally once a day  glimepiride 4 mg oral tablet: 2 tab(s) orally once a day  Januvia 100 mg oral tablet: 1 tab(s) orally once a day  Jardiance 25 mg oral tablet: 1 tab(s) orally once a day (in the morning)  lisinopril 10 mg oral tablet: 1 tab(s) orally once a day  metFORMIN 850 mg oral tablet: 1 tab(s) orally 2 times a day  propranolol 20 mg oral tablet: 1 tab(s) orally 3 times a day  traZODone 100 mg oral tablet: orally once a day (at bedtime) acetaminophen 325 mg oral tablet: 1 tab(s) orally every 4 hours, As needed, Temp greater or equal to 38C (100.4F)  albuterol 90 mcg/inh inhalation aerosol: 2 puff(s) inhaled every 6 hours, As needed, Shortness of Breath  atorvastatin 40 mg oral tablet: 1 tab(s) orally once a day (at bedtime)  Eliquis 5 mg oral tablet: 1 tab(s) orally 2 times a day   fenofibrate 130 mg oral capsule: 1 cap(s) orally once a day  insulin lispro 100 units/mL injectable solution: 8 unit(s) injectable 3 times a day (before meals)   Januvia 100 mg oral tablet: 1 tab(s) orally once a day  Lantus 100 units/mL subcutaneous solution: 26 unit(s) subcutaneous once a day (at bedtime)   lisinopril 10 mg oral tablet: 1 tab(s) orally once a day  metFORMIN 850 mg oral tablet: 1 tab(s) orally 2 times a day  propranolol 20 mg oral tablet: 1 tab(s) orally 3 times a day  traZODone 100 mg oral tablet: 1 tab(s) orally once a day (at bedtime)

## 2020-04-10 NOTE — PROGRESS NOTE ADULT - PROBLEM SELECTOR PLAN 5
Pt with history of seasonal asthma, only requiring occasional albuterol inhaler use. Never intubated or hospitalized for asthma  -Prn albuterol inhaler Pt with history of seasonal asthma, only requiring occasional albuterol inhaler use. Never intubated or hospitalized for asthma  - Prn albuterol inhaler Pt with known history of HTN. Should be on lisinopril and propranolol at home as prescribed but has not been taking given lack of insurance.  - Continue with prescribed propranolol 20mg TID and lisinopril 10mg daily  - SBP stable 90-130s, SBP stable 60-80s with HR 60-70s

## 2020-04-10 NOTE — DISCHARGE NOTE PROVIDER - NSDCCPCAREPLAN_GEN_ALL_CORE_FT
PRINCIPAL DISCHARGE DIAGNOSIS  Diagnosis: Suspected 2019 novel coronavirus infection  Assessment and Plan of Treatment: You came to the hospital with signs and symptoms concerning for COVID-19. You were swabbed for the infection and it returned positive. You were treated with Azithromycin and Plaquenil while you were in the hospital. The treatment duration is 5 days and if you have not completed 5 days, you will be discharged with enough medication to finish the 5 day course at home. You have been clinically stable and deemed safe for discharge home to continue your treatment course and quarantine.  While you are at home you should stay in your own room whenever possible and wear a mask as much as possible. If you have to be in the same room as someone else, you should both wear a mask. If you share a restroom, you should wipe it down with bleach wipes between each use.  Please continue to practice social distancing and good hand hygiene.  Please follow the remainder of the instructions provided to you at discharge.  You need to quarantine for 14 days after your positive test result, which means you need to quarantine until 4/18/20.        SECONDARY DISCHARGE DIAGNOSES  Diagnosis: Hyperglycemia  Assessment and Plan of Treatment:

## 2020-04-10 NOTE — PROGRESS NOTE ADULT - PROBLEM SELECTOR PLAN 1
Pt currently on venti (50%) saturating 90-95%, will wean to 6L NC today.   - Isolation precautions; contact and airborne  - trend COVID-19 labs  - Monitor O2 saturation, monitor for respiratory distress  - albuterol MDI; avoid duonebs  - c/w hydroxychloroquine 400mg IV BID x2 doses, then 200mg BID for 4 days (4/5-  - s/p azithromycin 250mg QD (4/5-4/9)  - tylenol standing 675mg PO given pt's inflammatory markers, with 325mg q6 hours prn for fevers despite standing Tylenol. Can d/c standing Tylenol if pt continues to be afebrile   - s/p solumedrol 40mg IV bid (4/4 -4/9 ) and tocilizumab [id number] Pt currently weaned 4/9 from venti (50%) to 6L NC, sat 93-97%.   - Isolation precautions; contact and airborne  - Trend COVID-19 labs (D-dimer and CRP downtrending, Ferritin w/ slight uptrend)  - Wean O2 as tolerated today. Monitor O2 saturation, monitor for respiratory distress  - Albuterol MDI; avoid duonebs  - DUAL course completed 4/9 of hydroxychloroquine and azithromycin   - Also received Toci x1, and steroid course completed 4/9  - Tylenol 325mg q6 hours prn for fevers  - Encourage Is use, cough and deep breathing  - Venous duplex today. Pt currently on full AC therapy given hypercoag and elevated admission inflammatory markers. Will transition to prophy dosing if able to wean O2 and venous duplex negative. Pt currently weaned 4/9 from venti (50%) to 6L NC, sat 93-97%.   - Isolation precautions; contact and airborne  - Trend COVID-19 labs (D-dimer and CRP downtrending, Ferritin w/ slight uptrend)  - Wean O2 as tolerated today. Monitor O2 saturation, monitor for respiratory distress  - Albuterol MDI; avoid duonebs  - DUAL course completed 4/9 of hydroxychloroquine and azithromycin   - Also received Toci x1, and steroid course completed 4/9  - Tylenol 325mg q6 hours prn for fevers  - Encourage Is use, cough and deep breathing

## 2020-04-10 NOTE — PROGRESS NOTE ADULT - PROBLEM SELECTOR PLAN 4
Pt with known history of htn. Should be on lisinopril and propranolol at home as prescribed but has not been taking given lack of insurance  -Continue with prescribed propranolol 20mg TID and lisinopril 10mg daily Pt with known history of HTN. Should be on lisinopril and propranolol at home as prescribed but has not been taking given lack of insurance.  - Continue with prescribed propranolol 20mg TID and lisinopril 10mg daily  - SBP stable 90-130s, SBP stable 60-80s with HR 60-70s - Venous duplex today. Pt currently on full AC therapy given hypercoag and elevated admission inflammatory markers. Will transition to prophy dosing if able to wean O2 and venous duplex negative.

## 2020-04-10 NOTE — DISCHARGE NOTE PROVIDER - PROVIDER TOKENS
FREE:[LAST:[Kyle],FIRST:[Humphrey],PHONE:[phone]],FAX:[(   [phone]    -],ADDRESS:[[address] [place] [address]],FOLLOWUP:[1 month]]

## 2020-04-10 NOTE — PROGRESS NOTE ADULT - SUBJECTIVE AND OBJECTIVE BOX
OVERNIGHT EVENTS: No acute events overnight. Pt stepped down from [facility] COVID to [facility]. States he slept comfortable. Denies any SOB, chills, N/V/D, productive cough     SUBJECTIVE: Patient seen and examined at the bedside in full PPE.     12-point ROS reviewed and is otherwise negative.    Vital Signs Last 12 Hrs  T(F): 97.7 (04-10-20 @ 05:12), Max: 98.1 (04-09-20 @ 22:57)  HR: 60 (04-10-20 @ 05:12) (60 - 69)  BP: 120/80 (04-10-20 @ 05:12) (120/80 - 133/68)  BP(mean): --  RR: 20 (04-10-20 @ 05:12) (20 - 20)  SpO2: 97% (04-10-20 @ 05:12) (93% - 97%)    PHYSICAL EXAM:  General: NAD, resting comfortably in bed  HEENT: NCAT, PERRL  Neck: no JVD  Respiratory: RLL w/ coarse breath sounds; LLL w/ diminished breath sounds, otherwise clear to auscultation  Cardiovascular: normal S1/S2, RRR, no MRG  Vascular: 2+ radial/DP pulses b/l  Abdomen: +BS x4 quadrants, soft, NTND  Extremities: WWP, no clubbing, no cyanosis, no edema  Skin: no gross skin abnormalities or rashes noted  Neuro: AAOx3, no focal neurological deficits    LABS:                        15.5   5.75  )-----------( 504      ( 10 Apr 2020 07:32 )             46.3     04-10    139  |  102  |  13  ----------------------------<  77  4.2   |  26  |  0.52    Ca    8.9      10 Apr 2020 07:32  Phos  4.0     04-10  Mg     2.2     04-10    TPro  6.2  /  Alb  3.2<L>  /  TBili  0.5  /  DBili  x   /  AST  124<H>  /  ALT  69<H>  /  AlkPhos  78  04-10          RADIOLOGY & ADDITIONAL TESTS: Reviewed    MEDICATIONS  (STANDING):  acetaminophen   Tablet .. 650 milliGRAM(s) Oral every 6 hours  atorvastatin 40 milliGRAM(s) Oral at bedtime  dextrose 5%. 1000 milliLiter(s) (50 mL/Hr) IV Continuous <Continuous>  dextrose 50% Injectable 12.5 Gram(s) IV Push once  enoxaparin Injectable 70 milliGRAM(s) SubCutaneous every 12 hours  famotidine    Tablet 20 milliGRAM(s) Oral daily  insulin glargine Injectable (LANTUS) 35 Unit(s) SubCutaneous at bedtime  insulin lispro (HumaLOG) corrective regimen sliding scale   SubCutaneous Before meals and at bedtime  insulin lispro Injectable (HumaLOG) 10 Unit(s) SubCutaneous three times a day before meals  lisinopril 10 milliGRAM(s) Oral daily  propranolol 20 milliGRAM(s) Oral three times a day    MEDICATIONS  (PRN):  acetaminophen   Tablet .. 325 milliGRAM(s) Oral every 4 hours PRN Temp greater or equal to 38C (100.4F)  ALBUTerol    90 MICROgram(s) HFA Inhaler 2 Puff(s) Inhalation every 6 hours PRN Shortness of Breath  dextrose 40% Gel 15 Gram(s) Oral once PRN Blood Glucose LESS THAN 70 milliGRAM(s)/deciliter  glucagon  Injectable 1 milliGRAM(s) IntraMuscular once PRN Glucose LESS THAN 70 milligrams/deciliter  zolpidem 5 milliGRAM(s) Oral at bedtime PRN Insomnia OVERNIGHT EVENTS: No acute events overnight. Pt stepped down from [facility] COVID to [facility].     SUBJECTIVE: States he slept comfortable. Denies any SOB, chills, N/V/D, or productive cough. He has been using the incentive spirometer as instructed.     OBJECTIVE: Patient seen and examined at the bedside in full PPE. Appears comfortable in bed with no use of accessory muscles on 6LO2 via NC.     12-point ROS reviewed and is otherwise negative.    Vital Signs Last 12 Hrs  T(F): 97.7 (04-10-20 @ 05:12), Max: 98.1 (04-09-20 @ 22:57)  HR: 60 (04-10-20 @ 05:12) (60 - 69)  BP: 120/80 (04-10-20 @ 05:12) (120/80 - 133/68)  BP(mean): --  RR: 20 (04-10-20 @ 05:12) (20 - 20)  SpO2: 97% (04-10-20 @ 05:12) (93% - 97%)    PHYSICAL EXAM:  General: NAD, resting comfortably in bed  HEENT: NCAT, PERRL  Neck: no JVD  Respiratory: RLL w/ coarse breath sounds; LLL w/ diminished breath sounds, otherwise clear to auscultation  Cardiovascular: normal S1/S2, RRR, no MRG  Vascular: 2+ radial/DP pulses b/l  Abdomen: +BS x4 quadrants, soft, NTND  Extremities: WWP, no clubbing, no cyanosis, no edema  Skin: no gross skin abnormalities or rashes noted  Neuro: AAOx3, no focal neurological deficits    LABS:                        15.5   5.75  )-----------( 504      ( 10 Apr 2020 07:32 )             46.3     04-10    139  |  102  |  13  ----------------------------<  77  4.2   |  26  |  0.52    Ca    8.9      10 Apr 2020 07:32  Phos  4.0     04-10  Mg     2.2     04-10    TPro  6.2  /  Alb  3.2<L>  /  TBili  0.5  /  DBili  x   /  AST  124<H>  /  ALT  69<H>  /  AlkPhos  78  04-10          RADIOLOGY & ADDITIONAL TESTS: Reviewed    MEDICATIONS  (STANDING):  acetaminophen   Tablet .. 650 milliGRAM(s) Oral every 6 hours  atorvastatin 40 milliGRAM(s) Oral at bedtime  dextrose 5%. 1000 milliLiter(s) (50 mL/Hr) IV Continuous <Continuous>  dextrose 50% Injectable 12.5 Gram(s) IV Push once  enoxaparin Injectable 70 milliGRAM(s) SubCutaneous every 12 hours  famotidine    Tablet 20 milliGRAM(s) Oral daily  insulin glargine Injectable (LANTUS) 35 Unit(s) SubCutaneous at bedtime  insulin lispro (HumaLOG) corrective regimen sliding scale   SubCutaneous Before meals and at bedtime  insulin lispro Injectable (HumaLOG) 10 Unit(s) SubCutaneous three times a day before meals  lisinopril 10 milliGRAM(s) Oral daily  propranolol 20 milliGRAM(s) Oral three times a day    MEDICATIONS  (PRN):  acetaminophen   Tablet .. 325 milliGRAM(s) Oral every 4 hours PRN Temp greater or equal to 38C (100.4F)  ALBUTerol    90 MICROgram(s) HFA Inhaler 2 Puff(s) Inhalation every 6 hours PRN Shortness of Breath  dextrose 40% Gel 15 Gram(s) Oral once PRN Blood Glucose LESS THAN 70 milliGRAM(s)/deciliter  glucagon  Injectable 1 milliGRAM(s) IntraMuscular once PRN Glucose LESS THAN 70 milligrams/deciliter  zolpidem 5 milliGRAM(s) Oral at bedtime PRN Insomnia

## 2020-04-10 NOTE — PROGRESS NOTE ADULT - ATTENDING COMMENTS
Agree with above.   pt planned for dc to [facility] tomorrow  remains on 6L NC  eating well  insulin administration reviewed  no longer on steroids  Due to the nature of this patient’s COVID-19 isolation status (either confirmed or suspected), this note was prepared without a bedside physical examination to prevent spread of infection and to conserve personal protective equipment during this nationwide pandemic. Objective data (vital signs, urine output, lab results, imaging studies, medications, etc) were reviewed in detail. Physical examinations have been limited only to patients for whom it is required for medical decision making.    Can continue 30 units lantus at bedtime, lispro 10 units TID with meals, moderate dose scale ISF-25 with meals and at bedtime with lispro  consistent carb diet  continue atorvastatin 40mg daily  will follow

## 2020-04-10 NOTE — PROGRESS NOTE ADULT - PROBLEM SELECTOR PLAN 7
F: none  E: Replete for K<4 and Mag <2  N: Diet Carb Consistnet  A: Lovenox (full)   Dispo: tele  Code: FULL CODE F: none  E: Replete for K<4 and Mag <2  N: Diet Carb Consistent  A: Lovenox (full, pending venous duplex)  Dispo: [facility] and possibly [facility] tomorrow   Code: FULL CODE Pt with no reported history of hyperlipidemia or hypercholesterolemia but is prescribed fenofibrate 130mg to be taken daily. Given lack of insurance, has not been taking this medication for prolonged time  - Low utility in restarting medication if pt has not been on it, will hold off for now

## 2020-04-10 NOTE — PROGRESS NOTE ADULT - PROBLEM SELECTOR PLAN 3
Patient with known history of type 2 diabetes. On metformin, jardiance, and januvia at home  - Found to be in DKA on the floors, since resolved   - C/W basal bolus, being followed by endocrine closely as patient now s/p steroids Patient with known history of T2DM. On metformin, jardiance, and januvia at home. (A1c on admission 10.0)  - Found to be in DKA on the floors, since resolved. s/p Steroid course  - C/W regimen as above and discuss with endocrine changes

## 2020-04-10 NOTE — DISCHARGE NOTE PROVIDER - HOSPITAL COURSE
#Discharge: do not delete        59yo M w/ PMH of T2DM, HTN, and mild seasonal asthma (never hospitalized for it, occasionally prescribed inhalers) presenting with one week of experiencing fevers/chills, dry cough, and generalized weakness, with SOB over the last several days. Patient states that he has felt febrile but has always been around 98F when he has taken his temperature. He has also had significantly decreased po intake and has been having 1-2 episodes of watery diarrhea over the last 3-4 days. Patient denies chest pain, abdominal pain, nausea/vomiting,        On arrival to ED 4/4, T 98.2, , /88, RR 18 satting at 82% on room air (placed on 15L NRB with sats increasing to 95%.) Labs remarkable for lymphopenia, d-dimer 507, Na 129, chloride 91, carbon dioxide 17, anion gap 21, glucose 258, crp 43.14, ferritin 1557, vbg pH 7.42 with pCO2 31. UA with ketones and glucosuria. EKG sinus tach with no ischemic changes, qtc 428. CXR with patchy lung infiltrates. COVID pcr positive. Pt given cef, azithro, and 1L NS.                 4/4: Started tocilizumab and steroids in addition to azithro/plaquenil    O/N: BMP Na increased from 129 to 136 (w/in 6-8 correction), so no further intervention. Will check Na with AM labs.     4/5: saturation around 88 % on 6L NC so switched to 15L venti mask (satting <90% on 12L venti mask). Also found to be in DKA with Anion gap 22 with ketones in urine and acidotic (Glucose 300s).     O/N: Increased rate of D10 to 120cc/hr; gap closed x1, put in for 25 units lantus at night     4/6: AG inc on AM BMP continued with lispro Q2, repeat BMP     4/7: Patient start on full dose A/C - Patient weaned to Venti Mask. Endo: Increased Lantus to 30u and Lispro to 14u.      O/N: restarted home ambien 5mg for sleep    4/8: ALVA, continuing on venti (40%), saturating 93-94%. Increased Lantus to 35U.     4/9: signed out to F team. currently on 6L NC, saturating 90-92%, gave incentive spirometry. on full AC due to elevated d-dimer >1500     4/10: LEs venosu duplex ____. Lovenox transitioned?____. Lantus dec to 30 units per Endo                Problem List/Main Diagnoses (system-based):     1.     2.     3.     4.         Inpatient treatment course:  You were admitted to the hospital w/symptoms of _____ and tested positive for COVID19 on _____.  You were started on a course of Azithromycin and Hydroxychloroquine for a total of five (5) days.        New medications:         Labs to be followed outpatient:         Exam to be followed outpatient: #Discharge: do not delete        59yo M w/ PMH of T2DM, HTN, and mild seasonal asthma (never hospitalized for it, occasionally prescribed inhalers) presenting with one week of experiencing fevers/chills, dry cough, and generalized weakness, with SOB over the last several days. Patient states that he has felt febrile but has always been around 98F when he has taken his temperature. He has also had significantly decreased po intake and has been having 1-2 episodes of watery diarrhea over the last 3-4 days. Patient denies chest pain, abdominal pain, nausea/vomiting.        On arrival to ED 4/4, T 98.2, , /88, RR 18 satting at 82% on room air (placed on 15L NRB with sats increasing to 95%.) Labs remarkable for lymphopenia, d-dimer 507, Na 129, chloride 91, carbon dioxide 17, anion gap 21, glucose 258, crp 43.14, ferritin 1557, vbg pH 7.42 with pCO2 31. UA with ketones and glucosuria. EKG sinus tach with no ischemic changes, qtc 428. CXR with patchy lung infiltrates. COVID19 pcr positive (4/4). Pt admitted and started on 1 L of normal saline, plaquenil/azithromycin/methylprednisone for 5 days, and one dose of tocilizumab. Treatment course completed, he has remained stable and weaning O2 as tolerated. He is cleared to be transferred to the [facility]. PT and family member in agreement. Family member provided [facility] contact information.             4/4: Started tocilizumab and steroids in addition to azithro/plaquenil    O/N: BMP Na increased from 129 to 136 (w/in 6-8 correction), so no further intervention. Will check Na with AM labs.     4/5: saturation around 88 % on 6L NC so switched to 15L venti mask (satting <90% on 12L venti mask). Also found to be in DKA with Anion gap 22 with ketones in urine and acidotic (Glucose 300s).     O/N: Increased rate of D10 to 120cc/hr; gap closed x1, put in for 25 units lantus at night     4/6: AG inc on AM BMP continued with lispro Q2, repeat BMP     4/7: Patient start on full dose A/C - Patient weaned to Venti Mask. Endo: Increased Lantus to 30u and Lispro to 14u.      O/N: restarted home ambien 5mg for sleep    4/8: ALVA, continuing on venti (40%), saturating 93-94%. Increased Lantus to 35U.     4/9: signed out to [facility] team. currently on 6L NC, saturating 90-92%, gave incentive spirometry. on full AC due to elevated d-dimer >1500     4/10: LEs venosu duplex ____. Lovenox transitioned?____. Lantus dec to 30 units per Endo                Problem List/Main Diagnoses (system-based):     1.     2.     3.     4.         Inpatient treatment course:      4/4: Pt admitted to the hospital w/symptoms of COVID19. Tested positive on 4/4/20. Pt started on a course of Azithromycin 500mg PO daily x1 followed by 250mg PO daily for 4 days, Hydroxychloroquine 400mg BID PO x1 day followed by 200mg BID PO for 4 days, and Methylprednisolone 40mg IV q8hrs for 5 days. Also, a one time dose of Ceftriaxone 1g IV and Tocilizumab 400mg IV.         New medications: Insulin while at [facility] given holding home meds        Labs to be followed outpatient: N/A        Exam to be followed outpatient: Pulmonary status #Discharge: do not delete        59yo M w/ PMH of T2DM, HTN, and mild seasonal asthma (never hospitalized for it, occasionally prescribed inhalers) presenting with one week of experiencing fevers/chills, dry cough, and generalized weakness, with SOB over the last several days. Patient states that he has felt febrile but has always been around 98F when he has taken his temperature. He has also had significantly decreased po intake and has been having 1-2 episodes of watery diarrhea over the last 3-4 days. Patient denies chest pain, abdominal pain, nausea/vomiting.        On arrival to ED 4/4, T 98.2, , /88, RR 18 satting at 82% on room air (placed on 15L NRB with sats increasing to 95%.) Labs remarkable for lymphopenia, d-dimer 507, Na 129, chloride 91, carbon dioxide 17, anion gap 21, glucose 258, crp 43.14, ferritin 1557, vbg pH 7.42 with pCO2 31. UA with ketones and glucosuria. EKG sinus tach with no ischemic changes, qtc 428. CXR with patchy lung infiltrates. COVID19 pcr positive (4/4). Pt admitted and started on 1 L of normal saline, plaquenil/azithromycin/methylprednisone for 5 days, and one dose of tocilizumab. Treatment course completed, he has remained stable and weaning O2 as tolerated. He is cleared to be transferred to the [facility]. PT and family member in agreement. Family member provided [facility] contact information.             Problem List/Main Diagnoses (system-based):     1.     2.     3.     4.         Inpatient treatment course:      4/4: Pt admitted to the hospital w/symptoms of COVID19. Tested positive on 4/4/20. Pt started on a course of Azithromycin 500mg PO daily x1 followed by 250mg PO daily for 4 days, Hydroxychloroquine 400mg BID PO x1 day followed by 200mg BID PO for 4 days, and Methylprednisolone 40mg IV q8hrs for 5 days. Also, a one time dose of Ceftriaxone 1g IV and Tocilizumab 400mg IV. NA levels found to be 129 with improvement to 136.     4/5: Started on insulin lantus and padrial and sliding scale with given home meds are on hold, steroids were started and 22 anion gap, urine ketones and glucose levels of 300s consistent with DKA. Endocrine team was consulted and recommendations followed. Weaning of O2 attempted to 6L NC but desat to 88% so maintained on 15L venturi mask.     4/6-4/8: No acute events. Therapeutic lovenox started for elevated markers.     4/9: Pt weaned to 6L NC with O2sat >92%.  Transferred to regional medical floor. Completion of COVID treatment. Improvement in anion gap and glucose levels.     4/10:         New medications:    -Insulin while at [facility] given holding home meds but to be resumed at discharge    -    -    -        Labs to be followed outpatient: N/A        Exam to be followed outpatient: Pulmonary status #Discharge: do not delete        61yo M w/ PMH of T2DM, HTN, and mild seasonal asthma (never hospitalized for it, occasionally prescribed inhalers) presenting with one week of experiencing fevers/chills, dry cough, and generalized weakness, with SOB over the last several days. Patient states that he has felt febrile but has always been around 98F when he has taken his temperature. He has also had significantly decreased po intake and has been having 1-2 episodes of watery diarrhea over the last 3-4 days. Patient denies chest pain, abdominal pain, nausea/vomiting.        On arrival to ED 4/4, T 98.2, , /88, RR 18 satting at 82% on room air (placed on 15L NRB with sats increasing to 95%.) Labs remarkable for lymphopenia, d-dimer 507, Na 129, chloride 91, carbon dioxide 17, anion gap 21, glucose 258, crp 43.14, ferritin 1557, vbg pH 7.42 with pCO2 31. UA with ketones and glucosuria. EKG sinus tach with no ischemic changes, qtc 428. CXR with patchy lung infiltrates. COVID19 pcr positive (4/4). Pt admitted and started on 1 L of normal saline, plaquenil/azithromycin/methylprednisone for 5 days, and one dose of tocilizumab. Treatment course completed, he has remained stable and weaning O2 as tolerated. He is cleared to be transferred to the [facility]. PT and family member in agreement. Family member provided [facility] contact information.             Problem List/Main Diagnoses (system-based):     1. COVID    2. DKA    3. Acidosis    4. Hyponatremia        Inpatient treatment course:      4/4: Pt admitted to the hospital w/symptoms of COVID19. Tested positive on 4/4/20. Pt started on a course of Azithromycin 500mg PO daily x1 followed by 250mg PO daily for 4 days, Hydroxychloroquine 400mg BID PO x1 day followed by 200mg BID PO for 4 days, and Methylprednisolone 40mg IV q8hrs for 5 days. Also, a one time dose of Ceftriaxone 1g IV and Tocilizumab 400mg IV. NA levels found to be 129 with improvement to 136.     4/5: Started on insulin lantus and padrial and sliding scale with given home meds are on hold, steroids were started and 22 anion gap, urine ketones and glucose levels of 300s consistent with DKA. Endocrine team was consulted and recommendations followed. Weaning of O2 attempted to 6L NC but desat to 88% so maintained on 15L venturi mask.     4/6-4/8: No acute events. Therapeutic lovenox started for elevated markers.     4/9: Pt weaned to 6L NC with O2sat >92%.  Transferred to regional medical floor. Completion of COVID treatment. Improvement in anion gap and glucose levels.     4/10: LEs venous duplex performed.  Lantus dec to 30 units per Endo recs. Dopplers negative    4/11:  duplex negative for DVT but will continue Therapeutic lovenox for now. per Endo lantus decreased to 26 and Lispro to 8            New medications:    -Insulin while at [facility] given holding home meds but to be resumed at discharge    -Home meds to take while at [facility] (Metformin, Januvia)    -Eliquis 5 mg BID x 30 days            Labs to be followed outpatient: N/A        Exam to be followed outpatient: Pulmonary status 61yo M w/ PMH of T2DM, HTN, and mild seasonal asthma (never hospitalized for it, occasionally prescribed inhalers) presenting with one week of experiencing fevers/chills, dry cough, and generalized weakness, with SOB over the last several days. Patient states that he has felt febrile but has always been around 98F when he has taken his temperature. He has also had significantly decreased po intake and has been having 1-2 episodes of watery diarrhea over the last 3-4 days. Patient denies chest pain, abdominal pain, nausea/vomiting.        On arrival to ED 4/4, T 98.2, , /88, RR 18 satting at 82% on room air (placed on 15L NRB with sats increasing to 95%.) Labs remarkable for lymphopenia, d-dimer 507, Na 129, chloride 91, carbon dioxide 17, anion gap 21, glucose 258, crp 43.14, ferritin 1557, vbg pH 7.42 with pCO2 31. UA with ketones and glucosuria. EKG sinus tach with no ischemic changes, qtc 428. CXR with patchy lung infiltrates. COVID19 pcr positive (4/4). Pt admitted and started on 1 L of normal saline, plaquenil/azithromycin/methylprednisone for 5 days, and one dose of tocilizumab. Treatment course completed, he has remained stable and weaning O2 as tolerated. He is cleared to be transferred to the [facility]. PT and family member in agreement. Family member provided [facility] contact information.             Problem List/Main Diagnoses (system-based):     1. COVID    2. DKA    3. Acidosis    4. Hyponatremia        Inpatient treatment course:      4/4: Pt admitted to the hospital w/symptoms of COVID19. Tested positive on 4/4/20. Pt started on a course of Azithromycin 500mg PO daily x1 followed by 250mg PO daily for 4 days, Hydroxychloroquine 400mg BID PO x1 day followed by 200mg BID PO for 4 days, and Methylprednisolone 40mg IV q8hrs for 5 days. Also, a one time dose of Ceftriaxone 1g IV and Tocilizumab 400mg IV. NA levels found to be 129 with improvement to 136.     4/5: Started on insulin lantus and padrial and sliding scale with given home meds are on hold, steroids were started and 22 anion gap, urine ketones and glucose levels of 300s consistent with DKA. Endocrine team was consulted and recommendations followed. Weaning of O2 attempted to 6L NC but desat to 88% so maintained on 15L venturi mask.     4/6-4/8: No acute events. Therapeutic lovenox started for elevated markers.     4/9: Pt weaned to 6L NC with O2sat >92%.  Transferred to regional medical floor. Completion of COVID treatment. Improvement in anion gap and glucose levels.     4/10: LEs venous duplex performed.  Lantus dec to 30 units per Endo recs. Dopplers negative    4/11:  duplex negative for DVT but will continue Therapeutic anticoagulation for 30 days. per Endo lantus decreased to 26 and Lispro to 8            New medications:    -Insulin while at [facility] given holding home meds but to be resumed at discharge    -Home meds to take while at [facility] (Metformin, Januvia)    -Eliquis 5 mg BID x 30 days            Labs to be followed outpatient: N/A        Exam to be followed outpatient: Pulmonary status

## 2020-04-10 NOTE — DISCHARGE NOTE PROVIDER - NSDCFUADDAPPT_GEN_ALL_CORE_FT
Followup with you Primary care provider 2 weeks after discharge Followup with you Primary care provider 1 month after discharge

## 2020-04-10 NOTE — DISCHARGE NOTE PROVIDER - CARE PROVIDER_API CALL
Humphrey Wright  [address], [place] [address]  Phone: [phone]  Fax: (   )    -  Follow Up Time: 1 month

## 2020-04-10 NOTE — PROGRESS NOTE ADULT - ASSESSMENT
Patient is a 59yo M with pmh of type 2 dm, htn, and mild seasonal asthma (never hospitalized for it, occasionally prescribed inhalers) presenting with one week of experiencing fevers/chills, dry cough, and generalized weakness, with shortness of breath over the last several days, found to be hypoxic to 82% on RA and course c/b DKA requiring tele admission. DKA now resolved and patient stable on NC, weaning to [facility]. 61 yo M w/ PMH of T2DM, HTN, and mild seasonal asthma (never hospitalized for it, occasionally prescribed inhalers) presenting with one week of experiencing fevers/chills, dry cough, and generalized weakness, with shortness of breath over the last several days, found to be hypoxic to 82% on RA and course c/b DKA requiring tele admission. DKA now resolved and patient stable on NC stepped down ovn to [facility].     SOCIAL:    2 daughter in healthcare field with +COVID  Lives with wife, asymptomatic currently

## 2020-04-10 NOTE — PROGRESS NOTE ADULT - PROBLEM SELECTOR PLAN 6
Pt with no reported history of hyperlipidemia or hypercholesterolemia but is prescribed fenofibrate 130mg to be taken daily. Given lack of insurance, has not been taking this medication for prolonged time  -Low utility in restarting medication if pt has not been on it, will hold off for now Pt with no reported history of hyperlipidemia or hypercholesterolemia but is prescribed fenofibrate 130mg to be taken daily. Given lack of insurance, has not been taking this medication for prolonged time  - Low utility in restarting medication if pt has not been on it, will hold off for now Pt with history of seasonal asthma, only requiring occasional albuterol inhaler use. Never intubated or hospitalized for asthma  - Prn albuterol inhaler

## 2020-04-10 NOTE — PROGRESS NOTE ADULT - SUBJECTIVE AND OBJECTIVE BOX
INTERVAL HPI/OVERNIGHT EVENTS:    Patient is a 60y old  Male who presents with a chief complaint of COVID (10 Apr 2020 10:48)      Pt reports the following symptoms:    CONSTITUTIONAL:  Negative fever or chills, feels well, good appetite  EYES:  Negative  blurry vision or double vision  CARDIOVASCULAR:  Negative for chest pain or palpitations  RESPIRATORY:  Negative for cough, wheezing, or SOB   GASTROINTESTINAL:  Negative for nausea, vomiting, diarrhea, constipation, or abdominal pain  GENITOURINARY:  Negative frequency, urgency or dysuria  NEUROLOGIC:  No headache, confusion, dizziness, lightheadedness    MEDICATIONS  (STANDING):  atorvastatin 40 milliGRAM(s) Oral at bedtime  dextrose 5%. 1000 milliLiter(s) (50 mL/Hr) IV Continuous <Continuous>  dextrose 50% Injectable 12.5 Gram(s) IV Push once  enoxaparin Injectable 70 milliGRAM(s) SubCutaneous every 12 hours  famotidine    Tablet 20 milliGRAM(s) Oral daily  insulin glargine Injectable (LANTUS) 35 Unit(s) SubCutaneous at bedtime  insulin lispro (HumaLOG) corrective regimen sliding scale   SubCutaneous Before meals and at bedtime  insulin lispro Injectable (HumaLOG) 10 Unit(s) SubCutaneous three times a day before meals  lisinopril 10 milliGRAM(s) Oral daily  propranolol 20 milliGRAM(s) Oral three times a day    MEDICATIONS  (PRN):  acetaminophen   Tablet .. 325 milliGRAM(s) Oral every 4 hours PRN Temp greater or equal to 38C (100.4F)  ALBUTerol    90 MICROgram(s) HFA Inhaler 2 Puff(s) Inhalation every 6 hours PRN Shortness of Breath  dextrose 40% Gel 15 Gram(s) Oral once PRN Blood Glucose LESS THAN 70 milliGRAM(s)/deciliter  glucagon  Injectable 1 milliGRAM(s) IntraMuscular once PRN Glucose LESS THAN 70 milligrams/deciliter  zolpidem 5 milliGRAM(s) Oral at bedtime PRN Insomnia      PHYSICAL EXAM  Vital Signs Last 24 Hrs  T(C): 36.7 (10 Apr 2020 11:41), Max: 36.7 (09 Apr 2020 14:18)  T(F): 98 (10 Apr 2020 11:41), Max: 98.1 (09 Apr 2020 14:18)  HR: 64 (10 Apr 2020 11:41) (60 - 78)  BP: 106/67 (10 Apr 2020 11:41) (96/62 - 133/68)  BP(mean): 94 (09 Apr 2020 17:05) (74 - 94)  RR: 24 (10 Apr 2020 11:41) (20 - 35)  SpO2: 91% (10 Apr 2020 11:41) (89% - 97%)    Constitutional: wn/wd in NAD.   HEENT: NCAT, MMM, OP clear, EOMI, no proptosis or lid retraction  Neck: no thyromegaly or palpable thyroid nodules   Respiratory: lungs CTAB.  Cardiovascular: regular rhythm, normal S1 and S2, no audible murmurs, no peripheral edema  GI: soft, NT/ND, no masses/HSM appreciated.  Neurology: no tremors, DTR 2+  Skin: no visible rashes/lesions  Psychiatric: AAO x 3, normal affect/mood.    LABS:                        15.5   5.75  )-----------( 504      ( 10 Apr 2020 07:32 )             46.3     04-10    139  |  102  |  13  ----------------------------<  77  4.2   |  26  |  0.52    Ca    8.9      10 Apr 2020 07:32  Phos  4.0     04-10  Mg     2.2     04-10    TPro  6.2  /  Alb  3.2<L>  /  TBili  0.5  /  DBili  x   /  AST  124<H>  /  ALT  69<H>  /  AlkPhos  78  04-10            HbA1C: 10.0 % (04-04 @ 10:24)    CAPILLARY BLOOD GLUCOSE      POCT Blood Glucose.: 87 mg/dL (10 Apr 2020 10:21)  POCT Blood Glucose.: 140 mg/dL (09 Apr 2020 21:31)  POCT Blood Glucose.: 138 mg/dL (09 Apr 2020 16:21) INTERVAL HPI/OVERNIGHT EVENTS:    Patient is a 60y old  Male who presents with a chief complaint of COVID (10 Apr 2020 10:48)  Insulin regimen and BG reviewed over the past 24 hours.   Pt has poor appetite.   Completed 5 days of solumedrol.   Pt noted to have coughing fits with acute decrease in O2 sat. Remains on 6L NC.   Plan to be d/c tomorrow to [facility].     Pt reports the following symptoms:    CONSTITUTIONAL:  Negative fever or chills, feels well, poor appetite  RESPIRATORY:  +cough    MEDICATIONS  (STANDING):  atorvastatin 40 milliGRAM(s) Oral at bedtime  dextrose 5%. 1000 milliLiter(s) (50 mL/Hr) IV Continuous <Continuous>  dextrose 50% Injectable 12.5 Gram(s) IV Push once  enoxaparin Injectable 70 milliGRAM(s) SubCutaneous every 12 hours  famotidine    Tablet 20 milliGRAM(s) Oral daily  insulin glargine Injectable (LANTUS) 35 Unit(s) SubCutaneous at bedtime  insulin lispro (HumaLOG) corrective regimen sliding scale   SubCutaneous Before meals and at bedtime  insulin lispro Injectable (HumaLOG) 10 Unit(s) SubCutaneous three times a day before meals  lisinopril 10 milliGRAM(s) Oral daily  propranolol 20 milliGRAM(s) Oral three times a day    MEDICATIONS  (PRN):  acetaminophen   Tablet .. 325 milliGRAM(s) Oral every 4 hours PRN Temp greater or equal to 38C (100.4F)  ALBUTerol    90 MICROgram(s) HFA Inhaler 2 Puff(s) Inhalation every 6 hours PRN Shortness of Breath  dextrose 40% Gel 15 Gram(s) Oral once PRN Blood Glucose LESS THAN 70 milliGRAM(s)/deciliter  glucagon  Injectable 1 milliGRAM(s) IntraMuscular once PRN Glucose LESS THAN 70 milligrams/deciliter  zolpidem 5 milliGRAM(s) Oral at bedtime PRN Insomnia      PHYSICAL EXAM  Vital Signs Last 24 Hrs  T(C): 36.7 (10 Apr 2020 11:41), Max: 36.7 (09 Apr 2020 14:18)  T(F): 98 (10 Apr 2020 11:41), Max: 98.1 (09 Apr 2020 14:18)  HR: 64 (10 Apr 2020 11:41) (60 - 78)  BP: 106/67 (10 Apr 2020 11:41) (96/62 - 133/68)  BP(mean): 94 (09 Apr 2020 17:05) (74 - 94)  RR: 24 (10 Apr 2020 11:41) (20 - 35)  SpO2: 91% (10 Apr 2020 11:41) (89% - 97%)    Due to the nature of this patient’s COVID-19 isolation status (either confirmed or suspected), this note was prepared without a bedside physical examination to prevent spread of infection and to conserve personal protective equipment during this nationwide pandemic. If possible, direct patient communication occurred via electronic measures or telephone conversation. Examination highlights were provided by a bedside nurse wearing personal protective equipment and review of pertinent medical records. Objective data (vital signs, urine output, lab results, imaging studies, medications, etc) were reviewed in detail. Face to face visits and physical examination have been limited only to patients for whom it is required for medical decision making.    LABS:                        15.5   5.75  )-----------( 504      ( 10 Apr 2020 07:32 )             46.3     04-10    139  |  102  |  13  ----------------------------<  77  4.2   |  26  |  0.52    Ca    8.9      10 Apr 2020 07:32  Phos  4.0     04-10  Mg     2.2     04-10    TPro  6.2  /  Alb  3.2<L>  /  TBili  0.5  /  DBili  x   /  AST  124<H>  /  ALT  69<H>  /  AlkPhos  78  04-10      HbA1C: 10.0 % (04-04 @ 10:24)    CAPILLARY BLOOD GLUCOSE  POCT Blood Glucose.: 87 mg/dL (10 Apr 2020 10:21)  POCT Blood Glucose.: 140 mg/dL (09 Apr 2020 21:31)  POCT Blood Glucose.: 138 mg/dL (09 Apr 2020 16:21)    A/P 60yMale with hx of DM presenting for management of COVID with improving glycemic control    1.  DM: type 2, uncontrolled,   Please decrease lantus to 30 units at bedtime  Continue lispro 10 TID with meals.   Continue lispro moderate dose scale with meals and at bedtime.   Continue consistent carb diet  FSG Goal 100-180    2.  Hyperlipidemia - goal LDL < 70  continue statin    For discharge:   can continue insulin, dose to be determined by clinical course  Pt is advised to follow up with Dr. Angelo after discharge by calling .

## 2020-04-10 NOTE — PROGRESS NOTE ADULT - PROBLEM SELECTOR PLAN 2
Patient with AG of 27 with ketones in urine. Lactate 2.0. No history of renal failure. Likely from DKA -- > now resolved  -basal bolus per endocrine Patient with AG of 27 with ketones in urine. Lactate 2.0. No history of renal failure. Likely from DKA -- > now resolved  - F/u Endocrine recs. Pt now completed course of steroids too. Discuss with them decreasing regimen.   - Currently, basal bolus per endocrine, lantus and ISS  - Monitor fingersticks and monitor for signs of hypoglycemia  - Hypoglycemic regimen PRN

## 2020-04-11 LAB
ALBUMIN SERPL ELPH-MCNC: 2.9 G/DL — LOW (ref 3.3–5)
ALP SERPL-CCNC: 91 U/L — SIGNIFICANT CHANGE UP (ref 40–120)
ALT FLD-CCNC: 138 U/L — HIGH (ref 10–45)
ANION GAP SERPL CALC-SCNC: 11 MMOL/L — SIGNIFICANT CHANGE UP (ref 5–17)
AST SERPL-CCNC: 169 U/L — HIGH (ref 10–40)
BASOPHILS # BLD AUTO: 0.01 K/UL — SIGNIFICANT CHANGE UP (ref 0–0.2)
BASOPHILS NFR BLD AUTO: 0.2 % — SIGNIFICANT CHANGE UP (ref 0–2)
BILIRUB SERPL-MCNC: 0.5 MG/DL — SIGNIFICANT CHANGE UP (ref 0.2–1.2)
BUN SERPL-MCNC: 12 MG/DL — SIGNIFICANT CHANGE UP (ref 7–23)
CALCIUM SERPL-MCNC: 8.6 MG/DL — SIGNIFICANT CHANGE UP (ref 8.4–10.5)
CHLORIDE SERPL-SCNC: 101 MMOL/L — SIGNIFICANT CHANGE UP (ref 96–108)
CO2 SERPL-SCNC: 23 MMOL/L — SIGNIFICANT CHANGE UP (ref 22–31)
CREAT SERPL-MCNC: 0.54 MG/DL — SIGNIFICANT CHANGE UP (ref 0.5–1.3)
CRP SERPL-MCNC: 0.74 MG/DL — HIGH (ref 0–0.4)
EOSINOPHIL # BLD AUTO: 0.11 K/UL — SIGNIFICANT CHANGE UP (ref 0–0.5)
EOSINOPHIL NFR BLD AUTO: 2.1 % — SIGNIFICANT CHANGE UP (ref 0–6)
FERRITIN SERPL-MCNC: 1498 NG/ML — HIGH (ref 30–400)
G6PD RBC-CCNC: 13.5 U/G HGB — SIGNIFICANT CHANGE UP (ref 7–20.5)
GLUCOSE BLDC GLUCOMTR-MCNC: 123 MG/DL — HIGH (ref 70–99)
GLUCOSE BLDC GLUCOMTR-MCNC: 229 MG/DL — HIGH (ref 70–99)
GLUCOSE BLDC GLUCOMTR-MCNC: 79 MG/DL — SIGNIFICANT CHANGE UP (ref 70–99)
GLUCOSE BLDC GLUCOMTR-MCNC: 82 MG/DL — SIGNIFICANT CHANGE UP (ref 70–99)
GLUCOSE SERPL-MCNC: 191 MG/DL — HIGH (ref 70–99)
HCT VFR BLD CALC: 48.7 % — SIGNIFICANT CHANGE UP (ref 39–50)
HGB BLD-MCNC: 15.7 G/DL — SIGNIFICANT CHANGE UP (ref 13–17)
IMM GRANULOCYTES NFR BLD AUTO: 0.8 % — SIGNIFICANT CHANGE UP (ref 0–1.5)
LYMPHOCYTES # BLD AUTO: 0.58 K/UL — LOW (ref 1–3.3)
LYMPHOCYTES # BLD AUTO: 11.1 % — LOW (ref 13–44)
MAGNESIUM SERPL-MCNC: 2.1 MG/DL — SIGNIFICANT CHANGE UP (ref 1.6–2.6)
MCHC RBC-ENTMCNC: 30.8 PG — SIGNIFICANT CHANGE UP (ref 27–34)
MCHC RBC-ENTMCNC: 32.2 GM/DL — SIGNIFICANT CHANGE UP (ref 32–36)
MCV RBC AUTO: 95.5 FL — SIGNIFICANT CHANGE UP (ref 80–100)
MONOCYTES # BLD AUTO: 0.15 K/UL — SIGNIFICANT CHANGE UP (ref 0–0.9)
MONOCYTES NFR BLD AUTO: 2.9 % — SIGNIFICANT CHANGE UP (ref 2–14)
NEUTROPHILS # BLD AUTO: 4.35 K/UL — SIGNIFICANT CHANGE UP (ref 1.8–7.4)
NEUTROPHILS NFR BLD AUTO: 82.9 % — HIGH (ref 43–77)
NRBC # BLD: 0 /100 WBCS — SIGNIFICANT CHANGE UP (ref 0–0)
PHOSPHATE SERPL-MCNC: 4.2 MG/DL — SIGNIFICANT CHANGE UP (ref 2.5–4.5)
PLATELET # BLD AUTO: 325 K/UL — SIGNIFICANT CHANGE UP (ref 150–400)
POTASSIUM SERPL-MCNC: 4.2 MMOL/L — SIGNIFICANT CHANGE UP (ref 3.5–5.3)
POTASSIUM SERPL-SCNC: 4.2 MMOL/L — SIGNIFICANT CHANGE UP (ref 3.5–5.3)
PROT SERPL-MCNC: 5.7 G/DL — LOW (ref 6–8.3)
RBC # BLD: 5.1 M/UL — SIGNIFICANT CHANGE UP (ref 4.2–5.8)
RBC # FLD: 12.7 % — SIGNIFICANT CHANGE UP (ref 10.3–14.5)
SODIUM SERPL-SCNC: 135 MMOL/L — SIGNIFICANT CHANGE UP (ref 135–145)
WBC # BLD: 5.24 K/UL — SIGNIFICANT CHANGE UP (ref 3.8–10.5)
WBC # FLD AUTO: 5.24 K/UL — SIGNIFICANT CHANGE UP (ref 3.8–10.5)

## 2020-04-11 PROCEDURE — 99233 SBSQ HOSP IP/OBS HIGH 50: CPT

## 2020-04-11 RX ORDER — INSULIN GLARGINE 100 [IU]/ML
26 INJECTION, SOLUTION SUBCUTANEOUS AT BEDTIME
Refills: 0 | Status: DISCONTINUED | OUTPATIENT
Start: 2020-04-11 | End: 2020-04-12

## 2020-04-11 RX ORDER — INSULIN LISPRO 100/ML
8 VIAL (ML) SUBCUTANEOUS
Refills: 0 | Status: DISCONTINUED | OUTPATIENT
Start: 2020-04-11 | End: 2020-04-12

## 2020-04-11 RX ADMIN — ENOXAPARIN SODIUM 70 MILLIGRAM(S): 100 INJECTION SUBCUTANEOUS at 06:23

## 2020-04-11 RX ADMIN — ZOLPIDEM TARTRATE 5 MILLIGRAM(S): 10 TABLET ORAL at 21:55

## 2020-04-11 RX ADMIN — Medication 10 UNIT(S): at 13:20

## 2020-04-11 RX ADMIN — ATORVASTATIN CALCIUM 40 MILLIGRAM(S): 80 TABLET, FILM COATED ORAL at 21:57

## 2020-04-11 RX ADMIN — Medication 10 UNIT(S): at 08:31

## 2020-04-11 RX ADMIN — INSULIN GLARGINE 26 UNIT(S): 100 INJECTION, SOLUTION SUBCUTANEOUS at 21:55

## 2020-04-11 RX ADMIN — ENOXAPARIN SODIUM 70 MILLIGRAM(S): 100 INJECTION SUBCUTANEOUS at 17:36

## 2020-04-11 RX ADMIN — Medication 4: at 21:54

## 2020-04-11 RX ADMIN — LISINOPRIL 10 MILLIGRAM(S): 2.5 TABLET ORAL at 06:22

## 2020-04-11 RX ADMIN — FAMOTIDINE 20 MILLIGRAM(S): 10 INJECTION INTRAVENOUS at 13:20

## 2020-04-11 NOTE — PROGRESS NOTE ADULT - ATTENDING COMMENTS
Comfortable on exam, not tachypneic  However O2 sat lower today - 89-92% on 6L NC  will continue to try to wean  will transfer to [facility] when >90% on < 6L    Inflammatory markers trending down  On therapeutic lovenox for elevated D dimer and persistent hypoxia - will continue until d/c then transition to 40mg daily or xarelto 10mg daily for 31 days Comfortable on exam, not tachypneic  However O2 sat lower today - 89-92% on 6L NC  will continue to try to wean  will transfer to [facility] when >90% on < 6L    Inflammatory markers trending down  On therapeutic lovenox for elevated D dimer and persistent hypoxia - will continue until d/c then transition to 5mg eliquis BID for at least 30 days, then re-evaluate with PCP

## 2020-04-11 NOTE — PROGRESS NOTE ADULT - PROBLEM SELECTOR PLAN 7
Pt with no reported history of hyperlipidemia or hypercholesterolemia but is prescribed fenofibrate 130mg to be taken daily. Given lack of insurance, has not been taking this medication for prolonged time  - Low utility in restarting medication if pt has not been on it, will hold off for now

## 2020-04-11 NOTE — PROGRESS NOTE ADULT - PROBLEM SELECTOR PLAN 8
F: none  E: Replete for K<4 and Mag <2  N: Diet Carb Consistent  A: Lovenox (full, pending venous duplex)  Dispo: [facility] monitoring oxygen saturations for today   Code: FULL CODE
F: none  E: Replete for K<4 and Mag <2  N: Diet Carb Consistent  A: Lovenox (full, pending venous duplex)  Dispo: [facility] and possibly [facility] tomorrow   Code: FULL CODE

## 2020-04-11 NOTE — PROGRESS NOTE ADULT - NSREFPHYEXINPTDOCREFER_GEN_ALL_CORE
Primary Team Progress Note

## 2020-04-11 NOTE — PROGRESS NOTE ADULT - PROBLEM SELECTOR PLAN 4
- Venous duplex today. Pt currently on full AC therapy given hypercoag and elevated admission inflammatory markers. Will transition to prophy dosing if able to wean O2 and venous duplex negative. - Venous duplex negative 4/10.  Pt currently on full AC therapy given hypercoag and elevated admission inflammatory markers. Will transition to prophy dosing if able to wean O2 .

## 2020-04-11 NOTE — PROGRESS NOTE ADULT - ASSESSMENT
59 yo M w/ PMH of T2DM, HTN, and mild seasonal asthma (never hospitalized for it, occasionally prescribed inhalers) presenting with one week of experiencing fevers/chills, dry cough, and generalized weakness, with shortness of breath over the last several days, found to be hypoxic to 82% on RA and course c/b DKA requiring tele admission. DKA now resolved and patient stable on NC stepped down ovn to [facility].     SOCIAL:    2 daughter in healthcare field with +COVID  Lives with wife, asymptomatic currently

## 2020-04-11 NOTE — PROGRESS NOTE ADULT - PROBLEM SELECTOR PLAN 2
Patient with AG of 27 with ketones in urine. Lactate 2.0. No history of renal failure. Likely from DKA -- > now resolved  - F/u Endocrine recs. Pt now completed course of steroids too. Discuss with them decreasing regimen.   - Currently, basal bolus per endocrine, lantus and ISS  - Monitor fingersticks and monitor for signs of hypoglycemia  - Hypoglycemic regimen PRN Patient with AG of 27 with ketones in urine. Lactate 2.0. No history of renal failure. Likely from DKA -- > now resolved  - F/u Endocrine recs. Pt now completed course of steroids too. Discuss with them decreasing regimen.   - Currently, basal bolus per endocrine, lantus and ISS  - Monitor fingersticks and monitor for signs of hypoglycemia  - Hypoglycemic regimen PRN  - monitor LFTs

## 2020-04-11 NOTE — PROGRESS NOTE ADULT - NSREFPHYEXREFTO_GEN_ALL_CORE
Inpatient Physical Exam

## 2020-04-11 NOTE — PROGRESS NOTE ADULT - SUBJECTIVE AND OBJECTIVE BOX
INTERVAL HPI/OVERNIGHT EVENTS:    Patient is a 60y old  Male who presents with a chief complaint of COVID (11 Apr 2020 13:34)      Pt reports the following symptoms:    CONSTITUTIONAL:  Negative fever or chills, feels well, good appetite  EYES:  Negative  blurry vision or double vision  CARDIOVASCULAR:  Negative for chest pain or palpitations  RESPIRATORY:  Negative for cough, wheezing, or SOB   GASTROINTESTINAL:  Negative for nausea, vomiting, diarrhea, constipation, or abdominal pain  GENITOURINARY:  Negative frequency, urgency or dysuria  NEUROLOGIC:  No headache, confusion, dizziness, lightheadedness    MEDICATIONS  (STANDING):  atorvastatin 40 milliGRAM(s) Oral at bedtime  dextrose 5%. 1000 milliLiter(s) (50 mL/Hr) IV Continuous <Continuous>  dextrose 50% Injectable 12.5 Gram(s) IV Push once  enoxaparin Injectable 70 milliGRAM(s) SubCutaneous every 12 hours  famotidine    Tablet 20 milliGRAM(s) Oral daily  insulin glargine Injectable (LANTUS) 26 Unit(s) SubCutaneous at bedtime  insulin lispro (HumaLOG) corrective regimen sliding scale   SubCutaneous Before meals and at bedtime  insulin lispro Injectable (HumaLOG) 8 Unit(s) SubCutaneous three times a day before meals  lisinopril 10 milliGRAM(s) Oral daily  propranolol 20 milliGRAM(s) Oral three times a day    MEDICATIONS  (PRN):  acetaminophen   Tablet .. 325 milliGRAM(s) Oral every 4 hours PRN Temp greater or equal to 38C (100.4F)  ALBUTerol    90 MICROgram(s) HFA Inhaler 2 Puff(s) Inhalation every 6 hours PRN Shortness of Breath  dextrose 40% Gel 15 Gram(s) Oral once PRN Blood Glucose LESS THAN 70 milliGRAM(s)/deciliter  glucagon  Injectable 1 milliGRAM(s) IntraMuscular once PRN Glucose LESS THAN 70 milligrams/deciliter  zolpidem 5 milliGRAM(s) Oral at bedtime PRN Insomnia      PHYSICAL EXAM  Vital Signs Last 24 Hrs  T(C): 36.7 (11 Apr 2020 11:30), Max: 36.7 (11 Apr 2020 11:30)  T(F): 98.1 (11 Apr 2020 11:30), Max: 98.1 (11 Apr 2020 11:30)  HR: 76 (11 Apr 2020 11:30) (72 - 76)  BP: 95/62 (11 Apr 2020 11:30) (95/62 - 111/74)  BP(mean): --  RR: 20 (11 Apr 2020 11:30) (18 - 20)  SpO2: 94% (11 Apr 2020 13:17) (90% - 94%)    Constitutional: wn/wd in NAD.   HEENT: NCAT, MMM, OP clear, EOMI, no proptosis or lid retraction  Neck: no thyromegaly or palpable thyroid nodules   Respiratory: lungs CTAB.  Cardiovascular: regular rhythm, normal S1 and S2, no audible murmurs, no peripheral edema  GI: soft, NT/ND, no masses/HSM appreciated.  Neurology: no tremors, DTR 2+  Skin: no visible rashes/lesions  Psychiatric: AAO x 3, normal affect/mood.    LABS:                        15.7   5.24  )-----------( 325      ( 11 Apr 2020 09:48 )             48.7     04-11    135  |  101  |  12  ----------------------------<  191<H>  4.2   |  23  |  0.54    Ca    8.6      11 Apr 2020 09:48  Phos  4.2     04-11  Mg     2.1     04-11    TPro  5.7<L>  /  Alb  2.9<L>  /  TBili  0.5  /  DBili  x   /  AST  169<H>  /  ALT  138<H>  /  AlkPhos  91  04-11            HbA1C: 10.0 % (04-04 @ 10:24)    CAPILLARY BLOOD GLUCOSE      POCT Blood Glucose.: 123 mg/dL (11 Apr 2020 17:30)  POCT Blood Glucose.: 79 mg/dL (11 Apr 2020 12:37)  POCT Blood Glucose.: 82 mg/dL (11 Apr 2020 08:24)  POCT Blood Glucose.: 94 mg/dL (10 Apr 2020 21:55)      Insulin Sliding Scale requirements X 24 Hours:    RADIOLOGY & ADDITIONAL TESTS:    A/P: 60y Male with history of DM type II presenting for       1.  DM -     Please continue           units lantus at bedtime  / in the morning and        units lispro with meals and lispro moderate / low dose sliding scale 4 times daily with meals and at bedtime.  Please continue consistent carbohydrate diet.      Goal FSG is   Will continue to monitor   For discharge, pt can continue    Pt can follow up at discharge with [facility] by calling  to make an appointment.   Will discuss case with     and update primary team INTERVAL HPI/OVERNIGHT EVENTS:    Patient is a 60y old  Male who presents with a chief complaint of COVID (11 Apr 2020 13:34)  No acute overnight events  pt eating well  insulin administration reviewed  remains on 6L NC  steroid administration reviewed - now off 48 hours.     Pt reports the following symptoms:    CONSTITUTIONAL:  Negative fever or chills, feels well, good appetite  EYES:  Negative  blurry vision or double vision  CARDIOVASCULAR:  Negative for chest pain or palpitations  RESPIRATORY:  Negative for cough, wheezing, or SOB   GASTROINTESTINAL:  Negative for nausea, vomiting, diarrhea, constipation, or abdominal pain  GENITOURINARY:  Negative frequency, urgency or dysuria  NEUROLOGIC:  No headache, confusion, dizziness, lightheadedness    MEDICATIONS  (STANDING):  atorvastatin 40 milliGRAM(s) Oral at bedtime  dextrose 5%. 1000 milliLiter(s) (50 mL/Hr) IV Continuous <Continuous>  dextrose 50% Injectable 12.5 Gram(s) IV Push once  enoxaparin Injectable 70 milliGRAM(s) SubCutaneous every 12 hours  famotidine    Tablet 20 milliGRAM(s) Oral daily  insulin glargine Injectable (LANTUS) 26 Unit(s) SubCutaneous at bedtime  insulin lispro (HumaLOG) corrective regimen sliding scale   SubCutaneous Before meals and at bedtime  insulin lispro Injectable (HumaLOG) 8 Unit(s) SubCutaneous three times a day before meals  lisinopril 10 milliGRAM(s) Oral daily  propranolol 20 milliGRAM(s) Oral three times a day    MEDICATIONS  (PRN):  acetaminophen   Tablet .. 325 milliGRAM(s) Oral every 4 hours PRN Temp greater or equal to 38C (100.4F)  ALBUTerol    90 MICROgram(s) HFA Inhaler 2 Puff(s) Inhalation every 6 hours PRN Shortness of Breath  dextrose 40% Gel 15 Gram(s) Oral once PRN Blood Glucose LESS THAN 70 milliGRAM(s)/deciliter  glucagon  Injectable 1 milliGRAM(s) IntraMuscular once PRN Glucose LESS THAN 70 milligrams/deciliter  zolpidem 5 milliGRAM(s) Oral at bedtime PRN Insomnia      PHYSICAL EXAM  Vital Signs Last 24 Hrs  T(C): 36.7 (11 Apr 2020 11:30), Max: 36.7 (11 Apr 2020 11:30)  T(F): 98.1 (11 Apr 2020 11:30), Max: 98.1 (11 Apr 2020 11:30)  HR: 76 (11 Apr 2020 11:30) (72 - 76)  BP: 95/62 (11 Apr 2020 11:30) (95/62 - 111/74)  BP(mean): --  RR: 20 (11 Apr 2020 11:30) (18 - 20)  SpO2: 94% (11 Apr 2020 13:17) (90% - 94%)    Due to the nature of this patient’s COVID-19 isolation status (either confirmed or suspected), this note was prepared without a bedside physical examination to prevent spread of infection and to conserve personal protective equipment during this nationwide pandemic. If possible, direct patient communication occurred via electronic measures or telephone conversation. Examination highlights were provided by a bedside nurse wearing personal protective equipment and review of pertinent medical records. Objective data (vital signs, urine output, lab results, imaging studies, medications, etc) were reviewed in detail. Face to face visits and physical examination have been limited only to patients for whom it is required for medical decision making.    LABS:                        15.7   5.24  )-----------( 325      ( 11 Apr 2020 09:48 )             48.7     04-11    135  |  101  |  12  ----------------------------<  191<H>  4.2   |  23  |  0.54    Ca    8.6      11 Apr 2020 09:48  Phos  4.2     04-11  Mg     2.1     04-11    TPro  5.7<L>  /  Alb  2.9<L>  /  TBili  0.5  /  DBili  x   /  AST  169<H>  /  ALT  138<H>  /  AlkPhos  91  04-11            HbA1C: 10.0 % (04-04 @ 10:24)    CAPILLARY BLOOD GLUCOSE      POCT Blood Glucose.: 123 mg/dL (11 Apr 2020 17:30)  POCT Blood Glucose.: 79 mg/dL (11 Apr 2020 12:37)  POCT Blood Glucose.: 82 mg/dL (11 Apr 2020 08:24)  POCT Blood Glucose.: 94 mg/dL (10 Apr 2020 21:55)      Insulin Sliding Scale requirements X 24 Hours:    RADIOLOGY & ADDITIONAL TESTS:    A/P: 60y Male with history of DM type II presenting for       1.  DM -     Please continue           units lantus at bedtime  / in the morning and        units lispro with meals and lispro moderate / low dose sliding scale 4 times daily with meals and at bedtime.  Please continue consistent carbohydrate diet.      Goal FSG is   Will continue to monitor   For discharge, pt can continue    Pt can follow up at discharge with [facility] by calling  to make an appointment.   Will discuss case with     and update primary team INTERVAL HPI/OVERNIGHT EVENTS:    Patient is a 60y old  Male who presents with a chief complaint of COVID (11 Apr 2020 13:34)  No acute overnight events  pt eating well  insulin administration reviewed  remains on 6L NC  steroid administration reviewed - now off 48 hours.     Pt reports the following symptoms:    CONSTITUTIONAL:  Negative fever or chills, feels well, good appetite  EYES:  Negative  blurry vision or double vision  CARDIOVASCULAR:  Negative for chest pain or palpitations  RESPIRATORY:  Negative for cough, wheezing, or SOB   GASTROINTESTINAL:  Negative for nausea, vomiting, diarrhea, constipation, or abdominal pain  GENITOURINARY:  Negative frequency, urgency or dysuria  NEUROLOGIC:  No headache, confusion, dizziness, lightheadedness    MEDICATIONS  (STANDING):  atorvastatin 40 milliGRAM(s) Oral at bedtime  dextrose 5%. 1000 milliLiter(s) (50 mL/Hr) IV Continuous <Continuous>  dextrose 50% Injectable 12.5 Gram(s) IV Push once  enoxaparin Injectable 70 milliGRAM(s) SubCutaneous every 12 hours  famotidine    Tablet 20 milliGRAM(s) Oral daily  insulin glargine Injectable (LANTUS) 26 Unit(s) SubCutaneous at bedtime  insulin lispro (HumaLOG) corrective regimen sliding scale   SubCutaneous Before meals and at bedtime  insulin lispro Injectable (HumaLOG) 8 Unit(s) SubCutaneous three times a day before meals  lisinopril 10 milliGRAM(s) Oral daily  propranolol 20 milliGRAM(s) Oral three times a day    MEDICATIONS  (PRN):  acetaminophen   Tablet .. 325 milliGRAM(s) Oral every 4 hours PRN Temp greater or equal to 38C (100.4F)  ALBUTerol    90 MICROgram(s) HFA Inhaler 2 Puff(s) Inhalation every 6 hours PRN Shortness of Breath  dextrose 40% Gel 15 Gram(s) Oral once PRN Blood Glucose LESS THAN 70 milliGRAM(s)/deciliter  glucagon  Injectable 1 milliGRAM(s) IntraMuscular once PRN Glucose LESS THAN 70 milligrams/deciliter  zolpidem 5 milliGRAM(s) Oral at bedtime PRN Insomnia      PHYSICAL EXAM  Vital Signs Last 24 Hrs  T(C): 36.7 (11 Apr 2020 11:30), Max: 36.7 (11 Apr 2020 11:30)  T(F): 98.1 (11 Apr 2020 11:30), Max: 98.1 (11 Apr 2020 11:30)  HR: 76 (11 Apr 2020 11:30) (72 - 76)  BP: 95/62 (11 Apr 2020 11:30) (95/62 - 111/74)  BP(mean): --  RR: 20 (11 Apr 2020 11:30) (18 - 20)  SpO2: 94% (11 Apr 2020 13:17) (90% - 94%)    Due to the nature of this patient’s COVID-19 isolation status (either confirmed or suspected), this note was prepared without a bedside physical examination to prevent spread of infection and to conserve personal protective equipment during this nationwide pandemic. If possible, direct patient communication occurred via electronic measures or telephone conversation. Examination highlights were provided by a bedside nurse wearing personal protective equipment and review of pertinent medical records. Objective data (vital signs, urine output, lab results, imaging studies, medications, etc) were reviewed in detail. Face to face visits and physical examination have been limited only to patients for whom it is required for medical decision making.    LABS:                        15.7   5.24  )-----------( 325      ( 11 Apr 2020 09:48 )             48.7     04-11    135  |  101  |  12  ----------------------------<  191<H>  4.2   |  23  |  0.54    Ca    8.6      11 Apr 2020 09:48  Phos  4.2     04-11  Mg     2.1     04-11    TPro  5.7<L>  /  Alb  2.9<L>  /  TBili  0.5  /  DBili  x   /  AST  169<H>  /  ALT  138<H>  /  AlkPhos  91  04-11            HbA1C: 10.0 % (04-04 @ 10:24)    CAPILLARY BLOOD GLUCOSE      POCT Blood Glucose.: 123 mg/dL (11 Apr 2020 17:30)  POCT Blood Glucose.: 79 mg/dL (11 Apr 2020 12:37)  POCT Blood Glucose.: 82 mg/dL (11 Apr 2020 08:24)  POCT Blood Glucose.: 94 mg/dL (10 Apr 2020 21:55)      Insulin Sliding Scale requirements X 24 Hours:    RADIOLOGY & ADDITIONAL TESTS:

## 2020-04-11 NOTE — PROGRESS NOTE ADULT - PROBLEM SELECTOR PLAN 1
Pt currently weaned 4/9 from venti (50%) to 6L NC, sat 93-97%.   - Isolation precautions; contact and airborne  - Trend COVID-19 labs (D-dimer and CRP downtrending, Ferritin now downtrending)  - now with saturations 89-90% at 6L NC   -will monitor closely for respiratory distress and need for NRB   - Albuterol MDI; avoid duonebs  - DUAL course completed 4/9 of hydroxychloroquine and azithromycin   - Also received Toci x1, and steroid course completed 4/9  - Tylenol 325mg q6 hours prn for fevers  - Encourage Is use, cough and deep breathing Pt currently weaned 4/9 from venti (50%) to 6L NC, sat 89-92%  - Isolation precautions; contact and airborne  - Trend COVID-19 labs (D-dimer and CRP downtrending, Ferritin now downtrending)  -will monitor closely for respiratory distress and need for NRB   - Albuterol MDI; avoid duonebs  - DUAL course completed 4/9 of hydroxychloroquine and azithromycin   - Also received Toci x1 on 4/4, and steroid course completed 4/9  - Tylenol 325mg q6 hours prn for fevers  - Encourage Is use, cough and deep breathing

## 2020-04-11 NOTE — PROGRESS NOTE ADULT - PROBLEM SELECTOR PLAN 5
Pt with known history of HTN. Should be on lisinopril and propranolol at home as prescribed but has not been taking given lack of insurance.  - Continue with prescribed propranolol 20mg TID and lisinopril 10mg daily  - SBP stable 90-130s, SBP stable 60-80s with HR 60-70s

## 2020-04-11 NOTE — PROGRESS NOTE ADULT - ATTENDING COMMENTS
A/P 60yMale with hx of DM presenting for management of COVID infection    1.  DM: type 2, improving control off steroids  lantus 26 at bedtime, lispro 8 units TID with meals.   Continue lispro moderate dose scale with meals and at bedtime.   Continue consistent carb diet  FSG Goal 100-180    2.  Hyperlipidemia - goal LDL < 70  continue atorvastatin 40mg daily    Pt is advised to follow up with me at discharge by calling .

## 2020-04-11 NOTE — PROGRESS NOTE ADULT - SUBJECTIVE AND OBJECTIVE BOX
1Medicine Progress Note    Patient is a 60y old  Male who presents with a chief complaint of COVID (10 Apr 2020 17:28)      SUBJECTIVE / OVERNIGHT EVENTS: NAEON    Denies SOB or cough this morning. appears comfortable white sitting up in bed. Denies CP, Abdominal pain nausea.     now with saturations 89-90% at 6L NC   ADDITIONAL REVIEW OF SYSTEMS:    MEDICATIONS  (STANDING):  atorvastatin 40 milliGRAM(s) Oral at bedtime  dextrose 5%. 1000 milliLiter(s) (50 mL/Hr) IV Continuous <Continuous>  dextrose 50% Injectable 12.5 Gram(s) IV Push once  enoxaparin Injectable 70 milliGRAM(s) SubCutaneous every 12 hours  famotidine    Tablet 20 milliGRAM(s) Oral daily  insulin glargine Injectable (LANTUS) 30 Unit(s) SubCutaneous at bedtime  insulin lispro (HumaLOG) corrective regimen sliding scale   SubCutaneous Before meals and at bedtime  insulin lispro Injectable (HumaLOG) 10 Unit(s) SubCutaneous three times a day before meals  lisinopril 10 milliGRAM(s) Oral daily  propranolol 20 milliGRAM(s) Oral three times a day    MEDICATIONS  (PRN):  acetaminophen   Tablet .. 325 milliGRAM(s) Oral every 4 hours PRN Temp greater or equal to 38C (100.4F)  ALBUTerol    90 MICROgram(s) HFA Inhaler 2 Puff(s) Inhalation every 6 hours PRN Shortness of Breath  dextrose 40% Gel 15 Gram(s) Oral once PRN Blood Glucose LESS THAN 70 milliGRAM(s)/deciliter  glucagon  Injectable 1 milliGRAM(s) IntraMuscular once PRN Glucose LESS THAN 70 milligrams/deciliter  zolpidem 5 milliGRAM(s) Oral at bedtime PRN Insomnia    CAPILLARY BLOOD GLUCOSE      POCT Blood Glucose.: 79 mg/dL (11 Apr 2020 12:37)  POCT Blood Glucose.: 82 mg/dL (11 Apr 2020 08:24)  POCT Blood Glucose.: 94 mg/dL (10 Apr 2020 21:55)  POCT Blood Glucose.: 151 mg/dL (10 Apr 2020 16:50)    I&O's Summary      PHYSICAL EXAM:  Vital Signs Last 24 Hrs  T(C): 36.7 (11 Apr 2020 11:30), Max: 36.7 (11 Apr 2020 11:30)  T(F): 98.1 (11 Apr 2020 11:30), Max: 98.1 (11 Apr 2020 11:30)  HR: 76 (11 Apr 2020 11:30) (72 - 76)  BP: 95/62 (11 Apr 2020 11:30) (95/62 - 111/74)  BP(mean): --  RR: 20 (11 Apr 2020 11:30) (18 - 20)  SpO2: 94% (11 Apr 2020 13:17) (90% - 94%)  CONSTITUTIONAL: NAD, well-developed, well-groomed  ENMT: Moist oral mucosa,  RESPIRATORY: Normal respiratory effort; crackles at bases   CARDIOVASCULAR: Regular rate and rhythm, normal S1 and S2, ; No lower extremity edema;   ABDOMEN: Nontender to palpation, normoactive bowel sounds, no rebound/guarding;   PSYCH: A+O to person, place, and time; affect appropriate  NEUROLOGY: CN 2-12 are intact and symmetric; no gross sensory deficits   SKIN: No rashes; no palpable lesions    LABS:                        15.7   5.24  )-----------( 325      ( 11 Apr 2020 09:48 )             48.7     04-11    135  |  101  |  12  ----------------------------<  191<H>  4.2   |  23  |  0.54    Ca    8.6      11 Apr 2020 09:48  Phos  4.2     04-11  Mg     2.1     04-11    TPro  5.7<L>  /  Alb  2.9<L>  /  TBili  0.5  /  DBili  x   /  AST  169<H>  /  ALT  138<H>  /  AlkPhos  91  04-11              COVID-19 PCR: Detected (04 Apr 2020 10:32)      RADIOLOGY & ADDITIONAL TESTS:  Imaging from Last 24 Hours:    Electrocardiogram/QTc Interval:    COORDINATION OF CARE:  Care Discussed with Consultants/Other Providers: Medicine Progress Note    Patient is a 60y old  Male who presents with a chief complaint of COVID (10 Apr 2020 17:28)      SUBJECTIVE / OVERNIGHT EVENTS: NAEON    Denies SOB or cough this morning. appears comfortable white sitting up in bed. Denies CP, Abdominal pain nausea.     now with saturations 89-90% at 6L NC   ADDITIONAL REVIEW OF SYSTEMS:    MEDICATIONS  (STANDING):  atorvastatin 40 milliGRAM(s) Oral at bedtime  dextrose 5%. 1000 milliLiter(s) (50 mL/Hr) IV Continuous <Continuous>  dextrose 50% Injectable 12.5 Gram(s) IV Push once  enoxaparin Injectable 70 milliGRAM(s) SubCutaneous every 12 hours  famotidine    Tablet 20 milliGRAM(s) Oral daily  insulin glargine Injectable (LANTUS) 30 Unit(s) SubCutaneous at bedtime  insulin lispro (HumaLOG) corrective regimen sliding scale   SubCutaneous Before meals and at bedtime  insulin lispro Injectable (HumaLOG) 10 Unit(s) SubCutaneous three times a day before meals  lisinopril 10 milliGRAM(s) Oral daily  propranolol 20 milliGRAM(s) Oral three times a day    MEDICATIONS  (PRN):  acetaminophen   Tablet .. 325 milliGRAM(s) Oral every 4 hours PRN Temp greater or equal to 38C (100.4F)  ALBUTerol    90 MICROgram(s) HFA Inhaler 2 Puff(s) Inhalation every 6 hours PRN Shortness of Breath  dextrose 40% Gel 15 Gram(s) Oral once PRN Blood Glucose LESS THAN 70 milliGRAM(s)/deciliter  glucagon  Injectable 1 milliGRAM(s) IntraMuscular once PRN Glucose LESS THAN 70 milligrams/deciliter  zolpidem 5 milliGRAM(s) Oral at bedtime PRN Insomnia    CAPILLARY BLOOD GLUCOSE      POCT Blood Glucose.: 79 mg/dL (11 Apr 2020 12:37)  POCT Blood Glucose.: 82 mg/dL (11 Apr 2020 08:24)  POCT Blood Glucose.: 94 mg/dL (10 Apr 2020 21:55)  POCT Blood Glucose.: 151 mg/dL (10 Apr 2020 16:50)    I&O's Summary      PHYSICAL EXAM:  Vital Signs Last 24 Hrs  T(C): 36.7 (11 Apr 2020 11:30), Max: 36.7 (11 Apr 2020 11:30)  T(F): 98.1 (11 Apr 2020 11:30), Max: 98.1 (11 Apr 2020 11:30)  HR: 76 (11 Apr 2020 11:30) (72 - 76)  BP: 95/62 (11 Apr 2020 11:30) (95/62 - 111/74)  BP(mean): --  RR: 20 (11 Apr 2020 11:30) (18 - 20)  SpO2: 94% (11 Apr 2020 13:17) (90% - 94%)  CONSTITUTIONAL: NAD, well-developed, well-groomed  ENMT: Moist oral mucosa,  RESPIRATORY: Normal respiratory effort; crackles at bases   CARDIOVASCULAR: Regular rate and rhythm, normal S1 and S2, ; No lower extremity edema;   ABDOMEN: Nontender to palpation, normoactive bowel sounds, no rebound/guarding;   PSYCH: A+O to person, place, and time; affect appropriate  NEUROLOGY: CN 2-12 are intact and symmetric; no gross sensory deficits   SKIN: No rashes; no palpable lesions    LABS:                        15.7   5.24  )-----------( 325      ( 11 Apr 2020 09:48 )             48.7     04-11    135  |  101  |  12  ----------------------------<  191<H>  4.2   |  23  |  0.54    Ca    8.6      11 Apr 2020 09:48  Phos  4.2     04-11  Mg     2.1     04-11    TPro  5.7<L>  /  Alb  2.9<L>  /  TBili  0.5  /  DBili  x   /  AST  169<H>  /  ALT  138<H>  /  AlkPhos  91  04-11    COVID-19 PCR: Detected (04 Apr 2020 10:32)      RADIOLOGY & ADDITIONAL TESTS:  Imaging from Last 24 Hours:    Electrocardiogram/QTc Interval:    COORDINATION OF CARE:  Care Discussed with Consultants/Other Providers:

## 2020-04-11 NOTE — PROGRESS NOTE ADULT - PROBLEM SELECTOR PLAN 6
Pt with history of seasonal asthma, only requiring occasional albuterol inhaler use. Never intubated or hospitalized for asthma  - Prn albuterol inhaler

## 2020-04-11 NOTE — PROGRESS NOTE ADULT - PROBLEM SELECTOR PLAN 3
Patient with known history of T2DM. On metformin, jardiance, and januvia at home. (A1c on admission 10.0)  - Found to be in DKA on the floors, since resolved. s/p Steroid course  - C/W regimen as above and discuss with endocrine changes  -blood sugars improved 4/11

## 2020-04-12 ENCOUNTER — TRANSCRIPTION ENCOUNTER (OUTPATIENT)
Age: 60
End: 2020-04-12

## 2020-04-12 VITALS — OXYGEN SATURATION: 94 %

## 2020-04-12 LAB
ALBUMIN SERPL ELPH-MCNC: 3.2 G/DL — LOW (ref 3.3–5)
ALP SERPL-CCNC: 104 U/L — SIGNIFICANT CHANGE UP (ref 40–120)
ALT FLD-CCNC: 128 U/L — HIGH (ref 10–45)
ANION GAP SERPL CALC-SCNC: 10 MMOL/L — SIGNIFICANT CHANGE UP (ref 5–17)
AST SERPL-CCNC: 97 U/L — HIGH (ref 10–40)
BASOPHILS # BLD AUTO: 0 K/UL — SIGNIFICANT CHANGE UP (ref 0–0.2)
BASOPHILS NFR BLD AUTO: 0 % — SIGNIFICANT CHANGE UP (ref 0–2)
BILIRUB SERPL-MCNC: 0.6 MG/DL — SIGNIFICANT CHANGE UP (ref 0.2–1.2)
BUN SERPL-MCNC: 10 MG/DL — SIGNIFICANT CHANGE UP (ref 7–23)
CALCIUM SERPL-MCNC: 8.7 MG/DL — SIGNIFICANT CHANGE UP (ref 8.4–10.5)
CHLORIDE SERPL-SCNC: 104 MMOL/L — SIGNIFICANT CHANGE UP (ref 96–108)
CO2 SERPL-SCNC: 25 MMOL/L — SIGNIFICANT CHANGE UP (ref 22–31)
CREAT SERPL-MCNC: 0.57 MG/DL — SIGNIFICANT CHANGE UP (ref 0.5–1.3)
CRP SERPL-MCNC: 0.49 MG/DL — HIGH (ref 0–0.4)
D DIMER BLD IA.RAPID-MCNC: 645 NG/ML DDU — HIGH
EOSINOPHIL # BLD AUTO: 0.14 K/UL — SIGNIFICANT CHANGE UP (ref 0–0.5)
EOSINOPHIL NFR BLD AUTO: 2.9 % — SIGNIFICANT CHANGE UP (ref 0–6)
FERRITIN SERPL-MCNC: 1308 NG/ML — HIGH (ref 30–400)
GLUCOSE BLDC GLUCOMTR-MCNC: 205 MG/DL — HIGH (ref 70–99)
GLUCOSE BLDC GLUCOMTR-MCNC: 92 MG/DL — SIGNIFICANT CHANGE UP (ref 70–99)
GLUCOSE SERPL-MCNC: 90 MG/DL — SIGNIFICANT CHANGE UP (ref 70–99)
HCT VFR BLD CALC: 45.2 % — SIGNIFICANT CHANGE UP (ref 39–50)
HGB BLD-MCNC: 15 G/DL — SIGNIFICANT CHANGE UP (ref 13–17)
IMM GRANULOCYTES NFR BLD AUTO: 0.6 % — SIGNIFICANT CHANGE UP (ref 0–1.5)
LYMPHOCYTES # BLD AUTO: 0.76 K/UL — LOW (ref 1–3.3)
LYMPHOCYTES # BLD AUTO: 15.7 % — SIGNIFICANT CHANGE UP (ref 13–44)
MAGNESIUM SERPL-MCNC: 2.3 MG/DL — SIGNIFICANT CHANGE UP (ref 1.6–2.6)
MCHC RBC-ENTMCNC: 31.1 PG — SIGNIFICANT CHANGE UP (ref 27–34)
MCHC RBC-ENTMCNC: 33.2 GM/DL — SIGNIFICANT CHANGE UP (ref 32–36)
MCV RBC AUTO: 93.8 FL — SIGNIFICANT CHANGE UP (ref 80–100)
MONOCYTES # BLD AUTO: 0.34 K/UL — SIGNIFICANT CHANGE UP (ref 0–0.9)
MONOCYTES NFR BLD AUTO: 7 % — SIGNIFICANT CHANGE UP (ref 2–14)
NEUTROPHILS # BLD AUTO: 3.58 K/UL — SIGNIFICANT CHANGE UP (ref 1.8–7.4)
NEUTROPHILS NFR BLD AUTO: 73.8 % — SIGNIFICANT CHANGE UP (ref 43–77)
NRBC # BLD: 0 /100 WBCS — SIGNIFICANT CHANGE UP (ref 0–0)
PHOSPHATE SERPL-MCNC: 3.7 MG/DL — SIGNIFICANT CHANGE UP (ref 2.5–4.5)
PLATELET # BLD AUTO: 406 K/UL — HIGH (ref 150–400)
POTASSIUM SERPL-MCNC: 4.4 MMOL/L — SIGNIFICANT CHANGE UP (ref 3.5–5.3)
POTASSIUM SERPL-SCNC: 4.4 MMOL/L — SIGNIFICANT CHANGE UP (ref 3.5–5.3)
PROT SERPL-MCNC: 6.1 G/DL — SIGNIFICANT CHANGE UP (ref 6–8.3)
RBC # BLD: 4.82 M/UL — SIGNIFICANT CHANGE UP (ref 4.2–5.8)
RBC # FLD: 12.7 % — SIGNIFICANT CHANGE UP (ref 10.3–14.5)
SODIUM SERPL-SCNC: 139 MMOL/L — SIGNIFICANT CHANGE UP (ref 135–145)
WBC # BLD: 4.85 K/UL — SIGNIFICANT CHANGE UP (ref 3.8–10.5)
WBC # FLD AUTO: 4.85 K/UL — SIGNIFICANT CHANGE UP (ref 3.8–10.5)

## 2020-04-12 PROCEDURE — 85379 FIBRIN DEGRADATION QUANT: CPT

## 2020-04-12 PROCEDURE — 80048 BASIC METABOLIC PNL TOTAL CA: CPT

## 2020-04-12 PROCEDURE — 87040 BLOOD CULTURE FOR BACTERIA: CPT

## 2020-04-12 PROCEDURE — 82330 ASSAY OF CALCIUM: CPT

## 2020-04-12 PROCEDURE — 87635 SARS-COV-2 COVID-19 AMP PRB: CPT

## 2020-04-12 PROCEDURE — 99238 HOSP IP/OBS DSCHRG MGMT 30/<: CPT

## 2020-04-12 PROCEDURE — 71045 X-RAY EXAM CHEST 1 VIEW: CPT

## 2020-04-12 PROCEDURE — 84145 PROCALCITONIN (PCT): CPT

## 2020-04-12 PROCEDURE — 84100 ASSAY OF PHOSPHORUS: CPT

## 2020-04-12 PROCEDURE — 83605 ASSAY OF LACTIC ACID: CPT

## 2020-04-12 PROCEDURE — 82010 KETONE BODYS QUAN: CPT

## 2020-04-12 PROCEDURE — 86480 TB TEST CELL IMMUN MEASURE: CPT

## 2020-04-12 PROCEDURE — 82728 ASSAY OF FERRITIN: CPT

## 2020-04-12 PROCEDURE — 36415 COLL VENOUS BLD VENIPUNCTURE: CPT

## 2020-04-12 PROCEDURE — 87389 HIV-1 AG W/HIV-1&-2 AB AG IA: CPT

## 2020-04-12 PROCEDURE — 80053 COMPREHEN METABOLIC PANEL: CPT

## 2020-04-12 PROCEDURE — 85610 PROTHROMBIN TIME: CPT

## 2020-04-12 PROCEDURE — 81003 URINALYSIS AUTO W/O SCOPE: CPT

## 2020-04-12 PROCEDURE — 99291 CRITICAL CARE FIRST HOUR: CPT | Mod: 25

## 2020-04-12 PROCEDURE — 93005 ELECTROCARDIOGRAM TRACING: CPT

## 2020-04-12 PROCEDURE — 84484 ASSAY OF TROPONIN QUANT: CPT

## 2020-04-12 PROCEDURE — 83036 HEMOGLOBIN GLYCOSYLATED A1C: CPT

## 2020-04-12 PROCEDURE — 83880 ASSAY OF NATRIURETIC PEPTIDE: CPT

## 2020-04-12 PROCEDURE — 85730 THROMBOPLASTIN TIME PARTIAL: CPT

## 2020-04-12 PROCEDURE — 82803 BLOOD GASES ANY COMBINATION: CPT

## 2020-04-12 PROCEDURE — 96374 THER/PROPH/DIAG INJ IV PUSH: CPT

## 2020-04-12 PROCEDURE — 82550 ASSAY OF CK (CPK): CPT

## 2020-04-12 PROCEDURE — 86803 HEPATITIS C AB TEST: CPT

## 2020-04-12 PROCEDURE — 87633 RESP VIRUS 12-25 TARGETS: CPT

## 2020-04-12 PROCEDURE — 83735 ASSAY OF MAGNESIUM: CPT

## 2020-04-12 PROCEDURE — 93970 EXTREMITY STUDY: CPT

## 2020-04-12 PROCEDURE — 80061 LIPID PANEL: CPT

## 2020-04-12 PROCEDURE — 82955 ASSAY OF G6PD ENZYME: CPT

## 2020-04-12 PROCEDURE — 84132 ASSAY OF SERUM POTASSIUM: CPT

## 2020-04-12 PROCEDURE — 82962 GLUCOSE BLOOD TEST: CPT

## 2020-04-12 PROCEDURE — 84295 ASSAY OF SERUM SODIUM: CPT

## 2020-04-12 PROCEDURE — 84681 ASSAY OF C-PEPTIDE: CPT

## 2020-04-12 PROCEDURE — 86140 C-REACTIVE PROTEIN: CPT

## 2020-04-12 PROCEDURE — 85025 COMPLETE CBC W/AUTO DIFF WBC: CPT

## 2020-04-12 RX ORDER — ALBUTEROL 90 UG/1
2 AEROSOL, METERED ORAL
Qty: 0 | Refills: 0 | DISCHARGE
Start: 2020-04-12

## 2020-04-12 RX ORDER — ATORVASTATIN CALCIUM 80 MG/1
1 TABLET, FILM COATED ORAL
Qty: 5 | Refills: 0
Start: 2020-04-12

## 2020-04-12 RX ORDER — INSULIN LISPRO 100/ML
8 VIAL (ML) SUBCUTANEOUS
Qty: 10 | Refills: 0
Start: 2020-04-12 | End: 2020-04-16

## 2020-04-12 RX ORDER — FENOFIBRATE,MICRONIZED 130 MG
1 CAPSULE ORAL
Qty: 5 | Refills: 0
Start: 2020-04-12 | End: 2020-04-16

## 2020-04-12 RX ORDER — LISINOPRIL 2.5 MG/1
1 TABLET ORAL
Qty: 5 | Refills: 0
Start: 2020-04-12

## 2020-04-12 RX ORDER — ACETAMINOPHEN 500 MG
1 TABLET ORAL
Qty: 30 | Refills: 0
Start: 2020-04-12 | End: 2020-04-16

## 2020-04-12 RX ORDER — LISINOPRIL 2.5 MG/1
1 TABLET ORAL
Qty: 5 | Refills: 0
Start: 2020-04-12 | End: 2020-04-16

## 2020-04-12 RX ORDER — APIXABAN 2.5 MG/1
1 TABLET, FILM COATED ORAL
Qty: 60 | Refills: 0
Start: 2020-04-12 | End: 2020-05-11

## 2020-04-12 RX ORDER — ACETAMINOPHEN 500 MG
1 TABLET ORAL
Qty: 0 | Refills: 0 | DISCHARGE
Start: 2020-04-12

## 2020-04-12 RX ORDER — ATORVASTATIN CALCIUM 80 MG/1
1 TABLET, FILM COATED ORAL
Qty: 0 | Refills: 0 | DISCHARGE
Start: 2020-04-12

## 2020-04-12 RX ORDER — PROPRANOLOL HCL 160 MG
1 CAPSULE, EXTENDED RELEASE 24HR ORAL
Qty: 5 | Refills: 0
Start: 2020-04-12

## 2020-04-12 RX ORDER — TRAZODONE HCL 50 MG
1 TABLET ORAL
Qty: 5 | Refills: 0
Start: 2020-04-12

## 2020-04-12 RX ORDER — FENOFIBRATE,MICRONIZED 130 MG
1 CAPSULE ORAL
Qty: 5 | Refills: 0
Start: 2020-04-12

## 2020-04-12 RX ORDER — INSULIN GLARGINE 100 [IU]/ML
26 INJECTION, SOLUTION SUBCUTANEOUS
Qty: 10 | Refills: 0
Start: 2020-04-12 | End: 2020-04-16

## 2020-04-12 RX ORDER — ATORVASTATIN CALCIUM 80 MG/1
1 TABLET, FILM COATED ORAL
Qty: 5 | Refills: 0
Start: 2020-04-12 | End: 2020-04-16

## 2020-04-12 RX ORDER — INSULIN LISPRO 100/ML
8 VIAL (ML) SUBCUTANEOUS
Qty: 15 | Refills: 0
Start: 2020-04-12 | End: 2020-04-16

## 2020-04-12 RX ORDER — ACETAMINOPHEN 500 MG
1 TABLET ORAL
Qty: 20 | Refills: 0
Start: 2020-04-12

## 2020-04-12 RX ORDER — PROPRANOLOL HCL 160 MG
1 CAPSULE, EXTENDED RELEASE 24HR ORAL
Qty: 15 | Refills: 0
Start: 2020-04-12 | End: 2020-04-16

## 2020-04-12 RX ORDER — APIXABAN 2.5 MG/1
1 TABLET, FILM COATED ORAL
Qty: 10 | Refills: 0
Start: 2020-04-12 | End: 2020-04-16

## 2020-04-12 RX ORDER — METFORMIN HYDROCHLORIDE 850 MG/1
1 TABLET ORAL
Qty: 10 | Refills: 0
Start: 2020-04-12 | End: 2020-04-16

## 2020-04-12 RX ORDER — ALBUTEROL 90 UG/1
2 AEROSOL, METERED ORAL
Qty: 1 | Refills: 0
Start: 2020-04-12

## 2020-04-12 RX ORDER — SITAGLIPTIN 50 MG/1
1 TABLET, FILM COATED ORAL
Qty: 5 | Refills: 0
Start: 2020-04-12 | End: 2020-04-16

## 2020-04-12 RX ORDER — APIXABAN 2.5 MG/1
1 TABLET, FILM COATED ORAL
Qty: 10 | Refills: 0
Start: 2020-04-12 | End: 2020-05-11

## 2020-04-12 RX ORDER — TRAZODONE HCL 50 MG
1 TABLET ORAL
Qty: 5 | Refills: 0
Start: 2020-04-12 | End: 2020-04-16

## 2020-04-12 RX ADMIN — LISINOPRIL 10 MILLIGRAM(S): 2.5 TABLET ORAL at 06:11

## 2020-04-12 RX ADMIN — FAMOTIDINE 20 MILLIGRAM(S): 10 INJECTION INTRAVENOUS at 13:19

## 2020-04-12 RX ADMIN — ENOXAPARIN SODIUM 70 MILLIGRAM(S): 100 INJECTION SUBCUTANEOUS at 06:08

## 2020-04-12 RX ADMIN — Medication 4: at 13:23

## 2020-04-12 RX ADMIN — Medication 8 UNIT(S): at 09:38

## 2020-04-12 RX ADMIN — Medication 8 UNIT(S): at 13:23

## 2020-04-12 NOTE — DISCHARGE NOTE NURSING/CASE MANAGEMENT/SOCIAL WORK - PATIENT PORTAL LINK FT
You can access the FollowMyHealth Patient Portal offered by [facility] by registering at the following website: [email or web address]. By joining [facility]’s FollowMyHealth portal, you will also be able to view your health information using other applications (apps) compatible with our system.

## 2020-04-12 NOTE — PROGRESS NOTE ADULT - SUBJECTIVE AND OBJECTIVE BOX
INTERVAL HPI/OVERNIGHT EVENTS:    Patient is a 60y old  Male who presents with a chief complaint of COVID (11 Apr 2020 19:02)      Pt reports the following symptoms:    CONSTITUTIONAL:  Negative fever or chills, feels well, good appetite  EYES:  Negative  blurry vision or double vision  CARDIOVASCULAR:  Negative for chest pain or palpitations  RESPIRATORY:  Negative for cough, wheezing, or SOB   GASTROINTESTINAL:  Negative for nausea, vomiting, diarrhea, constipation, or abdominal pain  GENITOURINARY:  Negative frequency, urgency or dysuria  NEUROLOGIC:  No headache, confusion, dizziness, lightheadedness    MEDICATIONS  (STANDING):  atorvastatin 40 milliGRAM(s) Oral at bedtime  dextrose 5%. 1000 milliLiter(s) (50 mL/Hr) IV Continuous <Continuous>  dextrose 50% Injectable 12.5 Gram(s) IV Push once  enoxaparin Injectable 70 milliGRAM(s) SubCutaneous every 12 hours  famotidine    Tablet 20 milliGRAM(s) Oral daily  insulin glargine Injectable (LANTUS) 26 Unit(s) SubCutaneous at bedtime  insulin lispro (HumaLOG) corrective regimen sliding scale   SubCutaneous Before meals and at bedtime  insulin lispro Injectable (HumaLOG) 8 Unit(s) SubCutaneous three times a day before meals  lisinopril 10 milliGRAM(s) Oral daily  propranolol 20 milliGRAM(s) Oral three times a day    MEDICATIONS  (PRN):  acetaminophen   Tablet .. 325 milliGRAM(s) Oral every 4 hours PRN Temp greater or equal to 38C (100.4F)  ALBUTerol    90 MICROgram(s) HFA Inhaler 2 Puff(s) Inhalation every 6 hours PRN Shortness of Breath  dextrose 40% Gel 15 Gram(s) Oral once PRN Blood Glucose LESS THAN 70 milliGRAM(s)/deciliter  glucagon  Injectable 1 milliGRAM(s) IntraMuscular once PRN Glucose LESS THAN 70 milligrams/deciliter  zolpidem 5 milliGRAM(s) Oral at bedtime PRN Insomnia      PHYSICAL EXAM  Vital Signs Last 24 Hrs  T(C): 36.6 (12 Apr 2020 10:20), Max: 36.7 (11 Apr 2020 20:52)  T(F): 97.9 (12 Apr 2020 10:20), Max: 98 (11 Apr 2020 20:52)  HR: 80 (12 Apr 2020 10:20) (74 - 80)  BP: 95/59 (12 Apr 2020 10:20) (95/59 - 133/84)  BP(mean): --  RR: 18 (12 Apr 2020 10:20) (18 - 20)  SpO2: 94% (12 Apr 2020 12:16) (94% - 98%)    Constitutional: wn/wd in NAD.   HEENT: NCAT, MMM, OP clear, EOMI, no proptosis or lid retraction  Neck: no thyromegaly or palpable thyroid nodules   Respiratory: lungs CTAB.  Cardiovascular: regular rhythm, normal S1 and S2, no audible murmurs, no peripheral edema  GI: soft, NT/ND, no masses/HSM appreciated.  Neurology: no tremors, DTR 2+  Skin: no visible rashes/lesions  Psychiatric: AAO x 3, normal affect/mood.    LABS:                        15.0   4.85  )-----------( 406      ( 12 Apr 2020 09:34 )             45.2     04-12    139  |  104  |  10  ----------------------------<  90  4.4   |  25  |  0.57    Ca    8.7      12 Apr 2020 09:34  Phos  3.7     04-12  Mg     2.3     04-12    TPro  6.1  /  Alb  3.2<L>  /  TBili  0.6  /  DBili  x   /  AST  97<H>  /  ALT  128<H>  /  AlkPhos  104  04-12            HbA1C: 10.0 % (04-04 @ 10:24)    CAPILLARY BLOOD GLUCOSE      POCT Blood Glucose.: 205 mg/dL (12 Apr 2020 13:02)  POCT Blood Glucose.: 92 mg/dL (12 Apr 2020 08:41)  POCT Blood Glucose.: 229 mg/dL (11 Apr 2020 21:08)      Insulin Sliding Scale requirements X 24 Hours:    RADIOLOGY & ADDITIONAL TESTS:    A/P: 60y Male with history of DM type II presenting for       1.  DM -     Please continue           units lantus at bedtime  / in the morning and        units lispro with meals and lispro moderate / low dose sliding scale 4 times daily with meals and at bedtime.  Please continue consistent carbohydrate diet.      Goal FSG is   Will continue to monitor   For discharge, pt can continue    Pt can follow up at discharge with [facility] by calling  to make an appointment.   Will discuss case with     and update primary team INTERVAL HPI/OVERNIGHT EVENTS:    Patient is a 60y old  Male who presents with a chief complaint of COVID (11 Apr 2020 19:02)  No acute overnight events  pt doing well  eating well  insulin administration reviewed.   planned for dc today.     Pt reports the following symptoms:    CONSTITUTIONAL:  Negative fever or chills, feels well, good appetite  EYES:  Negative  blurry vision or double vision  CARDIOVASCULAR:  Negative for chest pain or palpitations  RESPIRATORY:  Negative for cough, wheezing, or SOB   GASTROINTESTINAL:  Negative for nausea, vomiting, diarrhea, constipation, or abdominal pain  GENITOURINARY:  Negative frequency, urgency or dysuria  NEUROLOGIC:  No headache, confusion, dizziness, lightheadedness    MEDICATIONS  (STANDING):  atorvastatin 40 milliGRAM(s) Oral at bedtime  dextrose 5%. 1000 milliLiter(s) (50 mL/Hr) IV Continuous <Continuous>  dextrose 50% Injectable 12.5 Gram(s) IV Push once  enoxaparin Injectable 70 milliGRAM(s) SubCutaneous every 12 hours  famotidine    Tablet 20 milliGRAM(s) Oral daily  insulin glargine Injectable (LANTUS) 26 Unit(s) SubCutaneous at bedtime  insulin lispro (HumaLOG) corrective regimen sliding scale   SubCutaneous Before meals and at bedtime  insulin lispro Injectable (HumaLOG) 8 Unit(s) SubCutaneous three times a day before meals  lisinopril 10 milliGRAM(s) Oral daily  propranolol 20 milliGRAM(s) Oral three times a day    MEDICATIONS  (PRN):  acetaminophen   Tablet .. 325 milliGRAM(s) Oral every 4 hours PRN Temp greater or equal to 38C (100.4F)  ALBUTerol    90 MICROgram(s) HFA Inhaler 2 Puff(s) Inhalation every 6 hours PRN Shortness of Breath  dextrose 40% Gel 15 Gram(s) Oral once PRN Blood Glucose LESS THAN 70 milliGRAM(s)/deciliter  glucagon  Injectable 1 milliGRAM(s) IntraMuscular once PRN Glucose LESS THAN 70 milligrams/deciliter  zolpidem 5 milliGRAM(s) Oral at bedtime PRN Insomnia      PHYSICAL EXAM  Vital Signs Last 24 Hrs  T(C): 36.6 (12 Apr 2020 10:20), Max: 36.7 (11 Apr 2020 20:52)  T(F): 97.9 (12 Apr 2020 10:20), Max: 98 (11 Apr 2020 20:52)  HR: 80 (12 Apr 2020 10:20) (74 - 80)  BP: 95/59 (12 Apr 2020 10:20) (95/59 - 133/84)  BP(mean): --  RR: 18 (12 Apr 2020 10:20) (18 - 20)  SpO2: 94% (12 Apr 2020 12:16) (94% - 98%)    Due to the nature of this patient’s COVID-19 isolation status (either confirmed or suspected), this note was prepared without a bedside physical examination to prevent spread of infection and to conserve personal protective equipment during this nationwide pandemic. If possible, direct patient communication occurred via electronic measures or telephone conversation. Examination highlights were provided by a bedside nurse wearing personal protective equipment and review of pertinent medical records. Objective data (vital signs, urine output, lab results, imaging studies, medications, etc) were reviewed in detail. Face to face visits and physical examination have been limited only to patients for whom it is required for medical decision making.    LABS:                        15.0   4.85  )-----------( 406      ( 12 Apr 2020 09:34 )             45.2     04-12    139  |  104  |  10  ----------------------------<  90  4.4   |  25  |  0.57    Ca    8.7      12 Apr 2020 09:34  Phos  3.7     04-12  Mg     2.3     04-12    TPro  6.1  /  Alb  3.2<L>  /  TBili  0.6  /  DBili  x   /  AST  97<H>  /  ALT  128<H>  /  AlkPhos  104  04-12            HbA1C: 10.0 % (04-04 @ 10:24)    CAPILLARY BLOOD GLUCOSE      POCT Blood Glucose.: 205 mg/dL (12 Apr 2020 13:02)  POCT Blood Glucose.: 92 mg/dL (12 Apr 2020 08:41)  POCT Blood Glucose.: 229 mg/dL (11 Apr 2020 21:08)      Insulin Sliding Scale requirements X 24 Hours:    RADIOLOGY & ADDITIONAL TESTS:

## 2020-04-12 NOTE — PROGRESS NOTE ADULT - NSHPATTENDINGPLANDISCUSS_GEN_ALL_CORE
5-[facility] team, Dr. Johnson
5-[facility] team, Dr. Johnson
[place], Dr. Johnson
Dr. Elizabeth Chase
Dr. Johnson, Primary Team
Dr. Louis Chase
Dr. Sidney Urbina
[facility]

## 2020-04-12 NOTE — PROGRESS NOTE ADULT - ATTENDING COMMENTS
A/P 60yMale with hx of DM presenting for management of COVID infection    1.  DM: type 2, uncontrolled  lantus 26 at bedtime, lispro 8 TID with meals  Continue lispro moderate dose scale with meals and at bedtime.   Continue consistent carb diet  FSG Goal 100-180    2.  Hyperlipidemia - goal LDL < 70  continue atorvastatin 40mg daily    3.  Overweight - outpatient medical management.      For discharge:   insulin as above, resume metformin 500mg twice daily, januvia 100mg once daily    Pt is advised to follow up with me at discharge by calling .

## 2020-04-13 RX ORDER — GLIMEPIRIDE 1 MG
2 TABLET ORAL
Qty: 0 | Refills: 0 | DISCHARGE

## 2020-04-13 RX ORDER — PROPRANOLOL HCL 160 MG
1 CAPSULE, EXTENDED RELEASE 24HR ORAL
Qty: 0 | Refills: 0 | DISCHARGE

## 2020-04-13 RX ORDER — EMPAGLIFLOZIN 10 MG/1
1 TABLET, FILM COATED ORAL
Qty: 0 | Refills: 0 | DISCHARGE

## 2020-04-13 RX ORDER — LISINOPRIL 2.5 MG/1
1 TABLET ORAL
Qty: 0 | Refills: 0 | DISCHARGE

## 2020-04-13 RX ORDER — FENOFIBRATE,MICRONIZED 130 MG
1 CAPSULE ORAL
Qty: 0 | Refills: 0 | DISCHARGE

## 2020-04-13 RX ORDER — EMPAGLIFLOZIN 10 MG/1
0 TABLET, FILM COATED ORAL
Qty: 0 | Refills: 0 | DISCHARGE

## 2020-04-13 RX ORDER — METFORMIN HYDROCHLORIDE 850 MG/1
1 TABLET ORAL
Qty: 0 | Refills: 0 | DISCHARGE

## 2020-04-13 RX ORDER — GLIMEPIRIDE 1 MG
0 TABLET ORAL
Qty: 0 | Refills: 0 | DISCHARGE

## 2020-04-13 RX ORDER — SITAGLIPTIN 50 MG/1
1 TABLET, FILM COATED ORAL
Qty: 0 | Refills: 0 | DISCHARGE

## 2020-04-13 RX ORDER — METFORMIN HYDROCHLORIDE 850 MG/1
0 TABLET ORAL
Qty: 0 | Refills: 0 | DISCHARGE

## 2020-04-13 RX ORDER — TRAZODONE HCL 50 MG
0 TABLET ORAL
Qty: 0 | Refills: 0 | DISCHARGE

## 2020-04-20 ENCOUNTER — APPOINTMENT (OUTPATIENT)
Dept: UROLOGY | Facility: CLINIC | Age: 60
End: 2020-04-20

## 2020-04-30 ENCOUNTER — NON-APPOINTMENT (OUTPATIENT)
Age: 60
End: 2020-04-30

## 2020-06-30 PROBLEM — J45.909 UNSPECIFIED ASTHMA, UNCOMPLICATED: Chronic | Status: ACTIVE | Noted: 2020-04-04

## 2020-07-06 ENCOUNTER — APPOINTMENT (OUTPATIENT)
Dept: UROLOGY | Facility: CLINIC | Age: 60
End: 2020-07-06
Payer: COMMERCIAL

## 2020-07-06 VITALS
TEMPERATURE: 97.7 F | SYSTOLIC BLOOD PRESSURE: 119 MMHG | HEART RATE: 85 BPM | BODY MASS INDEX: 25.95 KG/M2 | WEIGHT: 152 LBS | DIASTOLIC BLOOD PRESSURE: 73 MMHG | HEIGHT: 64 IN

## 2020-07-06 PROCEDURE — 99213 OFFICE O/P EST LOW 20 MIN: CPT

## 2020-07-06 NOTE — ASSESSMENT
[FreeTextEntry1] : 59yo male with bothersome BPH symptoms, mostly obstructive\par s/p Urolift 6/04/19 \par Doing well, satisfied with voiding symptoms, off BPH meds\par F/u 6 months

## 2020-07-06 NOTE — HISTORY OF PRESENT ILLNESS
[FreeTextEntry1] : This is a pleasant 56yo male here for evaluation of lower urinary tract symptoms. He complains of nocturia, straining, frequency. Symptoms have been present for about 6 months. He was told his recent PSA was normal. He has had no prior treatment for BPH. Past history is significant for diabetes. \par \par 11/20/17 Here for f/u. Started on tamsulosin in September, BPH symptoms have markedly improved. \par \par 6/04/18 Here for f/u. No significant urinary complaints. Experiencing retrograde ejaculation, likely from tamsulosin use, not bothersome. Interested in discussing treatment options for ED. \par \par 1/09/19 Here for f/u. C/o sensation of incomplete emptying, double voiding. \par \par 4/17/19 Here for f/u. Voiding symptoms becoming more bothersome, mostly obstructive symptoms. \par \par 6/05/19 Underwent Urolift yesterday, failed TOV postop, here for catheter removal. \par \par 7/15/19 Here for f/u. Doing well. Had about 3 weeks of dysuria, urgency postop but now resolved and voiding symptoms satisfactory. \par \par 10/21/19 Here for f/u. Doing well, currently satisfied. \par \par 7/06/20 Here for f/u. Hospitalized in April with Covid pneumonia, mostly recovered now but still some SOB with activity. Voiding OK. More frequency when sugars are not controlled.  [Nocturia] : no nocturia [Weak Stream] : no weak stream [Straining] : no straining [None] : None

## 2020-07-06 NOTE — PHYSICAL EXAM
[General Appearance - Well Developed] : well developed [Normal Appearance] : normal appearance [General Appearance - Well Nourished] : well nourished [General Appearance - In No Acute Distress] : no acute distress [Abdomen Soft] : soft [Well Groomed] : well groomed [Abdomen Tenderness] : non-tender [Costovertebral Angle Tenderness] : no ~M costovertebral angle tenderness [Oriented To Time, Place, And Person] : oriented to person, place, and time [Mood] : the mood was normal [Normal Station and Gait] : the gait and station were normal for the patient's age [Not Anxious] : not anxious [Affect] : the affect was normal [No Focal Deficits] : no focal deficits

## 2020-07-06 NOTE — LETTER BODY
[Please see my note below.] : Please see my note below. [Dear  ___] : Dear  [unfilled], [Courtesy Letter:] : I had the pleasure of seeing your patient, [unfilled], in my office today. [Sincerely,] : Sincerely, [Consult Closing:] : Thank you very much for allowing me to participate in the care of this patient.  If you have any questions, please do not hesitate to contact me. [FreeTextEntry3] : Leonel Ortiz MD\par Urologic Oncology\par Department of Urology\par [facility]\par \par [employer] [facility] \par \par  [FreeTextEntry2] : Humphrey Wright MD\par [address]\[place] [address]

## 2020-10-01 ENCOUNTER — APPOINTMENT (OUTPATIENT)
Dept: NEUROLOGY | Facility: CLINIC | Age: 60
End: 2020-10-01
Payer: MEDICAID

## 2020-10-01 VITALS
HEIGHT: 64 IN | DIASTOLIC BLOOD PRESSURE: 87 MMHG | TEMPERATURE: 97.9 F | OXYGEN SATURATION: 97 % | WEIGHT: 157 LBS | HEART RATE: 80 BPM | BODY MASS INDEX: 26.8 KG/M2 | SYSTOLIC BLOOD PRESSURE: 142 MMHG

## 2020-10-01 DIAGNOSIS — R26.89 OTHER ABNORMALITIES OF GAIT AND MOBILITY: ICD-10-CM

## 2020-10-01 DIAGNOSIS — Z72.820 SLEEP DEPRIVATION: ICD-10-CM

## 2020-10-01 PROCEDURE — 99215 OFFICE O/P EST HI 40 MIN: CPT

## 2020-10-01 RX ORDER — TRAZODONE HYDROCHLORIDE 150 MG/1
150 TABLET ORAL
Qty: 30 | Refills: 2 | Status: ACTIVE | COMMUNITY
Start: 2017-05-31 | End: 1900-01-01

## 2020-10-01 NOTE — PHYSICAL EXAM
[FreeTextEntry1] : General:\par Constitutional:  Sitting comfortably in NAD.\par Psychiatric: well-groomed, appropriate affect, insight/judgment intact\par Ears, Nose, Throat: no abnormalities, mucus membranes moist\par Neck: supple\par Cardiovascular: regular rate and rhythm, normal S1/S2, no murmurs \par Chest: Clear to bases. 	Abdomen: soft, non-tender\par Extremities: no edema, clubbing or cyanosis. LEFT ARM ELBOW INJURY.\par Skin:  no rash or neurocutaneous signs \par \par Cognitive: Retention: MOCA TODAY 26/30. \par 3/3 at 5 minutes\par New date, day of week, and year. Knows number and address.  Knows all kids names and all grandchildren names. \par Orientation, language, memory and knowledge screens intact.\par Able to say numbers backwards and forwards. Knows numbers and able to name objects. Spelling normal. \par Cranial Nerves:\par II: Full to confrontation; disc margins sharp. III/IV/VI: PERRL EOMF No nystagmus\par VII: Face appears symmetric VIII: Normal to screening, IX/X: Palate Elevates Symmetrical  XI: Trapezius \par Symmetric  XII: Tongue midline\par DECREASED HEARING ON THE LEFT\par Motor:\par Power: 5/5 throughout, tone: normal x 4 limbs.FAST TREMOR OF RIGHT HAND and RESTING TREMOR NOTED ON BOTH HANDS R>L. \par Sensation:\par Intact to light touch. \par Coordination/Gait:\par Finger-nose-finger intact, normal rapid-alternating movements.\par Fine motor normal with normal rapid finger taps and heel-toe tapping \par Narrow based gait - DECREASED MOVEMENT OF RIGHT ARM, tandem forward and back ok\par Reflexes:\par DTR: TRACE REFLEXES symmetric x 4 limbs\par NEGATIVE ROMBERG

## 2020-10-01 NOTE — HISTORY OF PRESENT ILLNESS
[FreeTextEntry1] : 60 year old, right handed, male here today for evaluation for memory issues. Dr. Humphrey Wright referred him to neurology for memory loss. First visit. \par \par He had memory issues before having COVID but they are much worse after COVID. Things like he will not be able to remember what he ate for breakfast the day before.  \par \par He had COVID in April, he was here for three weeks and then went to [facility]. He is still short of breath but not as bad as it was in the past. There is also some dizziness. \par \par Also complains of headaches and is taking tylenol and naproxyn every day to help with the headaches.\par \cindi Also has an action tremor for the last five years. It does not happen at rest. The last three months he started having a hard time carrying stuff cause of the tremor. \par \par There is also dizziness and balance issues. He can only sleep for 3 hours during the night and takes ambien almost every night. He sleeps for 15 minutes on and off throughout the day. 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 \par \par Diabetes for thirty years.

## 2020-10-01 NOTE — ASU PATIENT PROFILE, ADULT - PSH
H/O colonoscopy    H/O esophagogastroduodenoscopy    S/P appendectomy    S/P knee surgery  arthroscopy  Surgery, elective  ankle ligament reconstruction
2 person assist

## 2020-10-01 NOTE — CONSULT LETTER
[Dear  ___] : Dear  [unfilled], [Consult Letter:] : I had the pleasure of evaluating your patient, [unfilled]. [( Thank you for referring [unfilled] for consultation for _____ )] : Thank you for referring [unfilled] for consultation for [unfilled] [Please see my note below.] : Please see my note below. [Consult Closing:] : Thank you very much for allowing me to participate in the care of this patient.  If you have any questions, please do not hesitate to contact me. [Sincerely,] : Sincerely, [FreeTextEntry1] : memory loss. [FreeTextEntry3] : Dr. Bandar Vasquez\par , Department of Neurology\par [facility]

## 2020-10-01 NOTE — DISCUSSION/SUMMARY
[FreeTextEntry1] : 60 year old male referred today for memory complaints. \par Upon exam had resting tremor of right hand and minimal left hand. Walking assessed with minimal right arm swinging. \par Trace reflexes of upper and lower extremities.\par MOCA 26/30.

## 2020-10-01 NOTE — ASSESSMENT
[FreeTextEntry1] : Check labs for inflammation and basic panel. \par Needs rEEG and MR brain noncontrast\par Start Magnesium glycinate 400 mg at bedtime \par Increase trazodone to 150 mg at bedtime from 100 mg at bedtime. \par Follow up after tests are complete. Call for worsening signs or symptoms.

## 2020-10-02 LAB
ALBUMIN SERPL ELPH-MCNC: 4.9 G/DL
ALP BLD-CCNC: 72 U/L
ALT SERPL-CCNC: 17 U/L
ANION GAP SERPL CALC-SCNC: 16 MMOL/L
AST SERPL-CCNC: 19 U/L
BASOPHILS # BLD AUTO: 0.02 K/UL
BASOPHILS NFR BLD AUTO: 0.4 %
BILIRUB SERPL-MCNC: 0.3 MG/DL
BUN SERPL-MCNC: 16 MG/DL
CALCIUM SERPL-MCNC: 9.9 MG/DL
CHLORIDE SERPL-SCNC: 101 MMOL/L
CO2 SERPL-SCNC: 22 MMOL/L
CREAT SERPL-MCNC: 0.7 MG/DL
CRP SERPL-MCNC: <0.1 MG/DL
EOSINOPHIL # BLD AUTO: 0.07 K/UL
EOSINOPHIL NFR BLD AUTO: 1.4 %
ERYTHROCYTE [SEDIMENTATION RATE] IN BLOOD BY WESTERGREN METHOD: 12 MM/HR
FERRITIN SERPL-MCNC: 160 NG/ML
FOLATE SERPL-MCNC: 14.1 NG/ML
GLUCOSE SERPL-MCNC: 156 MG/DL
HCT VFR BLD CALC: 43.3 %
HGB BLD-MCNC: 14.2 G/DL
IMM GRANULOCYTES NFR BLD AUTO: 0.2 %
LYMPHOCYTES # BLD AUTO: 1.73 K/UL
LYMPHOCYTES NFR BLD AUTO: 34.7 %
MAN DIFF?: NORMAL
MCHC RBC-ENTMCNC: 31.3 PG
MCHC RBC-ENTMCNC: 32.8 GM/DL
MCV RBC AUTO: 95.4 FL
MONOCYTES # BLD AUTO: 0.47 K/UL
MONOCYTES NFR BLD AUTO: 9.4 %
NEUTROPHILS # BLD AUTO: 2.68 K/UL
NEUTROPHILS NFR BLD AUTO: 53.9 %
PLATELET # BLD AUTO: 295 K/UL
POTASSIUM SERPL-SCNC: 4.1 MMOL/L
PROT SERPL-MCNC: 7.2 G/DL
RBC # BLD: 4.54 M/UL
RBC # FLD: 14 %
SODIUM SERPL-SCNC: 138 MMOL/L
TSH SERPL-ACNC: 1.44 UIU/ML
VIT B12 SERPL-MCNC: 612 PG/ML
WBC # FLD AUTO: 4.98 K/UL

## 2020-11-05 ENCOUNTER — APPOINTMENT (OUTPATIENT)
Dept: NEUROLOGY | Facility: CLINIC | Age: 60
End: 2020-11-05
Payer: MEDICAID

## 2020-11-05 VITALS
WEIGHT: 157 LBS | HEART RATE: 80 BPM | BODY MASS INDEX: 26.8 KG/M2 | DIASTOLIC BLOOD PRESSURE: 79 MMHG | SYSTOLIC BLOOD PRESSURE: 129 MMHG | OXYGEN SATURATION: 97 % | HEIGHT: 64 IN | TEMPERATURE: 98 F

## 2020-11-05 PROCEDURE — 99214 OFFICE O/P EST MOD 30 MIN: CPT

## 2020-11-05 PROCEDURE — 95819 EEG AWAKE AND ASLEEP: CPT

## 2020-11-05 NOTE — REVIEW OF SYSTEMS
[Sleep Disturbances] : sleep disturbances [As Noted in HPI] : as noted in HPI [Negative] : Respiratory

## 2020-11-05 NOTE — HISTORY OF PRESENT ILLNESS
[FreeTextEntry1] : Foolow up\par 60 year old, right handed, male here today for evaluation for memory issues. Dr. Humphrey Wright referred him to neurology for memory loss.\par \par He had memory issues before having COVID but they are much worse after COVID. Things like he will not be able to remember what he ate for breakfast the day before. \par \par He had COVID in April, he was here for three weeks and then went to [facility]. He is still short of breath but not as bad as it was in the past. There is also some dizziness. \par \par Also complains of headaches and is taking tylenol and naproxyn every day to help with the headaches.\par \par Also has an action tremor for the last five years. It does not happen at rest. The last three months he started having a hard time carrying stuff cause of the tremor. \par \par There is also dizziness and balance issues. He can only sleep for 3 hours during the night and takes ambien almost every night. He sleeps for 15 minutes on and off throughout the day. 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 \par \par Diabetes for thirty years. \par \par HPI\par Labs reviewed are normal.\par MRI: no intracranial pathology. Has severe Left tympanoinflammatory changes\par Pending EEG and neuropsychology\par \par No new problems. \par Sleeping better w up Trazodone 150 mg/day\par COVID April admitted x 3 weeks\par

## 2020-11-05 NOTE — DISCUSSION/SUMMARY
[FreeTextEntry1] : 60 year old male referred today for memory complaints. \par Upon exam had resting tremor of right hand and minimal left hand. Walking assessed with minimal right arm swinging. Maybe decrease facial blinking. \par Trace reflexes of upper and lower extremities.\par MOCA 26/30. \par Mild Parkinson ?

## 2020-11-19 ENCOUNTER — OUTPATIENT (OUTPATIENT)
Dept: OUTPATIENT SERVICES | Facility: HOSPITAL | Age: 60
LOS: 1 days | End: 2020-11-19
Payer: COMMERCIAL

## 2020-11-19 DIAGNOSIS — Z90.49 ACQUIRED ABSENCE OF OTHER SPECIFIED PARTS OF DIGESTIVE TRACT: Chronic | ICD-10-CM

## 2020-11-19 DIAGNOSIS — Z98.890 OTHER SPECIFIED POSTPROCEDURAL STATES: Chronic | ICD-10-CM

## 2020-11-19 DIAGNOSIS — Z41.9 ENCOUNTER FOR PROCEDURE FOR PURPOSES OTHER THAN REMEDYING HEALTH STATE, UNSPECIFIED: Chronic | ICD-10-CM

## 2020-11-19 PROCEDURE — 78803 RP LOCLZJ TUM SPECT 1 AREA: CPT

## 2020-11-19 PROCEDURE — 78803 RP LOCLZJ TUM SPECT 1 AREA: CPT | Mod: 26

## 2020-11-19 PROCEDURE — A9584: CPT

## 2021-01-04 ENCOUNTER — APPOINTMENT (OUTPATIENT)
Dept: UROLOGY | Facility: CLINIC | Age: 61
End: 2021-01-04
Payer: MEDICAID

## 2021-01-04 VITALS — HEART RATE: 84 BPM | TEMPERATURE: 97.4 F | SYSTOLIC BLOOD PRESSURE: 116 MMHG | DIASTOLIC BLOOD PRESSURE: 77 MMHG

## 2021-01-04 DIAGNOSIS — U07.1 COVID-19: ICD-10-CM

## 2021-01-04 DIAGNOSIS — Z86.39 PERSONAL HISTORY OF OTHER ENDOCRINE, NUTRITIONAL AND METABOLIC DISEASE: ICD-10-CM

## 2021-01-04 PROCEDURE — 99213 OFFICE O/P EST LOW 20 MIN: CPT

## 2021-01-04 PROCEDURE — 99072 ADDL SUPL MATRL&STAF TM PHE: CPT

## 2021-01-04 NOTE — LETTER BODY
[Dear  ___] : Dear  [unfilled], [Courtesy Letter:] : I had the pleasure of seeing your patient, [unfilled], in my office today. [Please see my note below.] : Please see my note below. [Consult Closing:] : Thank you very much for allowing me to participate in the care of this patient.  If you have any questions, please do not hesitate to contact me. [Sincerely,] : Sincerely, [FreeTextEntry2] : Humphrey Wright MD\par [address]\[place] [address]  [FreeTextEntry3] : Leonel Ortiz MD\par Urologic Oncology\par Department of Urology\par [facility]\par \par [employer] [facility] \par \par

## 2021-01-04 NOTE — REVIEW OF SYSTEMS
[Arthralgias] : arthralgias [Joint Pain] : joint pain [Confused] : confusion [Negative] : Genitourinary

## 2021-01-04 NOTE — ASSESSMENT
[FreeTextEntry1] : 61yo male with bothersome BPH symptoms, mostly obstructive\par s/p Urolift 6/04/19 \par Doing well, satisfied with voiding symptoms, off BPH meds\par F/u 12 months

## 2021-01-04 NOTE — HISTORY OF PRESENT ILLNESS
[FreeTextEntry1] : This is a pleasant 56yo male here for evaluation of lower urinary tract symptoms. He complains of nocturia, straining, frequency. Symptoms have been present for about 6 months. He was told his recent PSA was normal. He has had no prior treatment for BPH. Past history is significant for diabetes. \par \par 11/20/17 Here for f/u. Started on tamsulosin in September, BPH symptoms have markedly improved. \par \par 6/04/18 Here for f/u. No significant urinary complaints. Experiencing retrograde ejaculation, likely from tamsulosin use, not bothersome. Interested in discussing treatment options for ED. \par \par 1/09/19 Here for f/u. C/o sensation of incomplete emptying, double voiding. \par \par 4/17/19 Here for f/u. Voiding symptoms becoming more bothersome, mostly obstructive symptoms. \par \par 6/05/19 Underwent Urolift yesterday, failed TOV postop, here for catheter removal. \par \par 7/15/19 Here for f/u. Doing well. Had about 3 weeks of dysuria, urgency postop but now resolved and voiding symptoms satisfactory. \par \par 10/21/19 Here for f/u. Doing well, currently satisfied. \par \par 7/06/20 Here for f/u. Hospitalized in April with Covid pneumonia, mostly recovered now but still some SOB with activity. Voiding OK. More frequency when sugars are not controlled. \par \par 1/04/21 Here for f/u. Voiding well. Still suffering from side effects from Covid including memory problems, muscle aches, CHAPMAN.  [Nocturia] : no nocturia [Straining] : no straining [Weak Stream] : no weak stream [None] : None

## 2021-02-04 ENCOUNTER — APPOINTMENT (OUTPATIENT)
Dept: NEUROLOGY | Facility: CLINIC | Age: 61
End: 2021-02-04
Payer: MEDICAID

## 2021-02-04 VITALS
DIASTOLIC BLOOD PRESSURE: 79 MMHG | SYSTOLIC BLOOD PRESSURE: 124 MMHG | WEIGHT: 157 LBS | OXYGEN SATURATION: 96 % | BODY MASS INDEX: 26.8 KG/M2 | HEART RATE: 81 BPM | HEIGHT: 64 IN | TEMPERATURE: 97.7 F

## 2021-02-04 DIAGNOSIS — H91.92 UNSPECIFIED HEARING LOSS, LEFT EAR: ICD-10-CM

## 2021-02-04 DIAGNOSIS — G25.2 OTHER SPECIFIED FORMS OF TREMOR: ICD-10-CM

## 2021-02-04 PROCEDURE — 99214 OFFICE O/P EST MOD 30 MIN: CPT

## 2021-02-04 PROCEDURE — 99072 ADDL SUPL MATRL&STAF TM PHE: CPT

## 2021-02-04 RX ORDER — ZOLPIDEM TARTRATE 10 MG/1
10 TABLET ORAL
Refills: 0 | Status: DISCONTINUED | COMMUNITY
End: 2021-02-04

## 2021-02-04 NOTE — REVIEW OF SYSTEMS
[Feeling Tired] : feeling tired [Memory Lapses or Loss] : memory loss [Decr. Concentrating Ability] : decreased concentrating ability [Negative] : Genitourinary

## 2021-02-04 NOTE — PHYSICAL EXAM
[Person] : oriented to person [Place] : oriented to place [Time] : oriented to time [Concentration Intact] : normal concentrating ability [Visual Intact] : visual attention was ~T not ~L decreased [Naming Objects] : no difficulty naming common objects [Repeating Phrases] : no difficulty repeating a phrase [Writing A Sentence] : no difficulty writing a sentence [Fluency] : fluency intact [Comprehension] : comprehension intact [Reading] : reading intact [Past History] : adequate knowledge of personal past history [Cranial Nerves Optic (II)] : visual acuity intact bilaterally,  visual fields full to confrontation, pupils equal round and reactive to light [Cranial Nerves Oculomotor (III)] : extraocular motion intact [Cranial Nerves Trigeminal (V)] : facial sensation intact symmetrically [Cranial Nerves Facial (VII)] : face symmetrical [Cranial Nerves Glossopharyngeal (IX)] : tongue and palate midline [Cranial Nerves Accessory (XI - Cranial And Spinal)] : head turning and shoulder shrug symmetric [Cranial Nerves Hypoglossal (XII)] : there was no tongue deviation with protrusion [Motor Tone] : muscle tone was normal in all four extremities [Motor Strength] : muscle strength was normal in all four extremities [No Muscle Atrophy] : normal bulk in all four extremities [Motor Handedness Right-Handed] : the patient is right hand dominant [Sensation Tactile Decrease] : light touch was intact [Abnormal Walk] : normal gait [2+] : Ankle jerk left 2+ [Past-pointing] : there was no past-pointing [Tremor] : no tremor present [Plantar Reflex Right Only] : normal on the right [Plantar Reflex Left Only] : normal on the left [FreeTextEntry5] : Left hearing loss

## 2021-02-04 NOTE — DISCUSSION/SUMMARY
[FreeTextEntry1] : 61 year old male w memory complaints.  \par SAM scan normal but some decrease swing of arms. \par MOCA 26/30. \par EEG normal\par

## 2021-02-04 NOTE — HISTORY OF PRESENT ILLNESS
[FreeTextEntry1] : Follow up\par \par 61 year old, right handed, male here today for evaluation for memory issues. Dr. Humphrey Wright referred him to neurology for memory loss.\par \par He had memory issues before having COVID but they are much worse after COVID. Things like he will not be able to remember what he ate for breakfast the day before. \par \par He had COVID in April, he was here for three weeks and then went to [facility]. He is still short of breath but not as bad as it was in the past. There is also some dizziness. \par \par Also complains of headaches and is taking tylenol and naproxyn every day to help with the headaches.\par \par Also has an action tremor for the last five years. It does not happen at rest. The last three months he started having a hard time carrying stuff cause of the tremor. \par \par There is also dizziness and balance issues. He can only sleep for 3 hours during the night and takes ambien almost every night. He sleeps for 15 minutes on and off throughout the day. 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 \par \par Diabetes for thirty years. \par \par HPI\par Labs reviewed are normal.\par MRI: no intracranial pathology. Has severe Left tympani inflammatory changes\par EEG normal\par SAM scan normal . \par Sleeping better w up Trazodone 150 mg/day\par No major complaints. \par Sleeps well now\par

## 2021-03-25 ENCOUNTER — APPOINTMENT (OUTPATIENT)
Age: 61
End: 2021-03-25

## 2021-07-08 ENCOUNTER — APPOINTMENT (OUTPATIENT)
Dept: NEUROLOGY | Facility: CLINIC | Age: 61
End: 2021-07-08

## 2021-07-15 ENCOUNTER — APPOINTMENT (OUTPATIENT)
Dept: NEUROLOGY | Facility: CLINIC | Age: 61
End: 2021-07-15
Payer: MEDICAID

## 2021-07-15 VITALS
OXYGEN SATURATION: 96 % | HEIGHT: 64 IN | RESPIRATION RATE: 16 BRPM | SYSTOLIC BLOOD PRESSURE: 118 MMHG | DIASTOLIC BLOOD PRESSURE: 75 MMHG | TEMPERATURE: 98.1 F | BODY MASS INDEX: 26.8 KG/M2 | HEART RATE: 79 BPM | WEIGHT: 157 LBS

## 2021-07-15 DIAGNOSIS — R41.3 OTHER AMNESIA: ICD-10-CM

## 2021-07-15 PROCEDURE — 99214 OFFICE O/P EST MOD 30 MIN: CPT

## 2021-07-15 NOTE — ASSESSMENT
[FreeTextEntry1] : Neuropsychology test. Awaiting....\par Continue Trazodone 150 mg/day\par RTC 4 months.

## 2021-07-15 NOTE — DISCUSSION/SUMMARY
[FreeTextEntry1] : 61 year old male referred today for memory complaints. \par Trace reflexes of upper and lower extremities.\par MOCA 26/30. \par Mild Parkinson ?

## 2021-07-19 ENCOUNTER — APPOINTMENT (OUTPATIENT)
Dept: NEUROLOGY | Facility: CLINIC | Age: 61
End: 2021-07-19
Payer: MEDICAID

## 2021-07-19 PROCEDURE — 96137 PSYCL/NRPSYC TST PHY/QHP EA: CPT

## 2021-07-19 PROCEDURE — 96116 NUBHVL XM PHYS/QHP 1ST HR: CPT

## 2021-07-19 PROCEDURE — 96136 PSYCL/NRPSYC TST PHY/QHP 1ST: CPT

## 2021-07-19 PROCEDURE — 96133 NRPSYC TST EVAL PHYS/QHP EA: CPT

## 2021-07-19 PROCEDURE — 96132 NRPSYC TST EVAL PHYS/QHP 1ST: CPT

## 2021-08-04 ENCOUNTER — APPOINTMENT (OUTPATIENT)
Dept: NEUROLOGY | Facility: CLINIC | Age: 61
End: 2021-08-04
Payer: MEDICAID

## 2021-08-04 PROCEDURE — 96133 NRPSYC TST EVAL PHYS/QHP EA: CPT | Mod: 95

## 2021-08-11 NOTE — PROGRESS NOTE ADULT - PROBLEM/PLAN-1
Problem: Non-Pressure Injury Wound  Goal: # No deterioration in wound  Outcome: Outcome Met, Continue evaluating goal progress toward completion  Note: Pt seen by NP.  Lidocaine applied and wound debrided.  Wound stable at this time.  TX = Acetic Acid soak for 10-15 mins prior to treatment.  Santyl to areas of adhered fibrin along with Gentamicin to wound bed f/b Drawtex, ABD, and Kerlix.  Small piece of Mable to area of weeping on anterior distal ankle.  NP reviewed in length the importance of smoking cessation to improve blood flow and healing to the RLE.      Follow up with NP 9/8       DISPLAY PLAN FREE TEXT

## 2021-09-18 ENCOUNTER — OUTPATIENT (OUTPATIENT)
Dept: OUTPATIENT SERVICES | Facility: HOSPITAL | Age: 61
LOS: 1 days | End: 2021-09-18
Payer: MEDICAID

## 2021-09-18 ENCOUNTER — APPOINTMENT (OUTPATIENT)
Dept: CT IMAGING | Facility: HOSPITAL | Age: 61
End: 2021-09-18
Payer: MEDICAID

## 2021-09-18 DIAGNOSIS — Z98.890 OTHER SPECIFIED POSTPROCEDURAL STATES: Chronic | ICD-10-CM

## 2021-09-18 DIAGNOSIS — Z90.49 ACQUIRED ABSENCE OF OTHER SPECIFIED PARTS OF DIGESTIVE TRACT: Chronic | ICD-10-CM

## 2021-09-18 DIAGNOSIS — Z41.9 ENCOUNTER FOR PROCEDURE FOR PURPOSES OTHER THAN REMEDYING HEALTH STATE, UNSPECIFIED: Chronic | ICD-10-CM

## 2021-09-18 PROCEDURE — 70481 CT ORBIT/EAR/FOSSA W/DYE: CPT

## 2021-09-18 PROCEDURE — 70481 CT ORBIT/EAR/FOSSA W/DYE: CPT | Mod: 26

## 2021-10-07 ENCOUNTER — NON-APPOINTMENT (OUTPATIENT)
Age: 61
End: 2021-10-07

## 2021-10-13 ENCOUNTER — APPOINTMENT (OUTPATIENT)
Dept: OTOLARYNGOLOGY | Facility: CLINIC | Age: 61
End: 2021-10-13

## 2021-10-13 VITALS
WEIGHT: 157 LBS | TEMPERATURE: 97.2 F | HEIGHT: 64 IN | DIASTOLIC BLOOD PRESSURE: 74 MMHG | OXYGEN SATURATION: 97 % | SYSTOLIC BLOOD PRESSURE: 113 MMHG | HEART RATE: 84 BPM | BODY MASS INDEX: 26.8 KG/M2

## 2021-10-14 ENCOUNTER — APPOINTMENT (OUTPATIENT)
Dept: OTOLARYNGOLOGY | Facility: CLINIC | Age: 61
End: 2021-10-14
Payer: MEDICAID

## 2021-10-14 VITALS
WEIGHT: 158 LBS | BODY MASS INDEX: 26.98 KG/M2 | TEMPERATURE: 98 F | DIASTOLIC BLOOD PRESSURE: 74 MMHG | HEART RATE: 71 BPM | SYSTOLIC BLOOD PRESSURE: 112 MMHG | OXYGEN SATURATION: 98 % | HEIGHT: 64 IN

## 2021-10-14 DIAGNOSIS — Z83.3 FAMILY HISTORY OF DIABETES MELLITUS: ICD-10-CM

## 2021-10-14 DIAGNOSIS — Z87.891 PERSONAL HISTORY OF NICOTINE DEPENDENCE: ICD-10-CM

## 2021-10-14 DIAGNOSIS — Z78.9 OTHER SPECIFIED HEALTH STATUS: ICD-10-CM

## 2021-10-14 DIAGNOSIS — Z86.69 PERSONAL HISTORY OF OTHER DISEASES OF THE NERVOUS SYSTEM AND SENSE ORGANS: ICD-10-CM

## 2021-10-14 PROCEDURE — 92557 COMPREHENSIVE HEARING TEST: CPT

## 2021-10-14 PROCEDURE — 92550 TYMPANOMETRY & REFLEX THRESH: CPT

## 2021-10-14 PROCEDURE — 99204 OFFICE O/P NEW MOD 45 MIN: CPT

## 2021-10-14 RX ORDER — BLOOD SUGAR DIAGNOSTIC
STRIP MISCELLANEOUS
Qty: 100 | Refills: 0 | Status: ACTIVE | COMMUNITY
Start: 2021-07-22

## 2021-10-14 RX ORDER — ISOPROPYL ALCOHOL 70 ML/100ML
70 SWAB TOPICAL
Qty: 100 | Refills: 0 | Status: ACTIVE | COMMUNITY
Start: 2021-07-22

## 2021-10-14 NOTE — PHYSICAL EXAM
[de-identified] : r nl; l parsflaccida retracted into attic, drum medialized  [Normal] : assessment of respiratory effort is normal [de-identified] : gait steady

## 2021-10-14 NOTE — DATA REVIEWED
[de-identified] : l asymm mhl reviewed with pt type b tymp reviewed with pt [de-identified] : ct attic and mastoiid opacification, sclerosis, tmj l reviewed with pt

## 2021-10-14 NOTE — HISTORY OF PRESENT ILLNESS
[de-identified] : 62 yo M with DM had normal symmetric hearing until he developed COVID-19 infx last yr and had associated l ear infx and hl. He was given antibiotics (augmentin per Dr Sanchez's np) and flonase without help. He was found to have a l ear abnormal appearance and ct was ordered and he was referred here. No fh or sh relevant to cc. Denies draiange but has preauricular pain on l

## 2021-10-14 NOTE — ASSESSMENT
[FreeTextEntry1] : l cholesteatoma\par technique and pathology explained\par risks benefits and alts discussed\par explained preauricular pain is tmj\par he admits to grinding teeth soft diet wear mouth guard (he has it), see dentist\par taken to asst to schedule surgery

## 2021-11-09 ENCOUNTER — APPOINTMENT (OUTPATIENT)
Dept: NEUROLOGY | Facility: CLINIC | Age: 61
End: 2021-11-09

## 2021-11-14 LAB — SARS-COV-2 N GENE NPH QL NAA+PROBE: NOT DETECTED

## 2021-11-15 ENCOUNTER — TRANSCRIPTION ENCOUNTER (OUTPATIENT)
Age: 61
End: 2021-11-15

## 2021-11-16 ENCOUNTER — APPOINTMENT (OUTPATIENT)
Dept: OTOLARYNGOLOGY | Facility: AMBULATORY SURGERY CENTER | Age: 61
End: 2021-11-16

## 2021-11-16 ENCOUNTER — OUTPATIENT (OUTPATIENT)
Dept: OUTPATIENT SERVICES | Facility: HOSPITAL | Age: 61
LOS: 1 days | Discharge: HOME | End: 2021-11-16
Payer: COMMERCIAL

## 2021-11-16 ENCOUNTER — RESULT REVIEW (OUTPATIENT)
Age: 61
End: 2021-11-16

## 2021-11-16 DIAGNOSIS — Z41.9 ENCOUNTER FOR PROCEDURE FOR PURPOSES OTHER THAN REMEDYING HEALTH STATE, UNSPECIFIED: Chronic | ICD-10-CM

## 2021-11-16 DIAGNOSIS — Z98.890 OTHER SPECIFIED POSTPROCEDURAL STATES: Chronic | ICD-10-CM

## 2021-11-16 DIAGNOSIS — Z90.49 ACQUIRED ABSENCE OF OTHER SPECIFIED PARTS OF DIGESTIVE TRACT: Chronic | ICD-10-CM

## 2021-11-16 PROCEDURE — 69645 REVISE MIDDLE EAR & MASTOID: CPT | Mod: LT

## 2021-11-16 PROCEDURE — 88304 TISSUE EXAM BY PATHOLOGIST: CPT | Mod: 26

## 2021-11-18 ENCOUNTER — APPOINTMENT (OUTPATIENT)
Dept: NEUROLOGY | Facility: CLINIC | Age: 61
End: 2021-11-18
Payer: MEDICAID

## 2021-11-18 ENCOUNTER — APPOINTMENT (OUTPATIENT)
Dept: OTOLARYNGOLOGY | Facility: CLINIC | Age: 61
End: 2021-11-18
Payer: MEDICAID

## 2021-11-18 VITALS
TEMPERATURE: 97.8 F | SYSTOLIC BLOOD PRESSURE: 123 MMHG | WEIGHT: 156 LBS | BODY MASS INDEX: 26.63 KG/M2 | HEIGHT: 64 IN | DIASTOLIC BLOOD PRESSURE: 77 MMHG | HEART RATE: 76 BPM

## 2021-11-18 VITALS
OXYGEN SATURATION: 97 % | SYSTOLIC BLOOD PRESSURE: 133 MMHG | HEIGHT: 64 IN | WEIGHT: 158 LBS | BODY MASS INDEX: 26.98 KG/M2 | DIASTOLIC BLOOD PRESSURE: 84 MMHG | HEART RATE: 66 BPM | TEMPERATURE: 97.4 F

## 2021-11-18 PROCEDURE — 99212 OFFICE O/P EST SF 10 MIN: CPT

## 2021-11-18 PROCEDURE — 99024 POSTOP FOLLOW-UP VISIT: CPT

## 2021-11-18 RX ORDER — DOCUSATE SODIUM 100 MG/1
100 CAPSULE, LIQUID FILLED ORAL TWICE DAILY
Qty: 42 | Refills: 0 | Status: DISCONTINUED | COMMUNITY
Start: 2021-11-14 | End: 2021-11-18

## 2021-11-18 RX ORDER — MAGNESIUM GLYCINATE 100 MG
665 CAPSULE ORAL AT BEDTIME
Qty: 30 | Refills: 5 | Status: DISCONTINUED | COMMUNITY
Start: 2020-10-05 | End: 2021-11-18

## 2021-11-18 RX ORDER — CEFUROXIME AXETIL 500 MG/1
500 TABLET ORAL
Qty: 14 | Refills: 0 | Status: DISCONTINUED | COMMUNITY
Start: 2021-11-14 | End: 2021-11-18

## 2021-11-18 RX ORDER — RANITIDINE HYDROCHLORIDE 300 MG/1
300 CAPSULE ORAL
Refills: 0 | Status: DISCONTINUED | COMMUNITY
End: 2021-11-18

## 2021-11-18 RX ORDER — HYDROCODONE BITARTRATE AND ACETAMINOPHEN 5; 325 MG/1; MG/1
5-325 TABLET ORAL
Qty: 15 | Refills: 0 | Status: DISCONTINUED | COMMUNITY
Start: 2021-11-14 | End: 2021-11-18

## 2021-11-18 NOTE — ASSESSMENT
[FreeTextEntry1] : doing very well pod 2\par reassured\par dep\par continue changing cotton ball\par no heavy lifting 3 weeks\par rtc 1 week

## 2021-11-18 NOTE — PHYSICAL EXAM
[Normal] : external ears are normal bilaterally [de-identified] : dressing removed - ear healing well. cotton removed clot suctioned, new cotton placed. ointment placed on wound facial fn intact

## 2021-11-18 NOTE — HISTORY OF PRESENT ILLNESS
[de-identified] : 60 yo M pod 2 from l modified radical mastoidectomy and meatoplasty. He reports that he feels well. here to remove dressing, with his daughter. Operation described in detail. No c/o

## 2021-11-18 NOTE — DISCUSSION/SUMMARY
[FreeTextEntry1] : 61 year old male referred today for memory complaints. \par Left cholesteatoma\par Mood disorder \par

## 2021-11-24 ENCOUNTER — APPOINTMENT (OUTPATIENT)
Dept: OTOLARYNGOLOGY | Facility: CLINIC | Age: 61
End: 2021-11-24
Payer: MEDICAID

## 2021-11-24 VITALS
HEART RATE: 80 BPM | OXYGEN SATURATION: 96 % | SYSTOLIC BLOOD PRESSURE: 121 MMHG | TEMPERATURE: 98.2 F | DIASTOLIC BLOOD PRESSURE: 80 MMHG

## 2021-11-24 DIAGNOSIS — G25.2 OTHER SPECIFIED FORMS OF TREMOR: ICD-10-CM

## 2021-11-24 PROCEDURE — 99024 POSTOP FOLLOW-UP VISIT: CPT

## 2021-11-25 PROBLEM — G25.2 RESTING TREMOR: Status: ACTIVE | Noted: 2020-10-01

## 2021-11-25 NOTE — HISTORY OF PRESENT ILLNESS
[FreeTextEntry1] : Follow up\par \par 61 year old, right handed, male here today for evaluation for memory issues. Dr. Humphrey Wright referred him to neurology for memory loss.\par \par He had memory issues before having COVID but they are much worse after COVID. Things like he will not be able to remember what he ate for breakfast the day before. \par \par He had COVID in April, he was here for three weeks and then went to [facility]. He is still short of breath but not as bad as it was in the past. There is also some dizziness. \par \par Also complains of headaches and is taking tylenol and naproxyn every day to help with the headaches.\par \par Also has an action tremor for the last five years. It does not happen at rest. The last three months he started having a hard time carrying stuff cause of the tremor. \par \par There is also dizziness and balance issues. He can only sleep for 3 hours during the night and takes ambien almost every night. He sleeps for 15 minutes on and off throughout the day. 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 \par \par Diabetes for thirty years. \par \par HPI\par \par MRI: no intracranial pathology. Has severe Left tympani inflammatory changes\par EEG normal\par SAM scan normal . 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 No major cognitive changes. Mood disorder \par   [de-identified] : 62 yo m pod 8 l cwd mastoidectomy and meatoplasty. He reports that his ear is less painful and more comfortable than preop. still has some blood tinged drainage- reassured this is not unusual,. it is scant.

## 2021-11-25 NOTE — ASSESSMENT
[FreeTextEntry1] : 60 yo m doing well 8 d postop\par continue dep (indefinitely), no heavy lifting\par rtc 1 week\par questions answered

## 2021-11-25 NOTE — PHYSICAL EXAM
[Person] : oriented to person [Place] : oriented to place [Time] : oriented to time [Concentration Intact] : normal concentrating ability [Visual Intact] : visual attention was ~T not ~L decreased [Naming Objects] : no difficulty naming common objects [Repeating Phrases] : no difficulty repeating a phrase [Writing A Sentence] : no difficulty writing a sentence [Fluency] : fluency intact [Comprehension] : comprehension intact [Reading] : reading intact [Past History] : adequate knowledge of personal past history [Cranial Nerves Optic (II)] : visual acuity intact bilaterally,  visual fields full to confrontation, pupils equal round and reactive to light [Cranial Nerves Oculomotor (III)] : extraocular motion intact [Cranial Nerves Trigeminal (V)] : facial sensation intact symmetrically [Cranial Nerves Facial (VII)] : face symmetrical [Cranial Nerves Glossopharyngeal (IX)] : tongue and palate midline [Cranial Nerves Accessory (XI - Cranial And Spinal)] : head turning and shoulder shrug symmetric [Cranial Nerves Hypoglossal (XII)] : there was no tongue deviation with protrusion [Motor Tone] : muscle tone was normal in all four extremities [Motor Strength] : muscle strength was normal in all four extremities [No Muscle Atrophy] : normal bulk in all four extremities [Motor Handedness Right-Handed] : the patient is right hand dominant [Sensation Tactile Decrease] : light touch was intact [Abnormal Walk] : normal gait [2+] : Ankle jerk left 2+ [Past-pointing] : there was no past-pointing [Tremor] : no tremor present [Plantar Reflex Right Only] : normal on the right [Plantar Reflex Left Only] : normal on the left [FreeTextEntry5] : Left hearing loss [Normal] : external ears are normal bilaterally [de-identified] : healing well [de-identified] : r nl; l some clot suctioned lateral to tm healing well

## 2021-11-25 NOTE — REASON FOR VISIT
[Follow-Up: _____] : a [unfilled] follow-up visit [Subsequent Evaluation] : a subsequent evaluation for [FreeTextEntry2] : postop

## 2021-11-26 LAB — SURGICAL PATHOLOGY STUDY: SIGNIFICANT CHANGE UP

## 2021-12-02 ENCOUNTER — APPOINTMENT (OUTPATIENT)
Dept: OTOLARYNGOLOGY | Facility: CLINIC | Age: 61
End: 2021-12-02
Payer: MEDICAID

## 2021-12-02 VITALS
TEMPERATURE: 97.2 F | SYSTOLIC BLOOD PRESSURE: 135 MMHG | OXYGEN SATURATION: 98 % | HEART RATE: 91 BPM | DIASTOLIC BLOOD PRESSURE: 81 MMHG

## 2021-12-02 PROCEDURE — 99024 POSTOP FOLLOW-UP VISIT: CPT

## 2021-12-02 NOTE — ASSESSMENT
[FreeTextEntry1] : 2 weeks postop doing very well\par continue dep, no heavy lifting\par questions answered\par reassured\par rtc 1 week with  after cleaning ointment out

## 2021-12-02 NOTE — HISTORY OF PRESENT ILLNESS
[de-identified] : 60 yo m 2 weeks and 2 d postop l modified radical mastoidectomy for cholesteatoma. He c/o some discomfort over the graft site when he wears his eyeglasses. Still has scant drainage, red. -f.s/.c

## 2021-12-02 NOTE — PHYSICAL EXAM
[Normal] : external ears are normal bilaterally [de-identified] : r nl; l well healed cleaned out some clot under microscope - healing well - ointment on tm

## 2021-12-09 ENCOUNTER — APPOINTMENT (OUTPATIENT)
Dept: OTOLARYNGOLOGY | Facility: CLINIC | Age: 61
End: 2021-12-09
Payer: MEDICAID

## 2021-12-09 ENCOUNTER — NON-APPOINTMENT (OUTPATIENT)
Age: 61
End: 2021-12-09

## 2021-12-09 VITALS
SYSTOLIC BLOOD PRESSURE: 117 MMHG | HEART RATE: 74 BPM | HEIGHT: 64 IN | TEMPERATURE: 97.7 F | DIASTOLIC BLOOD PRESSURE: 72 MMHG | OXYGEN SATURATION: 97 % | WEIGHT: 156 LBS | BODY MASS INDEX: 26.63 KG/M2

## 2021-12-09 PROCEDURE — 99024 POSTOP FOLLOW-UP VISIT: CPT

## 2021-12-09 PROCEDURE — 92557 COMPREHENSIVE HEARING TEST: CPT

## 2021-12-10 NOTE — DATA REVIEWED
[de-identified] : unable to mask in L ear d/t bloody drainage,  showed mod SNHL in R, mod mixed HL in L ear, stable from Oct 2021, results reviewed with pt

## 2021-12-10 NOTE — ASSESSMENT
[FreeTextEntry1] : 1. s/p L cwd mastoidectomy\par -pt is doing well, reassured\par -continue dep\par -reassured and all questions answered\par - showed mod SNHL in R, mod mixed HL in L ear, stable from Oct 2021\par RTC 1 mo to monitor improvement

## 2021-12-10 NOTE — PHYSICAL EXAM
[de-identified] : rnl, L healing well, some ointment on TM, scant blood and ointment removed with suction atraumatically  [FreeTextEntry2] : facial n funciton nl [de-identified] : gait steady

## 2021-12-10 NOTE — HISTORY OF PRESENT ILLNESS
[de-identified] : 62 y/o M presenting for his 3 week postop L cwd mastoidectomy for cholesteatoma. He states he feels 50-60% better. He has some scant red drainage, denies fevers, chills, sweats.

## 2022-01-06 ENCOUNTER — APPOINTMENT (OUTPATIENT)
Dept: OTOLARYNGOLOGY | Facility: CLINIC | Age: 62
End: 2022-01-06
Payer: MEDICAID

## 2022-01-06 VITALS
HEIGHT: 64 IN | BODY MASS INDEX: 26.29 KG/M2 | WEIGHT: 154 LBS | DIASTOLIC BLOOD PRESSURE: 66 MMHG | OXYGEN SATURATION: 98 % | TEMPERATURE: 97.9 F | HEART RATE: 71 BPM | SYSTOLIC BLOOD PRESSURE: 100 MMHG

## 2022-01-06 DIAGNOSIS — H60.312 DIFFUSE OTITIS EXTERNA, LEFT EAR: ICD-10-CM

## 2022-01-06 PROCEDURE — 99024 POSTOP FOLLOW-UP VISIT: CPT

## 2022-01-06 NOTE — PHYSICAL EXAM
[Normal] : no rashes [de-identified] : R nl, L mild drainage and debris suctioned atraumatically, TM intact [de-identified] : R nl, L mild drainage and debris suctioned atraumatically, TM intact [de-identified] : gait steady

## 2022-01-06 NOTE — PROCEDURE
[] : Otitis Externa [Same] : same as the Pre Op Dx. [FreeTextEntry5] : L drainage suctioned atraumatically, TM intact

## 2022-01-06 NOTE — ASSESSMENT
[FreeTextEntry1] : 1. s/p L cwd mastoidectomy, L otitis externa\par -Levaquin, counseled pt on risk of tendon tear and to stop taking and call if he notices any new muscle pain, pt understands\par -dep\par -avoid Qtips\par RTC in 2 weeks to monitor sx improvement

## 2022-01-06 NOTE — HISTORY OF PRESENT ILLNESS
[de-identified] : 60 y/o M presenting for 7 wks postop L cwd mastoidectomy for cholesteatoma. He states his L ear has been draining for the past week. He is keeping it dry with cotton. Denies fevers, chills, sweats.

## 2022-01-20 ENCOUNTER — APPOINTMENT (OUTPATIENT)
Dept: OTOLARYNGOLOGY | Facility: CLINIC | Age: 62
End: 2022-01-20
Payer: MEDICAID

## 2022-01-20 VITALS
TEMPERATURE: 98.6 F | HEART RATE: 77 BPM | SYSTOLIC BLOOD PRESSURE: 106 MMHG | HEIGHT: 64 IN | DIASTOLIC BLOOD PRESSURE: 67 MMHG | OXYGEN SATURATION: 97 %

## 2022-01-20 PROCEDURE — 99024 POSTOP FOLLOW-UP VISIT: CPT

## 2022-01-20 NOTE — ASSESSMENT
[FreeTextEntry1] : doing well postop but still has very scant drainage - he showed mo on cotton ball - also interested in l hae\par ciprodex 1 week - given btl of generic\par rtc 2 weeks to recheck and then hae also if dry

## 2022-01-20 NOTE — HISTORY OF PRESENT ILLNESS
[de-identified] : 61 yo m >2 mo postop l cwd mastoidectomy. His drainage from the ear has improved a lot after taking levaquin; at present it is absolutely minimal

## 2022-01-20 NOTE — PHYSICAL EXAM
[de-identified] : r ok; l scant debris suctioned from mastoid cavity, agno3 [Normal] : assessment of respiratory effort is normal [de-identified] : gait steady

## 2022-02-02 ENCOUNTER — APPOINTMENT (OUTPATIENT)
Dept: UROLOGY | Facility: CLINIC | Age: 62
End: 2022-02-02
Payer: MEDICARE

## 2022-02-02 VITALS — DIASTOLIC BLOOD PRESSURE: 75 MMHG | SYSTOLIC BLOOD PRESSURE: 120 MMHG | TEMPERATURE: 97.8 F | HEART RATE: 87 BPM

## 2022-02-02 DIAGNOSIS — N52.9 MALE ERECTILE DYSFUNCTION, UNSPECIFIED: ICD-10-CM

## 2022-02-02 DIAGNOSIS — N40.1 BENIGN PROSTATIC HYPERPLASIA WITH LOWER URINARY TRACT SYMPMS: ICD-10-CM

## 2022-02-02 DIAGNOSIS — R35.1 BENIGN PROSTATIC HYPERPLASIA WITH LOWER URINARY TRACT SYMPMS: ICD-10-CM

## 2022-02-02 PROCEDURE — 99214 OFFICE O/P EST MOD 30 MIN: CPT

## 2022-02-02 RX ORDER — SILDENAFIL 50 MG/1
50 TABLET ORAL
Qty: 6 | Refills: 11 | Status: ACTIVE | COMMUNITY
Start: 2022-02-02 | End: 1900-01-01

## 2022-02-02 NOTE — ASSESSMENT
[FreeTextEntry1] : 63yo male with bothersome BPH symptoms, mostly obstructive\par s/p Urolift 6/04/19 \par Doing well, satisfied with voiding symptoms, off BPH meds\par \par Check PSA today\par \par ED, interested in trial of PDE-5i\par Trial of Viagra\par Reviewed side effects, proper usage \par \par F/u 1 year

## 2022-02-02 NOTE — HISTORY OF PRESENT ILLNESS
[FreeTextEntry1] : This is a pleasant 58yo male here for evaluation of lower urinary tract symptoms. He complains of nocturia, straining, frequency. Symptoms have been present for about 6 months. He was told his recent PSA was normal. He has had no prior treatment for BPH. Past history is significant for diabetes. \par \par 11/20/17 Here for f/u. Started on tamsulosin in September, BPH symptoms have markedly improved. \par \par 6/04/18 Here for f/u. No significant urinary complaints. Experiencing retrograde ejaculation, likely from tamsulosin use, not bothersome. Interested in discussing treatment options for ED. \par \par 1/09/19 Here for f/u. C/o sensation of incomplete emptying, double voiding. \par \par 4/17/19 Here for f/u. Voiding symptoms becoming more bothersome, mostly obstructive symptoms. \par \par 6/05/19 Underwent Urolift yesterday, failed TOV postop, here for catheter removal. \par \par 7/15/19 Here for f/u. Doing well. Had about 3 weeks of dysuria, urgency postop but now resolved and voiding symptoms satisfactory. \par \par 10/21/19 Here for f/u. Doing well, currently satisfied. \par \par 7/06/20 Here for f/u. Hospitalized in April with Covid pneumonia, mostly recovered now but still some SOB with activity. Voiding OK. More frequency when sugars are not controlled. \par \par 1/04/21 Here for f/u. Voiding well. Still suffering from side effects from Covid including memory problems, muscle aches, CHAPMAN. \par \par 2/02/22 Here for f/u. Voiding well overall, occasional "stinging" which happens about 1-2 times per week. Occasional intermittency, not bothersome. ED, interested in trying something.  [Nocturia] : no nocturia [Straining] : no straining [Weak Stream] : no weak stream [Intermittency] : intermittency [Erectile Dysfunction] : Erectile Dysfunction [None] : None

## 2022-02-02 NOTE — LETTER BODY
[Dear  ___] : Dear  [unfilled], [Courtesy Letter:] : I had the pleasure of seeing your patient, [unfilled], in my office today. [Please see my note below.] : Please see my note below. [Consult Closing:] : Thank you very much for allowing me to participate in the care of this patient.  If you have any questions, please do not hesitate to contact me. [Sincerely,] : Sincerely, [FreeTextEntry2] : Humphrey Wright MD\par [address]\[place] [address]  [FreeTextEntry3] : Leonel Ortiz MD\par Urologic Oncology\par Department of Urology\par [facility]\par \par [employer] [facility] \par \par

## 2022-02-03 ENCOUNTER — APPOINTMENT (OUTPATIENT)
Dept: PHARMACY | Facility: CLINIC | Age: 62
End: 2022-02-03
Payer: MEDICAID

## 2022-02-03 ENCOUNTER — APPOINTMENT (OUTPATIENT)
Dept: OTOLARYNGOLOGY | Facility: CLINIC | Age: 62
End: 2022-02-03
Payer: MEDICAID

## 2022-02-03 ENCOUNTER — NON-APPOINTMENT (OUTPATIENT)
Age: 62
End: 2022-02-03

## 2022-02-03 VITALS
DIASTOLIC BLOOD PRESSURE: 77 MMHG | SYSTOLIC BLOOD PRESSURE: 122 MMHG | HEART RATE: 85 BPM | TEMPERATURE: 97.9 F | OXYGEN SATURATION: 95 %

## 2022-02-03 LAB — PSA SERPL-MCNC: 0.51 NG/ML

## 2022-02-03 PROCEDURE — 92550 TYMPANOMETRY & REFLEX THRESH: CPT

## 2022-02-03 PROCEDURE — V5010 ASSESSMENT FOR HEARING AID: CPT

## 2022-02-03 PROCEDURE — 99024 POSTOP FOLLOW-UP VISIT: CPT

## 2022-02-03 PROCEDURE — 92557 COMPREHENSIVE HEARING TEST: CPT

## 2022-02-04 NOTE — PHYSICAL EXAM
[de-identified] : l cavity and eac clean and dry scant squamous debris removed with suciton [Normal] : no masses and lesions seen, face is symmetric [FreeTextEntry2] : VII intact

## 2022-02-04 NOTE — HISTORY OF PRESENT ILLNESS
[de-identified] : 61 yo M almost 3 mo postop l cwd mastoidectomy. c/o slight l temporal tenderness from area of tm graft and also scant liquid in ams only on arising; o.w it has dried up. He is interested in hae.

## 2022-02-04 NOTE — ASSESSMENT
[FreeTextEntry1] : doing well\par ear appears dry - reassured likely just needs a little more time\par ok proceed with HA\par rc 1 mo\par asked ot call sooner for any problems\par reassured temporal area appears nl- again - I think this just needs some more time

## 2022-03-03 ENCOUNTER — APPOINTMENT (OUTPATIENT)
Dept: OTOLARYNGOLOGY | Facility: CLINIC | Age: 62
End: 2022-03-03
Payer: MEDICAID

## 2022-03-03 ENCOUNTER — APPOINTMENT (OUTPATIENT)
Dept: PHARMACY | Facility: CLINIC | Age: 62
End: 2022-03-03

## 2022-03-03 ENCOUNTER — APPOINTMENT (OUTPATIENT)
Dept: PHARMACY | Facility: CLINIC | Age: 62
End: 2022-03-03
Payer: MEDICAID

## 2022-03-03 VITALS
OXYGEN SATURATION: 98 % | WEIGHT: 156 LBS | TEMPERATURE: 97.2 F | DIASTOLIC BLOOD PRESSURE: 74 MMHG | HEIGHT: 64 IN | HEART RATE: 94 BPM | SYSTOLIC BLOOD PRESSURE: 125 MMHG | BODY MASS INDEX: 26.63 KG/M2

## 2022-03-03 DIAGNOSIS — H61.21 IMPACTED CERUMEN, RIGHT EAR: ICD-10-CM

## 2022-03-03 PROCEDURE — 99213 OFFICE O/P EST LOW 20 MIN: CPT | Mod: 25

## 2022-03-03 PROCEDURE — V5261G: CUSTOM

## 2022-03-03 PROCEDURE — 69210 REMOVE IMPACTED EAR WAX UNI: CPT

## 2022-03-03 PROCEDURE — V5010 ASSESSMENT FOR HEARING AID: CPT

## 2022-03-03 NOTE — PHYSICAL EXAM
[de-identified] : r copious cerumen removed atraumatically with suction,tm nl; l cavity clean and dry, tm normal [Normal] : no neck adenopathy [de-identified] : gait steady

## 2022-03-03 NOTE — HISTORY OF PRESENT ILLNESS
[de-identified] : 63 yo m approx 4 mo postop l cwd mastoidectomy here to check ear. He feels drainage has stopped and pain has abated. Has persistent hl and is planning on getting his hearing aid today. R ear feels normal.

## 2022-03-03 NOTE — ASSESSMENT
[FreeTextEntry1] : cerumen removed AD\par l ear clean and dry no evidence of recurrence or infection\par reassured\par getting HA today\par rtc 4 mo\par

## 2022-03-03 NOTE — REASON FOR VISIT
[Subsequent Evaluation] : a subsequent evaluation for [FreeTextEntry2] : followup l cwd mastoidectomy, l chl

## 2022-03-15 ENCOUNTER — APPOINTMENT (OUTPATIENT)
Dept: NEUROLOGY | Facility: CLINIC | Age: 62
End: 2022-03-15
Payer: MEDICAID

## 2022-03-15 VITALS
BODY MASS INDEX: 26.46 KG/M2 | WEIGHT: 155 LBS | OXYGEN SATURATION: 98 % | HEIGHT: 64 IN | DIASTOLIC BLOOD PRESSURE: 74 MMHG | TEMPERATURE: 97.3 F | HEART RATE: 92 BPM | SYSTOLIC BLOOD PRESSURE: 119 MMHG

## 2022-03-15 DIAGNOSIS — H91.90 UNSPECIFIED HEARING LOSS, UNSPECIFIED EAR: ICD-10-CM

## 2022-03-15 PROCEDURE — 99214 OFFICE O/P EST MOD 30 MIN: CPT

## 2022-03-15 RX ORDER — CHOLESTYRAMINE 4 G/9G
4 POWDER, FOR SUSPENSION ORAL DAILY
Refills: 0 | Status: ACTIVE | COMMUNITY

## 2022-03-15 RX ORDER — POLYETHYLENE GLYCOL-3350 AND ELECTROLYTES 236; 6.74; 5.86; 2.97; 22.74 G/274.31G; G/274.31G; G/274.31G; G/274.31G; G/274.31G
236 POWDER, FOR SOLUTION ORAL
Qty: 4000 | Refills: 0 | Status: DISCONTINUED | COMMUNITY
Start: 2021-09-15 | End: 2022-03-15

## 2022-03-15 RX ORDER — ALBUTEROL SULFATE 90 UG/1
108 (90 BASE) INHALANT RESPIRATORY (INHALATION)
Refills: 0 | Status: ACTIVE | COMMUNITY
Start: 2021-09-15

## 2022-03-15 RX ORDER — CLOTRIMAZOLE AND BETAMETHASONE DIPROPIONATE 10; .5 MG/G; MG/G
1-0.05 CREAM TOPICAL TWICE DAILY
Refills: 0 | Status: ACTIVE | COMMUNITY
Start: 2021-09-15

## 2022-03-15 RX ORDER — HYALURONATE SOD, CROSS-LINKED 30 MG/3 ML
30 SYRINGE (ML) INTRAARTICULAR
Qty: 1 | Refills: 0 | Status: DISCONTINUED | OUTPATIENT
Start: 2018-05-08 | End: 2022-03-15

## 2022-03-15 RX ORDER — LEVOFLOXACIN 500 MG/1
500 TABLET, FILM COATED ORAL DAILY
Qty: 10 | Refills: 0 | Status: DISCONTINUED | COMMUNITY
Start: 2022-01-06 | End: 2022-03-15

## 2022-03-15 NOTE — HISTORY OF PRESENT ILLNESS
[FreeTextEntry1] : Follow up\par \par 62 year old, right handed, male here today for evaluation for memory issues. Dr. Humphrey Wright referred him to neurology for memory loss.\par \par He had memory issues before having COVID but they are much worse after COVID. Things like he will not be able to remember what he ate for breakfast the day before. \par \par He had COVID in April, he was here for three weeks and then went to [facility]. He is still short of breath but not as bad as it was in the past. There is also some dizziness. \par \par Also complains of headaches and is taking tylenol and naproxyn every day to help with the headaches.\par \par Also has an action tremor for the last five years. It does not happen at rest. The last three months he started having a hard time carrying stuff cause of the tremor. \par \par There is also dizziness and balance issues. He can only sleep for 3 hours during the night and takes ambien almost every night. He sleeps for 15 minutes on and off throughout the day. 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 \par \par Diabetes for thirty years. \par \par HPI\par \par MRI: no intracranial pathology. Has severe Left tympani inflammatory changes\par EEG normal\par SAM scan normal . 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 \par Vaccines x 3. \par \par

## 2022-03-15 NOTE — REVIEW OF SYSTEMS
[As Noted in HPI] : as noted in HPI [As noted in HPI] : as noted in HPI [Chest Pain] : chest pain [Negative] : Musculoskeletal

## 2022-03-15 NOTE — PHYSICAL EXAM
[Person] : oriented to person [Place] : oriented to place [Time] : oriented to time [Concentration Intact] : normal concentrating ability [Visual Intact] : visual attention was ~T not ~L decreased [Naming Objects] : no difficulty naming common objects [Repeating Phrases] : no difficulty repeating a phrase [Writing A Sentence] : no difficulty writing a sentence [Fluency] : fluency intact [Comprehension] : comprehension intact [Reading] : reading intact [Past History] : adequate knowledge of personal past history [Cranial Nerves Optic (II)] : visual acuity intact bilaterally,  visual fields full to confrontation, pupils equal round and reactive to light [Cranial Nerves Oculomotor (III)] : extraocular motion intact [Cranial Nerves Trigeminal (V)] : facial sensation intact symmetrically [Cranial Nerves Facial (VII)] : face symmetrical [Cranial Nerves Glossopharyngeal (IX)] : tongue and palate midline [Motor Tone] : muscle tone was normal in all four extremities [Motor Strength] : muscle strength was normal in all four extremities [No Muscle Atrophy] : normal bulk in all four extremities [Motor Handedness Right-Handed] : the patient is right hand dominant [Sensation Tactile Decrease] : light touch was intact [Abnormal Walk] : normal gait [2+] : Ankle jerk left 2+ [Past-pointing] : there was no past-pointing [Tremor] : no tremor present [Plantar Reflex Right Only] : normal on the right [Plantar Reflex Left Only] : normal on the left [FreeTextEntry5] : Left hearing loss 85% better

## 2022-03-31 ENCOUNTER — APPOINTMENT (OUTPATIENT)
Dept: PHARMACY | Facility: CLINIC | Age: 62
End: 2022-03-31
Payer: SELF-PAY

## 2022-03-31 PROCEDURE — V5299A: CUSTOM | Mod: NC

## 2022-06-02 ENCOUNTER — RX RENEWAL (OUTPATIENT)
Age: 62
End: 2022-06-02

## 2022-06-02 ENCOUNTER — APPOINTMENT (OUTPATIENT)
Dept: OTOLARYNGOLOGY | Facility: CLINIC | Age: 62
End: 2022-06-02
Payer: MEDICAID

## 2022-06-02 VITALS
HEIGHT: 64 IN | OXYGEN SATURATION: 100 % | BODY MASS INDEX: 26.46 KG/M2 | TEMPERATURE: 97.9 F | DIASTOLIC BLOOD PRESSURE: 67 MMHG | RESPIRATION RATE: 16 BRPM | SYSTOLIC BLOOD PRESSURE: 111 MMHG | WEIGHT: 155 LBS | HEART RATE: 80 BPM

## 2022-06-02 PROCEDURE — 99213 OFFICE O/P EST LOW 20 MIN: CPT

## 2022-06-02 NOTE — ASSESSMENT
[FreeTextEntry1] : s/p L cwd mastoidectomy approximately 9 months ago, doing very well\par -l cavity clean, dry, intact- no evidence of recurrence or infection\par -continue dep\par -CT in 3 mo to make sure he has no recurrence in non-visible place in office;\par RTC in 3 months with CT to review findings ; asked to call sooner for any issues

## 2022-06-02 NOTE — PHYSICAL EXAM
[Normal] : tympanic membranes are normal in both ears [de-identified] : R nl; L cavity clean and dry, TM nl  [de-identified] : gait steady

## 2022-06-02 NOTE — REASON FOR VISIT
[Subsequent Evaluation] : a subsequent evaluation for [FreeTextEntry2] : follow up l cwd mastoidectomy, l chl

## 2022-06-02 NOTE — HISTORY OF PRESENT ILLNESS
[de-identified] : 3 month follow up for this 61 y/o M approximately 9 months postop l cwd mastoidectomy here to check ear. Pt denies otalgia or drainage. He wears b HA and feels they are helpful. No other ENT complaints at this time.

## 2022-07-26 LAB
ALBUMIN SERPL ELPH-MCNC: 4.7 G/DL
ALP BLD-CCNC: 75 U/L
ALT SERPL-CCNC: 17 U/L
ANION GAP SERPL CALC-SCNC: 16 MMOL/L
AST SERPL-CCNC: 16 U/L
BASOPHILS # BLD AUTO: 0.03 K/UL
BASOPHILS NFR BLD AUTO: 0.6 %
BILIRUB SERPL-MCNC: 0.3 MG/DL
BUN SERPL-MCNC: 16 MG/DL
CALCIUM SERPL-MCNC: 9.9 MG/DL
CHLORIDE SERPL-SCNC: 101 MMOL/L
CO2 SERPL-SCNC: 21 MMOL/L
CREAT SERPL-MCNC: 0.86 MG/DL
CRP SERPL-MCNC: <0.1 MG/DL
EOSINOPHIL # BLD AUTO: 0.05 K/UL
EOSINOPHIL NFR BLD AUTO: 1 %
ERYTHROCYTE [SEDIMENTATION RATE] IN BLOOD BY WESTERGREN METHOD: 8 MM/HR
FERRITIN SERPL-MCNC: 149 NG/ML
FOLATE SERPL-MCNC: 13.1 NG/ML
GLUCOSE SERPL-MCNC: 251 MG/DL
HCT VFR BLD CALC: 43 %
HGB BLD-MCNC: 13.6 G/DL
IMM GRANULOCYTES NFR BLD AUTO: 0.4 %
LYMPHOCYTES # BLD AUTO: 2.05 K/UL
LYMPHOCYTES NFR BLD AUTO: 40 %
MAN DIFF?: NORMAL
MCHC RBC-ENTMCNC: 30.3 PG
MCHC RBC-ENTMCNC: 31.6 GM/DL
MCV RBC AUTO: 95.8 FL
MONOCYTES # BLD AUTO: 0.42 K/UL
MONOCYTES NFR BLD AUTO: 8.2 %
NEUTROPHILS # BLD AUTO: 2.56 K/UL
NEUTROPHILS NFR BLD AUTO: 49.8 %
PLATELET # BLD AUTO: 266 K/UL
POTASSIUM SERPL-SCNC: 4 MMOL/L
PROT SERPL-MCNC: 6.9 G/DL
RBC # BLD: 4.49 M/UL
RBC # FLD: 13.8 %
SODIUM SERPL-SCNC: 139 MMOL/L
TSH SERPL-ACNC: 1.28 UIU/ML
VIT B12 SERPL-MCNC: 571 PG/ML
WBC # FLD AUTO: 5.13 K/UL

## 2022-08-16 ENCOUNTER — APPOINTMENT (OUTPATIENT)
Dept: NEUROLOGY | Facility: CLINIC | Age: 62
End: 2022-08-16

## 2022-08-16 VITALS
DIASTOLIC BLOOD PRESSURE: 67 MMHG | SYSTOLIC BLOOD PRESSURE: 106 MMHG | OXYGEN SATURATION: 97 % | HEART RATE: 80 BPM | HEIGHT: 64 IN | WEIGHT: 154 LBS | BODY MASS INDEX: 26.29 KG/M2 | TEMPERATURE: 97.7 F

## 2022-08-16 DIAGNOSIS — G20 PARKINSON'S DISEASE: ICD-10-CM

## 2022-08-16 DIAGNOSIS — R42 DIZZINESS AND GIDDINESS: ICD-10-CM

## 2022-08-16 PROCEDURE — 99214 OFFICE O/P EST MOD 30 MIN: CPT

## 2022-08-16 NOTE — DISCUSSION/SUMMARY
[FreeTextEntry1] : 62 year old male referred today for memory complaints. \par Trace reflexes of upper and lower extremities.\par Memory 2/3\par

## 2022-08-16 NOTE — HISTORY OF PRESENT ILLNESS
[FreeTextEntry1] : Follow up\par \par 62 year old, right handed, male here today for evaluation for memory issues. Dr. Humphrey Wright referred him to neurology for memory loss.\par \par He had memory issues before having COVID but they are much worse after COVID. Things like he will not be able to remember what he ate for breakfast the day before. \par \par He had COVID in April, he was here for three weeks and then went to [facility]. He is still short of breath but not as bad as it was in the past. There is also some dizziness. \par \par Also complains of headaches and is taking tylenol and naproxyn every day to help with the headaches.\par \par Also has an action tremor for the last five years. It does not happen at rest. The last three months he started having a hard time carrying stuff cause of the tremor. \par \par There is also dizziness and balance issues. He can only sleep for 3 hours during the night and takes ambien almost every night. He sleeps for 15 minutes on and off throughout the day. 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 \par \par Diabetes for thirty years. \par \par \par HPI    8/16/22\par \par Doing well.\par Had ENT surgery\par Memory problems \par Sometimes he gets lost. \par R arm pain. Back pain\par \par \par HPI   3/15/22\par \par MRI: no intracranial pathology. Has severe Left tympani inflammatory changes\par EEG normal\par SAM scan normal . 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 \par Vaccines x 3. \par \par

## 2022-09-13 ENCOUNTER — APPOINTMENT (OUTPATIENT)
Dept: OTOLARYNGOLOGY | Facility: CLINIC | Age: 62
End: 2022-09-13

## 2022-09-13 VITALS
SYSTOLIC BLOOD PRESSURE: 107 MMHG | HEIGHT: 64 IN | OXYGEN SATURATION: 98 % | TEMPERATURE: 97.3 F | HEART RATE: 75 BPM | RESPIRATION RATE: 16 BRPM | BODY MASS INDEX: 26.29 KG/M2 | WEIGHT: 154 LBS | DIASTOLIC BLOOD PRESSURE: 70 MMHG

## 2022-09-13 PROCEDURE — 99213 OFFICE O/P EST LOW 20 MIN: CPT | Mod: 25

## 2022-09-13 PROCEDURE — 92557 COMPREHENSIVE HEARING TEST: CPT

## 2022-09-13 PROCEDURE — 69220 CLEAN OUT MASTOID CAVITY: CPT | Mod: LT

## 2022-09-13 PROCEDURE — 92567 TYMPANOMETRY: CPT

## 2022-09-13 NOTE — REASON FOR VISIT
[Subsequent Evaluation] : a subsequent evaluation for [FreeTextEntry2] : l cwd mastoidectomy, l chl

## 2022-09-13 NOTE — HISTORY OF PRESENT ILLNESS
[de-identified] : 3 month followup for this 63 y/o M s/p l cwd mastoidectomy and meatoplasty approximately 10 mo ago. He denies otalgia or drainage. He is here to review rpt CT. He feels his hearing may have decreased slightly.

## 2022-09-13 NOTE — PROCEDURE
[Same] : same as the Pre Op Dx. [] : Debridement of Mastoid [FreeTextEntry6] : l middle ear and mastoid debrided of squamous matl under microsocpe

## 2022-09-13 NOTE — PHYSICAL EXAM
[de-identified] : r nl; l cholesteatoma debrided from middle ear and mastoid cavity atraumatically under microsocpe- it went behind ossicles and could not be completely removed [Normal] : assessment of respiratory effort is normal [FreeTextEntry2] : VII intact b [de-identified] : gait steady

## 2022-09-13 NOTE — DATA REVIEWED
[de-identified] :  increased l ab gap, reviewed with pt [de-identified] : ct reviewed with pt report and images - looks like recurrent cholesteatoma in l attic. Radiologist said c/w scarring I called and explained looks like cholesteatoma.

## 2022-09-13 NOTE — ASSESSMENT
[FreeTextEntry1] : recurrent cholesteatoma\par discussed risks benefits and alts to revision modified radical mastoidectomy, left\par he wished to proceed\par scheduled

## 2022-09-21 ENCOUNTER — APPOINTMENT (OUTPATIENT)
Dept: OTOLARYNGOLOGY | Facility: CLINIC | Age: 62
End: 2022-09-21

## 2022-09-21 VITALS
WEIGHT: 154 LBS | HEIGHT: 64 IN | BODY MASS INDEX: 26.29 KG/M2 | DIASTOLIC BLOOD PRESSURE: 79 MMHG | HEART RATE: 65 BPM | SYSTOLIC BLOOD PRESSURE: 113 MMHG | TEMPERATURE: 97.3 F

## 2022-09-21 DIAGNOSIS — J32.0 CHRONIC MAXILLARY SINUSITIS: ICD-10-CM

## 2022-09-21 PROCEDURE — 99214 OFFICE O/P EST MOD 30 MIN: CPT

## 2022-09-21 NOTE — PHYSICAL EXAM
[de-identified] : r ok;l cholesteatoma [Normal] : assessment of respiratory effort is normal [de-identified] : gait steady

## 2022-09-21 NOTE — DATA REVIEWED
[de-identified] : ct reviewed with pt- l ds with spur chronic l max sinusitis with oroantral fistula, cholesteatoma - images and report reviewed.

## 2022-09-21 NOTE — HISTORY OF PRESENT ILLNESS
[de-identified] : 2 week followup appt for this 63 yo m with known recurrent cholesteatoma, already scheduled for ear surgery - had ct and possible chronic sinusitis was seen - sinus ct was ordered to evaluate more carefully; he had the ct and is here to review.

## 2022-09-21 NOTE — ASSESSMENT
[FreeTextEntry1] : l max sinusitis, chronic with oroantral fistula. explained to pt - recommended see omfs - he laurence he is treated at [facility]- given copy of his cd to take there asap; explained if they cannot do this I recommended [facility] and [facility]- maria eugeniat gave him their contact info; if persistys after omfs mgmt may need fess./smr\par ear surgery scheduled

## 2022-09-29 ENCOUNTER — APPOINTMENT (OUTPATIENT)
Dept: PHARMACY | Facility: CLINIC | Age: 62
End: 2022-09-29

## 2022-09-29 PROCEDURE — V5299A: CUSTOM | Mod: NC

## 2022-10-18 ENCOUNTER — EMERGENCY (EMERGENCY)
Facility: HOSPITAL | Age: 62
LOS: 1 days | Discharge: HOME | End: 2022-10-18
Admitting: EMERGENCY MEDICINE
Payer: MEDICARE

## 2022-10-18 VITALS
HEIGHT: 63 IN | HEART RATE: 90 BPM | DIASTOLIC BLOOD PRESSURE: 88 MMHG | RESPIRATION RATE: 20 BRPM | SYSTOLIC BLOOD PRESSURE: 149 MMHG | TEMPERATURE: 98 F | WEIGHT: 151.9 LBS | OXYGEN SATURATION: 97 %

## 2022-10-18 VITALS — OXYGEN SATURATION: 98 % | RESPIRATION RATE: 18 BRPM

## 2022-10-18 DIAGNOSIS — Z98.890 OTHER SPECIFIED POSTPROCEDURAL STATES: Chronic | ICD-10-CM

## 2022-10-18 DIAGNOSIS — Z90.49 ACQUIRED ABSENCE OF OTHER SPECIFIED PARTS OF DIGESTIVE TRACT: Chronic | ICD-10-CM

## 2022-10-18 DIAGNOSIS — Z79.4 LONG TERM (CURRENT) USE OF INSULIN: ICD-10-CM

## 2022-10-18 DIAGNOSIS — R05.1 ACUTE COUGH: ICD-10-CM

## 2022-10-18 DIAGNOSIS — I10 ESSENTIAL (PRIMARY) HYPERTENSION: ICD-10-CM

## 2022-10-18 DIAGNOSIS — N40.0 BENIGN PROSTATIC HYPERPLASIA WITHOUT LOWER URINARY TRACT SYMPTOMS: ICD-10-CM

## 2022-10-18 DIAGNOSIS — J45.909 UNSPECIFIED ASTHMA, UNCOMPLICATED: ICD-10-CM

## 2022-10-18 DIAGNOSIS — E11.9 TYPE 2 DIABETES MELLITUS WITHOUT COMPLICATIONS: ICD-10-CM

## 2022-10-18 DIAGNOSIS — R07.89 OTHER CHEST PAIN: ICD-10-CM

## 2022-10-18 DIAGNOSIS — E78.5 HYPERLIPIDEMIA, UNSPECIFIED: ICD-10-CM

## 2022-10-18 DIAGNOSIS — Z90.49 ACQUIRED ABSENCE OF OTHER SPECIFIED PARTS OF DIGESTIVE TRACT: ICD-10-CM

## 2022-10-18 DIAGNOSIS — N52.9 MALE ERECTILE DYSFUNCTION, UNSPECIFIED: ICD-10-CM

## 2022-10-18 DIAGNOSIS — Z79.01 LONG TERM (CURRENT) USE OF ANTICOAGULANTS: ICD-10-CM

## 2022-10-18 DIAGNOSIS — M19.90 UNSPECIFIED OSTEOARTHRITIS, UNSPECIFIED SITE: ICD-10-CM

## 2022-10-18 DIAGNOSIS — F32.A DEPRESSION, UNSPECIFIED: ICD-10-CM

## 2022-10-18 DIAGNOSIS — K21.9 GASTRO-ESOPHAGEAL REFLUX DISEASE WITHOUT ESOPHAGITIS: ICD-10-CM

## 2022-10-18 DIAGNOSIS — R50.9 FEVER, UNSPECIFIED: ICD-10-CM

## 2022-10-18 DIAGNOSIS — Z79.84 LONG TERM (CURRENT) USE OF ORAL HYPOGLYCEMIC DRUGS: ICD-10-CM

## 2022-10-18 DIAGNOSIS — H35.039 HYPERTENSIVE RETINOPATHY, UNSPECIFIED EYE: ICD-10-CM

## 2022-10-18 DIAGNOSIS — J02.9 ACUTE PHARYNGITIS, UNSPECIFIED: ICD-10-CM

## 2022-10-18 DIAGNOSIS — E78.1 PURE HYPERGLYCERIDEMIA: ICD-10-CM

## 2022-10-18 DIAGNOSIS — Z41.9 ENCOUNTER FOR PROCEDURE FOR PURPOSES OTHER THAN REMEDYING HEALTH STATE, UNSPECIFIED: Chronic | ICD-10-CM

## 2022-10-18 DIAGNOSIS — Z20.822 CONTACT WITH AND (SUSPECTED) EXPOSURE TO COVID-19: ICD-10-CM

## 2022-10-18 LAB
ALBUMIN SERPL ELPH-MCNC: 4.6 G/DL — SIGNIFICANT CHANGE UP (ref 3.3–5)
ALP SERPL-CCNC: 71 U/L — SIGNIFICANT CHANGE UP (ref 40–120)
ALT FLD-CCNC: 10 U/L — SIGNIFICANT CHANGE UP (ref 10–45)
ANION GAP SERPL CALC-SCNC: 13 MMOL/L — SIGNIFICANT CHANGE UP (ref 5–17)
AST SERPL-CCNC: 13 U/L — SIGNIFICANT CHANGE UP (ref 10–40)
BILIRUB SERPL-MCNC: 0.3 MG/DL — SIGNIFICANT CHANGE UP (ref 0.2–1.2)
BUN SERPL-MCNC: 15 MG/DL — SIGNIFICANT CHANGE UP (ref 7–23)
CALCIUM SERPL-MCNC: 10 MG/DL — SIGNIFICANT CHANGE UP (ref 8.4–10.5)
CHLORIDE SERPL-SCNC: 100 MMOL/L — SIGNIFICANT CHANGE UP (ref 96–108)
CO2 SERPL-SCNC: 24 MMOL/L — SIGNIFICANT CHANGE UP (ref 22–31)
CREAT SERPL-MCNC: 0.73 MG/DL — SIGNIFICANT CHANGE UP (ref 0.5–1.3)
EGFR: 103 ML/MIN/1.73M2 — SIGNIFICANT CHANGE UP
GLUCOSE SERPL-MCNC: 183 MG/DL — HIGH (ref 70–99)
HCT VFR BLD CALC: 38.2 % — LOW (ref 39–50)
HGB BLD-MCNC: 13.2 G/DL — SIGNIFICANT CHANGE UP (ref 13–17)
MCHC RBC-ENTMCNC: 31.9 PG — SIGNIFICANT CHANGE UP (ref 27–34)
MCHC RBC-ENTMCNC: 34.6 GM/DL — SIGNIFICANT CHANGE UP (ref 32–36)
MCV RBC AUTO: 92.3 FL — SIGNIFICANT CHANGE UP (ref 80–100)
NRBC # BLD: 0 /100 WBCS — SIGNIFICANT CHANGE UP (ref 0–0)
PLATELET # BLD AUTO: 328 K/UL — SIGNIFICANT CHANGE UP (ref 150–400)
POTASSIUM SERPL-MCNC: 4.4 MMOL/L — SIGNIFICANT CHANGE UP (ref 3.5–5.3)
POTASSIUM SERPL-SCNC: 4.4 MMOL/L — SIGNIFICANT CHANGE UP (ref 3.5–5.3)
PROT SERPL-MCNC: 8 G/DL — SIGNIFICANT CHANGE UP (ref 6–8.3)
RAPID RVP RESULT: SIGNIFICANT CHANGE UP
RBC # BLD: 4.14 M/UL — LOW (ref 4.2–5.8)
RBC # FLD: 12.9 % — SIGNIFICANT CHANGE UP (ref 10.3–14.5)
SARS-COV-2 RNA SPEC QL NAA+PROBE: SIGNIFICANT CHANGE UP
SODIUM SERPL-SCNC: 137 MMOL/L — SIGNIFICANT CHANGE UP (ref 135–145)
WBC # BLD: 6.59 K/UL — SIGNIFICANT CHANGE UP (ref 3.8–10.5)
WBC # FLD AUTO: 6.59 K/UL — SIGNIFICANT CHANGE UP (ref 3.8–10.5)

## 2022-10-18 PROCEDURE — 99283 EMERGENCY DEPT VISIT LOW MDM: CPT | Mod: 25

## 2022-10-18 PROCEDURE — 80053 COMPREHEN METABOLIC PANEL: CPT

## 2022-10-18 PROCEDURE — 36415 COLL VENOUS BLD VENIPUNCTURE: CPT

## 2022-10-18 PROCEDURE — 85027 COMPLETE CBC AUTOMATED: CPT

## 2022-10-18 PROCEDURE — 99284 EMERGENCY DEPT VISIT MOD MDM: CPT

## 2022-10-18 PROCEDURE — 0225U NFCT DS DNA&RNA 21 SARSCOV2: CPT

## 2022-10-18 PROCEDURE — 71045 X-RAY EXAM CHEST 1 VIEW: CPT

## 2022-10-18 PROCEDURE — 71045 X-RAY EXAM CHEST 1 VIEW: CPT | Mod: 26

## 2022-10-18 PROCEDURE — 94640 AIRWAY INHALATION TREATMENT: CPT

## 2022-10-18 RX ORDER — IPRATROPIUM/ALBUTEROL SULFATE 18-103MCG
3 AEROSOL WITH ADAPTER (GRAM) INHALATION ONCE
Refills: 0 | Status: COMPLETED | OUTPATIENT
Start: 2022-10-18 | End: 2022-10-18

## 2022-10-18 RX ORDER — IPRATROPIUM/ALBUTEROL SULFATE 18-103MCG
3 AEROSOL WITH ADAPTER (GRAM) INHALATION
Qty: 60 | Refills: 0
Start: 2022-10-18 | End: 2022-10-22

## 2022-10-18 RX ADMIN — Medication 3 MILLILITER(S): at 14:05

## 2022-10-18 NOTE — ED ADULT TRIAGE NOTE - CHIEF COMPLAINT QUOTE
pt c/o dry  cough x 1 week. also rpts chills, no fever, and sore throat. states chest feels tight after coughing. denies SOB.

## 2022-10-18 NOTE — ED PROVIDER NOTE - NSICDXPASTMEDICALHX_GEN_ALL_CORE_FT
PAST MEDICAL HISTORY:  Allergic rhinitis     Arthritis     Asthma     BPH (benign prostatic hyperplasia)     Cataract     Cough variant asthma pt denies    Depression     Diabetes     DM (diabetes mellitus) type 2    ED (erectile dysfunction)     Esophageal reflux     Essential tremor     HLD (hyperlipidemia)     HTN (hypertension)     Hypertension     Hypertensive retinopathy     Hypertriglyceridemia     Rectal bleeding

## 2022-10-18 NOTE — ED ADULT NURSE NOTE - DOES PATIENT HAVE ADVANCE DIRECTIVE
Chief Complaint   Patient presents with   • Consultation     Requires arteriovenous access for hemodialysis       History of Present Illness: Elliott Stevens, is a 58 year old male who requires dialysis access.      Currently on dialysis: Via a left-sided tunneled hemodialysis catheter since January 2020  Cause of renal failure: Immunosuppression medications for his heart transplant  Desires hemodialysis for long term access.  Right handed.  He requires supplemental oxygen because he has pulmonary fibrosis which is also related to his immunosuppressive medications.    Primary Physician: Juancralos Colon DO    Specialists:  Nephrologist: Dr. Gastelum  Cardiologist: Dr. Jonathan Berg from [facility].  He is the head of the transplantation team.  He comes up to the [place] area to see patients.    Dialysis Facility: [facility]    Review of Systems   Constitutional: Negative.    HENT: Positive for rhinorrhea.    Eyes: Negative.    Respiratory: Positive for apnea, chest tightness and shortness of breath.    Cardiovascular: Positive for leg swelling.   Gastrointestinal: Negative.    Endocrine: Positive for polydipsia.   Genitourinary: Negative.    Musculoskeletal: Negative.    Skin: Negative.    Allergic/Immunologic: Negative.    Neurological: Negative.    Hematological: Negative.    Psychiatric/Behavioral: Negative.        Past Medical History:   Diagnosis Date   • Abrasion of ear    • Anemia    • Anemia of chronic renal failure, stage 3 (moderate) (CMS/HCC) 7/26/2016   • CKD (chronic kidney disease)    • Congestive cardiac failure (CMS/HCC)    • Coronary artery disease    • Hernia of abdominal wall    • High cholesterol    • Idiopathic pulmonary fibrosis (CMS/HCC)    • Idiopathic pulmonary fibrosis (CMS/HCC)    • Ischemic cardiomyopathy    • LVAD (left ventricular assist device) present (CMS/HCC)    • Morbid obesity (CMS/HCC)    • Myocardial infarction (CMS/HCC)    • Stroke (CMS/HCC)        Past Surgical  History:   Procedure Laterality Date   • Abdomen surgery     • Anes arthroscopy knee,surg      right and left   • Bariatric surgery     • Cardiac surgery     • Cholecystectomy  12/16/2019   • Colon surgery     • Heart transplant     • Heart transplant  01/16/2014   • Incisional hernia repair  12/16/2019   • Shoulder surgery     • Small intestine surgery     • Tonsillectomy         Social History     Tobacco Use   • Smoking status: Former Smoker     Packs/day: 0.00   • Smokeless tobacco: Never Used   Substance Use Topics   • Alcohol use: No     Frequency: Never   • Drug use: No       Family Hx  Family History   Problem Relation Age of Onset   • Cancer, Ovarian Mother    • Myocardial Infarction Father         acute   • Myocardial Infarction Sister         acute   • Diabetes Sister    • Myocardial Infarction Maternal Grandmother         acute   • Cancer, Breast Maternal Grandmother    • Myocardial Infarction Maternal Grandfather         acute   • Myocardial Infarction Paternal Grandfather         acute   • Myocardial Infarction Maternal Uncle         acute   • Cancer, Breast Maternal Aunt    • Cancer, Lung Maternal Aunt        Meds:  Current Outpatient Medications   Medication Sig Dispense Refill   • albuterol (PROAIR HFA) 108 (90 Base) MCG/ACT inhaler Inhale 2 puffs into the lungs every 4 hours as needed for Shortness of Breath or Wheezing. 1 Inhaler 0   • sevelamer carbonate (RENVELA) 800 MG tablet Take 800 mg by mouth 2 times daily (with meals).     • dapsone 100 MG tablet Take 1 tablet by mouth daily. 30 tablet 6   • Syringe/Needle, Disp, (BD ECLIPSE SYRINGE) 25G X 5/8\" 3 ML Misc Use to inject procrit 3 times per week. 12 each 3   • epoetin stacy (PROCRIT) 4000 UNIT/ML injection Inject 1 mL into the skin 3 times a week. (Patient taking differently: Inject 4,000 Units into the skin 3 days a week. HD Mon , Wed , Friday  Takes after HD) 12 mL 3   • traMADol (ULTRAM) 50 MG tablet Take 1 tablet by mouth every 6 hours as  needed for Pain. 480 tablet 0   • bumetanide (BUMEX) 1 MG tablet Take 2 tablets by mouth 2 times daily. (Patient taking differently: Take 2 mg by mouth 2 times daily. Every day except for Wednesday takes 2mg at PM only, hold AM bumex on Wednesday only-patient will receive am metolazone.    Takes at 7475-0138) 180 tablet 0   • gabapentin (NEURONTIN) 100 MG capsule Take 1 capsule by mouth 3 times daily. 4870-3002-2572 270 capsule 0   • hydrALAZINE (APRESOLINE) 50 MG tablet Take 1 tablet by mouth 2 times daily. 4367-2777 180 tablet 0   • pantoprazole (PROTONIX) 40 MG tablet Take 1 tablet by mouth 2 times daily. 0141-7820 180 tablet 0   • pravastatin (PRAVACHOL) 10 MG tablet Take 1 tablet by mouth nightly. 2200 90 tablet 1   • terbutaline (BRETHINE) 5 MG tablet Take 1 tablet by mouth 3 times daily. 0700-1400-2200 270 tablet 0   • allopurinol (ZYLOPRIM) 100 MG tablet Take 1 tablet by mouth daily. 90 tablet 0   • ipratropium (ATROVENT) 0.03 % nasal spray Spray 1 spray in each nostril 2 times daily. 30 mL 3   • tamsulosin (FLOMAX) 0.4 MG Cap Take 2 capsules by mouth daily. (Patient taking differently: Take 0.8 mg by mouth at bedtime. ) 180 capsule 1   • prochlorperazine (COMPAZINE) 10 MG tablet Take 10 mg by mouth every 6 hours as needed for Nausea or Vomiting.     • ondansetron (ZOFRAN) 4 MG tablet Take 4 mg by mouth every 8 hours as needed for Nausea.     • Thiamine HCl (VITAMIN B-1) 100 MG tablet Take 1 tablet by mouth daily. 30 tablet 3   • sucralfate (CARAFATE) 1 g tablet Take 1 g by mouth 4 times daily.     • zolpidem (AMBIEN) 5 MG tablet Take 10 mg by mouth nightly as needed for Sleep. Do not start before January 1, 2020.     • cyclobenzaprine (FLEXERIL) 5 MG tablet Take 5 mg by mouth 3 times daily as needed for Muscle spasms.     • MULTIPLE VITAMIN PO Take 1 tablet by mouth 2 times daily. Capsule or chewable only     • OXYGEN-HELIUM IN 6 L continuous     • ferrous sulfate 325 (65 FE) MG tablet Take 325 mg by mouth  3 times daily. [id number]     • Oxymetazoline HCl (NASAL SPRAY) 0.05 % Solution Spray 2 sprays in each nostril 2 times daily as needed (post nasal drip).      • vitamin B-12 (CYANOCOBALAMIN) 1000 MCG tablet Take 1,000 mcg by mouth daily.      • lubiprostone (AMITIZA) 24 MCG capsule Take 24 mcg by mouth 2 times daily. 2894-9040     • tacrolimus (PROGRAF) 0.5 MG capsule Take 4.5 mg by mouth daily. Takes at 0700    Takes 4.5mg in AM and 5mg in PM.     • metOLazone (ZAROXOLYN) 2.5 MG tablet Take 2.5 mg by mouth 1 day a week. Wednesday only  Takes in place of bumetanide       No current facility-administered medications for this visit.        Allergies:  ALLERGIES:   Allergen Reactions   • Morphine SHORTNESS OF BREATH   • Adhesive   (Environmental) RASH   • Oxytetracycline HIVES and RASH   • Polymyxin B RASH        OBJECTIVE:  Visit Vitals  /72 (BP Location: LUE - Left upper extremity)   Pulse 87   Temp 96.4 °F (35.8 °C)   Ht 5' 10\" (1.778 m)   Wt 107.7 kg (237 lb 7 oz)   BMI 34.07 kg/m²       Physical Exam:  General: Overweight 58-year-old male.  Alert and oriented x3.  No acute distress.  He is wearing his supplemental oxygen.  Skin: Warm and dry.  HEENT: Clear.  No jaundice.  Neck: No masses, bruits, or tracheal deviation.  Respiratory: Good breath sounds bilaterally.  He is wearing supplemental oxygen.  No accessory muscle use.  Mild tachypnea.  Chest: The left-sided tunneled hemodialysis catheter is in place.  He has a right-sided PowerPort, used to deliver chemotherapy for his liver cancer.  He has a healed but somewhat wide median sternotomy.  Heart: Regular rhythm and rate.  Abdomen: Obese.  Nontender.  Extremities: Brachial pulses 2+.  Ulnar pulses 2+.  Radial pulse 1+ on the left and barely palpable on the right.  Nevertheless, capillary refill is 1 second or less at all fingertips, and his fingers are pink warm and well-perfused.  Carlos test shows good hand perfusion via the ulnar artery on the left,  much more sluggish with the left radial artery.  On the right, perfusion via either artery is somewhat sluggish.  There are no obvious veins in either arm.  Neurologic: Moves all 4 well.  No gross focal deficits.    ASSESSMENT:  End-stage renal disease on hemodialysis.  He requires arteriovenous access.  He is arterial supply to the arms and hands is subnormal as described above; it may or may not be adequate for a primary arteriovenous fistula.  He has no obvious veins.    PLAN:  Vein mapping and arterial Dopplers.  Return thereafter.    TIME: 40 minutes spent.  >50% was spent on counseling and/or coordination of care.    Kyle Taylor MD   No

## 2022-10-18 NOTE — ED ADULT TRIAGE NOTE - HEIGHT IN INCHES
3 Posture, length, shape and position symmetric and appropriate for age; movement patterns with normal strength and range of motion; hips without evidence of dislocation on Calloway and Ortalani maneuvers and by gluteal fold patterns.

## 2022-10-18 NOTE — ED PROVIDER NOTE - NSFOLLOWUPINSTRUCTIONS_ED_ALL_ED_FT
Your labs were reassuring today. You will be contacted with the results of your respiratory viral panel.     Your chest xray did not show a pneumonia.    Continue your home medications as prescribed.    Take one tab of tessalon perles up to three times daily for cough.    Use nebulizer machine with albuterol pods for chest tightness/wheezing.    If your symptoms persist, please be reevaluated by your physician.    Return to the Emergency Department if you develop fever>100.4F, worsening cough, chest pain, shortness of breath, abd pain, vomiting or any other concerns.      Viral Respiratory Infection      A viral respiratory infection is an illness that affects parts of the body that are used for breathing. These include the lungs, nose, and throat. It is caused by a germ called a virus.    Some examples of this kind of infection are:  •A cold.      •The flu (influenza).      •A respiratory syncytial virus (RSV) infection.        What are the causes?    This condition is caused by a virus. It spreads from person to person. You can get the virus if:  •You breathe in droplets from someone who is sick.      •You come in contact with people who are sick.      •You touch mucus or other fluid from a person who is sick.        What are the signs or symptoms?    Symptoms of this condition include:  •A stuffy or runny nose.      •A sore throat.      •A cough.      •Shortness of breath.      •Trouble breathing.      •Yellow or green fluid in the nose.      Other symptoms may include:  •A fever.      •Sweating or chills.      •Tiredness (fatigue).      •Achy muscles.      •A headache.        How is this treated?    This condition may be treated with:  •Medicines that treat viruses.      •Medicines that make it easy to breathe.      •Medicines that are sprayed into the nose.      •Acetaminophen or NSAIDs, such as ibuprofen, to treat fever.        Follow these instructions at home:    Managing pain and congestion     •Take over-the-counter and prescription medicines only as told by your doctor.    •If you have a sore throat, gargle with salt water. Do this 3–4 times a day or as needed.  •To make salt water, dissolve ½–1 tsp (3–6 g) of salt in 1 cup (237 mL) of warm water. Make sure that all the salt dissolves.        •Use nose drops made from salt water. This helps with stuffiness (congestion). It also helps soften the skin around your nose.    •Take 2 tsp (10 mL) of honey at bedtime to lessen coughing at night.  •Do not give honey to children who are younger than 1 year old.        •Drink enough fluid to keep your pee (urine) pale yellow.        General instructions   A sign telling the reader not to smoke.   •Rest as much as possible.      • Do not drink alcohol.      • Do not smoke or use any products that contain nicotine or tobacco. If you need help quitting, ask your doctor.      •Keep all follow-up visits.        How is this prevented?       Washing hands with soap and water.       A person covering her mouth and nose with a cloth while sneezing.     •Get a flu shot every year. Ask your doctor when you should get your flu shot.    • Do not let other people get your germs. If you are sick:  •Wash your hands with soap and water often. Wash your hands after you cough or sneeze. Wash hands for at least 20 seconds. If you cannot use soap and water, use hand .      •Cover your mouth when you cough. Cover your nose and mouth when you sneeze.      •Do not share cups or eating utensils.      •Clean commonly used objects often. Clean commonly touched surfaces.      •Stay home from work or school.        •Avoid contact with people who are sick during cold and flu season. This is in fall and winter.        Get help if:    •Your symptoms last for 10 days or longer.      •Your symptoms get worse over time.      •You have very bad pain in your face or forehead.      •Parts of your jaw or neck get very swollen.      •You have shortness of breath.        Get help right away if:    •You feel pain or pressure in your chest.      •You have trouble breathing.      •You faint or feel like you will faint.      •You keep vomiting and it gets worse.      •You feel confused.      These symptoms may be an emergency. Get help right away. Call your local emergency services (911 in the [place]   • Do not wait to see if the symptoms will go away.        •  Do not drive yourself to the hospital.         Summary    •A viral respiratory infection is an illness that affects parts of the body that are used for breathing.      •Examples of this illness include a cold, the flu, and a respiratory syncytial virus (RSV) infection.      •The infection can cause a runny nose, cough, sore throat, and fever.      •Follow what your doctor tells you about taking medicines, drinking lots of fluid, washing your hands, resting at home, and avoiding people who are sick.      This information is not intended to replace advice given to you by your health care provider. Make sure you discuss any questions you have with your health care provider.

## 2022-10-18 NOTE — ED PROVIDER NOTE - PHYSICAL EXAMINATION
VITAL SIGNS: I have reviewed nursing notes and confirm.  CONSTITUTIONAL: Well-developed;  in no acute distress.   SKIN:  warm and dry, no acute rash.   HEAD:  normocephalic, atraumatic.  EYES: PERRL, EOM intact; conjunctiva and sclera clear.  ENT: No nasal discharge; airway clear. No posterior oropharynx erythema, no tonsillar edema or exudates. TMs clear without erythema or bulging.   NECK: Supple; non tender.  CARD: S1, S2 normal; no murmurs, gallops, or rubs. Regular rate and rhythm.   RESP:  Clear to auscultation b/l, no wheezes, rales or rhonchi. No respiratory distress.  ABD: Normal bowel sounds; soft; non-distended; non-tender; no guarding/ rebound.  EXT: Normal ROM. No clubbing, cyanosis or edema. 2+ pulses to b/l ue/le.  NEURO: Alert, oriented, grossly unremarkable  PSYCH: Cooperative, mood and affect appropriate.

## 2022-10-18 NOTE — ED PROVIDER NOTE - NS ED ROS FT
Constitutional: +subj fever. +chills.  Eyes: No redness. No discharge. No vision change.   ENT: +sore throat. No ear pain.  Cardiovascular: No chest pain. No leg swelling.  Respiratory: +cough. No shortness of breath.  GI: No abdominal pain. No vomiting. No diarrhea.   MSK: No joint pain. No back pain.   Skin: No rash. No abrasions.   Neuro: No numbness. No weakness.   Psych: No known mental health issues.

## 2022-10-18 NOTE — ED PROVIDER NOTE - CLINICAL SUMMARY MEDICAL DECISION MAKING FREE TEXT BOX
amb o2 sat 98% Afebrile and hemodynamically stable. Lungs CTAB. Amb O2 sat 98%. He was given duoneb tx while in ED with improvement in symptoms. Labs unremarkable. RVP sent and pending. CXR without acute cardiopulmonary abnormality. Discussed symptomatic management of likely viral URI. Will give rx for tessalon perles. F/U with PMD, return precautions given.

## 2022-10-18 NOTE — ED PROVIDER NOTE - PATIENT PORTAL LINK FT
You can access the FollowMyHealth Patient Portal offered by [facility] by registering at the following website: [email or web address]. By joining [employer]’s FollowMyHealth portal, you will also be able to view your health information using other applications (apps) compatible with our system.

## 2022-10-18 NOTE — ED PROVIDER NOTE - OBJECTIVE STATEMENT
63yo male with pmhx of asthma presents with 1wk of dry cough, subj fever, chills and sore throat. Using inhaler at home but reports occasional chest tightness/wheezing. States cough is keeping him up at night. Denies headache, chest pain, shortness of breath, N/V/D, leg swelling. Denies known sick contacts. +COVID vaccine.

## 2022-10-18 NOTE — ED PROVIDER NOTE - NSICDXPASTSURGICALHX_GEN_ALL_CORE_FT
PAST SURGICAL HISTORY:  H/O colonoscopy     H/O esophagogastroduodenoscopy     H/O knee surgery     H/O shoulder surgery     S/P appendectomy     S/P knee surgery arthroscopy    Surgery, elective ankle ligament reconstruction

## 2022-11-01 ENCOUNTER — NON-APPOINTMENT (OUTPATIENT)
Age: 62
End: 2022-11-01

## 2022-11-04 ENCOUNTER — NON-APPOINTMENT (OUTPATIENT)
Age: 62
End: 2022-11-04

## 2022-11-08 ENCOUNTER — EMERGENCY (EMERGENCY)
Facility: HOSPITAL | Age: 62
LOS: 1 days | Discharge: HOME | End: 2022-11-08
Attending: STUDENT IN AN ORGANIZED HEALTH CARE EDUCATION/TRAINING PROGRAM | Admitting: STUDENT IN AN ORGANIZED HEALTH CARE EDUCATION/TRAINING PROGRAM
Payer: MEDICARE

## 2022-11-08 ENCOUNTER — NON-APPOINTMENT (OUTPATIENT)
Age: 62
End: 2022-11-08

## 2022-11-08 VITALS
SYSTOLIC BLOOD PRESSURE: 127 MMHG | OXYGEN SATURATION: 97 % | RESPIRATION RATE: 18 BRPM | DIASTOLIC BLOOD PRESSURE: 72 MMHG | TEMPERATURE: 99 F | WEIGHT: 151.9 LBS | HEART RATE: 79 BPM | HEIGHT: 63 IN

## 2022-11-08 DIAGNOSIS — Z98.890 OTHER SPECIFIED POSTPROCEDURAL STATES: Chronic | ICD-10-CM

## 2022-11-08 DIAGNOSIS — Z41.9 ENCOUNTER FOR PROCEDURE FOR PURPOSES OTHER THAN REMEDYING HEALTH STATE, UNSPECIFIED: Chronic | ICD-10-CM

## 2022-11-08 DIAGNOSIS — Z90.49 ACQUIRED ABSENCE OF OTHER SPECIFIED PARTS OF DIGESTIVE TRACT: Chronic | ICD-10-CM

## 2022-11-08 PROCEDURE — 93010 ELECTROCARDIOGRAM REPORT: CPT

## 2022-11-08 PROCEDURE — 99284 EMERGENCY DEPT VISIT MOD MDM: CPT

## 2022-11-08 PROCEDURE — 93005 ELECTROCARDIOGRAM TRACING: CPT

## 2022-11-08 PROCEDURE — 71045 X-RAY EXAM CHEST 1 VIEW: CPT | Mod: 26

## 2022-11-08 PROCEDURE — 99283 EMERGENCY DEPT VISIT LOW MDM: CPT | Mod: 25

## 2022-11-08 PROCEDURE — 71045 X-RAY EXAM CHEST 1 VIEW: CPT

## 2022-11-08 RX ORDER — AZITHROMYCIN 500 MG/1
2 TABLET, FILM COATED ORAL
Qty: 6 | Refills: 0
Start: 2022-11-08

## 2022-11-08 RX ORDER — AZITHROMYCIN 500 MG/1
1 TABLET, FILM COATED ORAL
Qty: 3 | Refills: 0
Start: 2022-11-08 | End: 2022-11-10

## 2022-11-08 NOTE — ED PROVIDER NOTE - NS ED ATTENDING STATEMENT MOD
I have seen and examined this patient and fully participated in the care of this patient as the teaching attending.  The service was shared with the MCKENZIE.  I reviewed and verified the documentation and independently performed the documented:

## 2022-11-08 NOTE — ED ADULT TRIAGE NOTE - CHIEF COMPLAINT QUOTE
Pt presents w/ productive cough for 2 weeks refractory to neublized albuterol at home, loratidine for 1 wk. Hx asthma. Pt states "Im supposed to have ear surgery next week, Im not sure if I should cancel."

## 2022-11-08 NOTE — ED PROVIDER NOTE - PATIENT PORTAL LINK FT
You can access the FollowMyHealth Patient Portal offered by [facility] by registering at the following website: [email or web address]. By joining [employer]’s FollowMyHealth portal, you will also be able to view your health information using other applications (apps) compatible with our system.

## 2022-11-08 NOTE — ED PROVIDER NOTE - ATTENDING CONTRIBUTION TO CARE
61 y/o M with PMHx asthma, DM, and HTN presents to ED c/o 1 months of cough that is productive. No fevers. Feels some SOB which is chronic.     ROS negative.     General: Awake, alert and oriented. No acute distress. Well developed, hydrated and nourished. Appears stated age.   Skin: Skin in warm, dry and intact without rashes or lesions. Appropriate color for ethnicity  HENMT: head normocephalic and atraumatic; bilateral external ears without swelling. no nasal discharge. moist oral mucosa. supple neck, trachea midline  EYES: Conjunctiva clear. nonicteric sclera. EOM intact, Eyelids are normal in appearance without swelling or lesions.  Cardiac: well perfused  Respiratory: breathing comfortably on room air. no audible wheezing or stridor  Abdominal: nondistended  MSK: Neck and back are without deformity, visible external skin changes, or signs of trauma. Curvature of the cervical, thoracic, and lumbar spine are within normal limits. no external signs of trauma. no apparent deficits in ROM of any extremity  Neurological: The patient is awake, alert and oriented to person, place, and time with normal speech. CN 2-12 grossly intact. no apparent deficits. Memory is normal and thought process is intact. No gait abnormalities are appreciated.   Psychiatric: Appropriate mood and affect. Good judgement and insight. No visual or auditory hallucinations.     CXR and EKG both unremarkable. No hypoxia. Considering length of symptoms, will treat for atypical pneumonia and discharge with return precautions.

## 2022-11-08 NOTE — ED PROVIDER NOTE - NS ED ROS FT
Eyes/ENT: (-) blurry vision, (-) epistaxis, (-) sore throat  Cardiovascular: (+) chest pain, (-) syncope  Gastrointestinal: (-) abdominal pain, (-) vomiting, (-) diarrhea  Musculoskeletal: (-) neck pain, (-) back pain, (-) joint pain  Integumentary: (-) rash, (-) edema  Neurological: (-) headache, (-) altered mental status  Psychiatric: (-) hallucinations,   Allergic/Immunologic: (-) pruritus

## 2022-11-08 NOTE — ED ADULT NURSE NOTE - OBJECTIVE STATEMENT
Pt presents to ED C/O productive cough x 2 weeks with nausea and SOB. Denies blood in sputum, denies vomiting/ fever. Protecting airway. Speaking full sentences, [facility]  used with MD resident for assessment. Pt english and Indonesian speaking.

## 2022-11-08 NOTE — ED PROVIDER NOTE - OBJECTIVE STATEMENT
62 y M pmh asthma, DM2, HLD, obesity, htn and covid req vent in 2020 presenting in setting of ongoing cough, subjective fevers, and sore throat. Was seen in ED 2 weeks ago for similar complaint. States sxs have not improved nor worsening. States cough is productive of non-bloody sputum, subjective fevers/chills (no temp taken at home), sob after ~ 10 blocks. Endorses some chest pain that radiates to back 6/10, sharp, waxes and wanes, with no relieving or exacerbating factors.     Denies abdominal pain, n/v, headache, eye pain, nasal congestion, or difficulty swallowing/sore throat. 62 y Bengali speaking M pmh asthma, DM2, HLD, obesity, htn and covid req vent in 2020 presenting in setting of ongoing cough, subjective fevers, and sore throat. Was seen in ED 2 weeks ago for similar complaint. States sxs have not improved nor worsening. States cough is productive of non-bloody sputum, subjective fevers/chills (no temp taken at home), sob after ~ 10 blocks. Endorses some chest pain that radiates to back 6/10, sharp, waxes and wanes, with no relieving or exacerbating factors.     Denies abdominal pain, n/v, headache, eye pain, nasal congestion, or difficulty swallowing/sore throat.

## 2022-11-08 NOTE — ED PROVIDER NOTE - CLINICAL SUMMARY MEDICAL DECISION MAKING FREE TEXT BOX
62 y M pmh asthma, DM2, HLD, obesity, htn and covid req vent in 2020 presenting in setting of ongoing cough, subjective fevers, and sore throat. Was seen in ED 2 weeks ago for similar complaint. States sxs have not improved nor worsening. States cough is productive of non-bloody sputum, subjective fevers/chills (no temp taken at home), sob after ~ 10 blocks. Endorses some chest pain that radiates to back 6/10, sharp, waxes and wanes, with no relieving or exacerbating factors. 62 y Slovenian speaking M pmh asthma, DM2, HLD, obesity, htn and covid req vent in 2020 presenting in setting of ongoing cough, subjective fevers, and sore throat. Was seen in ED 2 weeks ago for similar complaint. States sxs have not improved nor worsening. States cough is productive of non-bloody sputum, subjective fevers/chills (no temp taken at home), sob after ~ 10 blocks. Endorses some chest pain that radiates to back 6/10, sharp, waxes and wanes, with no relieving or exacerbating factors. 62 y Chinese speaking M pmh asthma, DM2, HLD, obesity, htn and covid req vent in 2020 presenting in setting of ongoing cough, subjective fevers, and sore throat. Was seen in ED 2 weeks ago for similar complaint. States sxs have not improved nor worsening. States cough is productive of non-bloody sputum, subjective fevers/chills (no temp taken at home), sob after ~ 10 blocks. Endorses some chest pain that radiates to back 6/10, sharp, waxes and wanes, with no relieving or exacerbating factors.    differential: chf/mi, viral exanthema, asthma exacerbation     plan: ekg, cxr 62 y Korean speaking M pmh asthma, DM2, HLD, obesity, htn and covid req vent in 2020 presenting in setting of ongoing cough, subjective fevers, and sore throat. Was seen in ED 2 weeks ago for similar complaint. States sxs have not improved nor worsening. States cough is productive of non-bloody sputum, subjective fevers/chills (no temp taken at home), sob after ~ 10 blocks. Endorses some chest pain that radiates to back 6/10, sharp, waxes and wanes, with no relieving or exacerbating factors.    differential: chf/mi, viral exanthema, asthma exacerbation     plan: ekg, cxr    Results: cxr wnl 62 y Chinese speaking M pmh asthma, DM2, HLD, obesity, htn and covid req vent in 2020 presenting in setting of ongoing cough, subjective fevers, and sore throat. Was seen in ED 2 weeks ago for similar complaint. States sxs have not improved nor worsening. States cough is productive of non-bloody sputum, subjective fevers/chills (no temp taken at home), sob after ~ 10 blocks. Endorses some chest pain that radiates to back 6/10, sharp, waxes and wanes, with no relieving or exacerbating factors.    differential: chf/mi, viral exanthema, asthma exacerbation     plan: ekg, cxr    Results: cxr wnl, ekg nsr    Will discharge patient with z pack and benzonatate, follow-up with his PCP

## 2022-11-08 NOTE — ED PROVIDER NOTE - PROGRESS NOTE DETAILS
CXR wnl CXR wnl, EKG NSR 63 y/o M with PMHx asthma, DM, and HTN presents to ED c/o 1 months of cough that is productive. No fevers. Feels some SOB which is chronic.     ROS negative.     General: Awake, alert and oriented. No acute distress. Well developed, hydrated and nourished. Appears stated age.   Skin: Skin in warm, dry and intact without rashes or lesions. Appropriate color for ethnicity  HENMT: head normocephalic and atraumatic; bilateral external ears without swelling. no nasal discharge. moist oral mucosa. supple neck, trachea midline  EYES: Conjunctiva clear. nonicteric sclera. EOM intact, Eyelids are normal in appearance without swelling or lesions.  Cardiac: well perfused  Respiratory: breathing comfortably on room air. no audible wheezing or stridor  Abdominal: nondistended  MSK: Neck and back are without deformity, visible external skin changes, or signs of trauma. Curvature of the cervical, thoracic, and lumbar spine are within normal limits. no external signs of trauma. no apparent deficits in ROM of any extremity  Neurological: The patient is awake, alert and oriented to person, place, and time with normal speech. CN 2-12 grossly intact. no apparent deficits. Memory is normal and thought process is intact. No gait abnormalities are appreciated.   Psychiatric: Appropriate mood and affect. Good judgement and insight. No visual or auditory hallucinations.     CXR and EKG both unremarkable. No hypoxia. Considering length of symptoms, will treat for atypical pneumonia and discharge with return precautions.

## 2022-11-08 NOTE — ED PROVIDER NOTE - PHYSICAL EXAMINATION
CONSTITUTIONAL:  Well appearing, well nourished, awake, alert, oriented to person, place, time/situation and in no apparent distress.  HENMT:  Head is atraumatic normocephalic.  External ears normal and TMs visualized and normal.  No nasal secretions or epistaxis.  Posterior pharynx normal and airway is patent.  No tonsillar enlargement or exudates.  Uvula midline.  Tongue is normal.  Mucous membranes are moist.  No angioedema and no stridor.  Neck is supple without edema or adenopathy.   EYES:  Clear bilaterally, pupils equal, round and reactive to light, approximately 4-6 mm bilateral pupils size.  CARDIAC:  Normal rate, regular rhythm.  Heart sounds S1, S2.  No murmurs, rubs or gallops.  RESPIRATORY:  Lungs are clear bilaterally with good aeration.  No respiratory distress.  Non-tachypneic and non-labored.  No accessory muscle use.  Speaking full sentences.  No bony chest wall tenderness or crepitation palpated.  GASTROENTEROLOGY:  Belly is soft and non-tender in all four quadrants.  There is no rebound or guarding. No abdominal distention or suprapubic fullness present.  GENITOURINARY:  Absent CVAT bilaterally.  No suprapubic tenderness to palpation.  MUSCULOSKELETAL:  Spine appears normal, range of motion is not limited, no muscle or joint tenderness.  No extremity edema, tenderness, or evidence of trauma.  Bilateral radial, femoral, popliteal, dorsalis pedis, and posterior tibial pulses present, strong, 2+.  Cap refill is <2 seconds in all fingers and toes bilaterally.  All compartments soft and non-tender.  NEUROLOGICAL:  Patient is alert, oriented x person, place and time.

## 2022-11-08 NOTE — ED PROVIDER NOTE - NS_ATTENDINGSCRIBE_ED_ALL_ED
I personally performed the service described in the documentation recorded by the scribe in my presence, and it accurately and completely records my words and actions.
98

## 2022-11-11 DIAGNOSIS — I10 ESSENTIAL (PRIMARY) HYPERTENSION: ICD-10-CM

## 2022-11-11 DIAGNOSIS — J45.909 UNSPECIFIED ASTHMA, UNCOMPLICATED: ICD-10-CM

## 2022-11-11 DIAGNOSIS — R05.9 COUGH, UNSPECIFIED: ICD-10-CM

## 2022-11-11 DIAGNOSIS — Z79.84 LONG TERM (CURRENT) USE OF ORAL HYPOGLYCEMIC DRUGS: ICD-10-CM

## 2022-11-11 DIAGNOSIS — M19.90 UNSPECIFIED OSTEOARTHRITIS, UNSPECIFIED SITE: ICD-10-CM

## 2022-11-11 DIAGNOSIS — R07.9 CHEST PAIN, UNSPECIFIED: ICD-10-CM

## 2022-11-11 DIAGNOSIS — K21.9 GASTRO-ESOPHAGEAL REFLUX DISEASE WITHOUT ESOPHAGITIS: ICD-10-CM

## 2022-11-11 DIAGNOSIS — N40.0 BENIGN PROSTATIC HYPERPLASIA WITHOUT LOWER URINARY TRACT SYMPTOMS: ICD-10-CM

## 2022-11-11 DIAGNOSIS — Z79.01 LONG TERM (CURRENT) USE OF ANTICOAGULANTS: ICD-10-CM

## 2022-11-11 DIAGNOSIS — E11.9 TYPE 2 DIABETES MELLITUS WITHOUT COMPLICATIONS: ICD-10-CM

## 2022-11-11 DIAGNOSIS — J18.9 PNEUMONIA, UNSPECIFIED ORGANISM: ICD-10-CM

## 2022-11-11 DIAGNOSIS — E78.1 PURE HYPERGLYCERIDEMIA: ICD-10-CM

## 2022-11-11 DIAGNOSIS — E78.5 HYPERLIPIDEMIA, UNSPECIFIED: ICD-10-CM

## 2022-11-11 DIAGNOSIS — Z79.4 LONG TERM (CURRENT) USE OF INSULIN: ICD-10-CM

## 2022-11-14 ENCOUNTER — RX RENEWAL (OUTPATIENT)
Age: 62
End: 2022-11-14

## 2022-11-14 ENCOUNTER — TRANSCRIPTION ENCOUNTER (OUTPATIENT)
Age: 62
End: 2022-11-14

## 2022-11-14 LAB — SARS-COV-2 N GENE NPH QL NAA+PROBE: NOT DETECTED

## 2022-11-14 RX ORDER — HYDROCODONE BITARTRATE AND ACETAMINOPHEN 5; 325 MG/1; MG/1
5-325 TABLET ORAL
Qty: 15 | Refills: 0 | Status: ACTIVE | COMMUNITY
Start: 2022-11-14 | End: 1900-01-01

## 2022-11-14 RX ORDER — DOCUSATE SODIUM 100 MG/1
100 CAPSULE, LIQUID FILLED ORAL TWICE DAILY
Qty: 42 | Refills: 0 | Status: ACTIVE | COMMUNITY
Start: 2022-11-14 | End: 1900-01-01

## 2022-11-14 RX ORDER — CEFUROXIME AXETIL 500 MG/1
500 TABLET ORAL
Qty: 14 | Refills: 0 | Status: ACTIVE | COMMUNITY
Start: 2022-11-14 | End: 1900-01-01

## 2022-11-14 NOTE — ASU PATIENT PROFILE, ADULT - ABILITY TO HEAR (WITH HEARING AID OR HEARING APPLIANCE IF NORMALLY USED):
Adequate: hears normal conversation without difficulty in left ear; right ear adequate/Mildly to Moderately Impaired: difficulty hearing in some environments or speaker may need to increase volume or speak distinctly

## 2022-11-14 NOTE — BRIEF OPERATIVE NOTE - NSICDXBRIEFPROCEDURE_GEN_ALL_CORE_FT
PROCEDURES:  Revision, mastoidectomy, resulting in radical mastoidectomy 14-Nov-2022 09:36:28  Polina Adams   PROCEDURES:  Revision, mastoidectomy, resulting in tympanoplasty 15-Nov-2022 13:29:45  Polina Adams

## 2022-11-14 NOTE — ASU PATIENT PROFILE, ADULT - ACCEPTABLE
tobramycin Intermediate ^8 mcg/mL     trimethoprim-sulfamethoxazole Resistant >=^320 mcg/mL     Citrobacter freundii (2)     Antibiotic Interpretation KEN Status    ceFAZolin Resistant <=^4 mcg/mL     cefepime Sensitive <=^0.12 mcg/mL     cefTRIAXone Sensitive <=^0.25 mcg/mL     ertapenem Sensitive <=^0.12 mcg/mL     gentamicin Resistant >=^16 mcg/mL     levofloxacin Resistant >=^8 mcg/mL     nitrofurantoin Sensitive <=^16 mcg/mL     piperacillin-tazobactam Sensitive ^8 mcg/mL     tobramycin Intermediate ^8 mcg/mL     trimethoprim-sulfamethoxazole Resistant >=^320 mcg/mL     Streptococcus anginosus (3)     Antibiotic Interpretation KEN Status    ampicillin Sensitive <=^0.25 mcg/mL     benzylpenicillin Sensitive <=^0.06 mcg/mL     cefotaxime Sensitive <=^0.12 mcg/mL     cefTRIAXone Sensitive ^0.5 mcg/mL     levofloxacin Sensitive ^1 mcg/mL     vancomycin Sensitive ^0.5 mcg/mL      Condensed View   Lab and Collection         Radiology :    CT abdomen and pelvis -      1. Nephrolithiasis on the right. 2. Multiple spinal compression fractures which were not present on 17 January 2020 but are stable since 14 April 2020.  3. Cardiomegaly. IMPRESSION:     1. Complicated UTI   2. Clostridium bacteremia   3. Fever / Leukocytosis sec to above     RECOMMENDATIONS:      1. Invanz 1 gram IV q 24 hrs   2.  Needs colonoscopy to look for GI source of Clostridium 0

## 2022-11-14 NOTE — ASU DISCHARGE PLAN (ADULT/PEDIATRIC) - CARE PROVIDER_API CALL
Abhay Marquez)  Otolaryngology  [address], [address]  [place], [place] [address]  Phone: [phone]  Fax: [phone]  Follow Up Time:

## 2022-11-14 NOTE — BRIEF OPERATIVE NOTE - NSICDXBRIEFPREOP_GEN_ALL_CORE_FT
PRE-OP DIAGNOSIS:  Chronic left mastoiditis 14-Nov-2022 09:36:38  Polina Adams  Cholesteatoma, left 14-Nov-2022 09:37:23  Polina Adams   PRE-OP DIAGNOSIS:  Cholesteatoma, left 14-Nov-2022 09:37:23  Polina Adams  Conductive hearing loss in left ear 15-Nov-2022 13:31:33  Polina Adams

## 2022-11-14 NOTE — BRIEF OPERATIVE NOTE - OPERATION/FINDINGS
Left attic cholesteatoma involving incus, adherent to facial nerve in tympanic segment and sinus tympani, could not be  from nerve safely

## 2022-11-14 NOTE — ASU PATIENT PROFILE, ADULT - NSICDXPASTMEDICALHX_GEN_ALL_CORE_FT
PAST MEDICAL HISTORY:  Allergic rhinitis     Arthritis     Asthma     BPH (benign prostatic hyperplasia)     Cataract     Cough variant asthma pt denies    Depression     Diabetes     DM (diabetes mellitus) type 2    ED (erectile dysfunction)     Esophageal reflux     Essential tremor     HLD (hyperlipidemia)     HTN (hypertension)     Hypertension     Hypertensive retinopathy     Hypertriglyceridemia     Rectal bleeding      PAST MEDICAL HISTORY:  Allergic rhinitis     Arthritis     Asthma     BPH (benign prostatic hyperplasia)     Cataract     Cough variant asthma pt denies    Depression     DM (diabetes mellitus) type 2    ED (erectile dysfunction)     Esophageal reflux     Essential tremor     GERD (gastroesophageal reflux disease)     HLD (hyperlipidemia)     HTN (hypertension)     Hypertensive retinopathy     Hypertriglyceridemia     Rectal bleeding

## 2022-11-14 NOTE — ASU DISCHARGE PLAN (ADULT/PEDIATRIC) - PATIENT EDUCATION MATERIALS PROVIED
Patient spoken to prior to surgery Patient spoken to prior to surgery/Provider pre-printed instructions given

## 2022-11-14 NOTE — ASU DISCHARGE PLAN (ADULT/PEDIATRIC) - PROCEDURE
Left revision modified radical mastoidectomy Left revision mastoidectomy resulting in tympanoplasty and exteriorization

## 2022-11-14 NOTE — ASU PATIENT PROFILE, ADULT - AS SC BRADEN MOBILITY
(4) no limitation Other (Free Text): Pt was impaled by a beach umbrella in [place].  She never got stitches and did not like it healed so she had a scar revision, which is what she presents with today. Other (Free Text): Pt was impaled by a beach umbrella in [place].  She never got stitches and did not like it healed so she had a scar revision, which is what she presents with today.

## 2022-11-14 NOTE — ASU DISCHARGE PLAN (ADULT/PEDIATRIC) - ASU DC SPECIAL INSTRUCTIONSFT
Please tell pt to call Dr. Marquez's office at [phone] to make a followup appointment in 1 week. Please remove dressing in 24 hours. Replace cotton ball and change it as often as it gets soiled (approximately 5-7 times each day). Leave the cotton ball out when pt needs to change it less than twice each day. Elevate the head of the bed with two pillows.

## 2022-11-14 NOTE — BRIEF OPERATIVE NOTE - NSICDXBRIEFPOSTOP_GEN_ALL_CORE_FT
POST-OP DIAGNOSIS:  Chronic left mastoiditis 14-Nov-2022 09:37:06  Polina Adams  Cholesteatoma, left 14-Nov-2022 09:37:38  Polina Adams   POST-OP DIAGNOSIS:  Cholesteatoma, left 14-Nov-2022 09:37:38  Polina Adams  Conductive hearing loss in left ear 15-Nov-2022 13:31:59  Polina Adams

## 2022-11-15 ENCOUNTER — RESULT REVIEW (OUTPATIENT)
Age: 62
End: 2022-11-15

## 2022-11-15 ENCOUNTER — APPOINTMENT (OUTPATIENT)
Dept: OTOLARYNGOLOGY | Facility: AMBULATORY SURGERY CENTER | Age: 62
End: 2022-11-15

## 2022-11-15 ENCOUNTER — OUTPATIENT (OUTPATIENT)
Dept: OUTPATIENT SERVICES | Facility: HOSPITAL | Age: 62
LOS: 1 days | Discharge: HOME | End: 2022-11-15
Payer: MEDICAID

## 2022-11-15 VITALS
OXYGEN SATURATION: 99 % | WEIGHT: 151.68 LBS | RESPIRATION RATE: 18 BRPM | SYSTOLIC BLOOD PRESSURE: 96 MMHG | HEIGHT: 64 IN | TEMPERATURE: 97 F | DIASTOLIC BLOOD PRESSURE: 58 MMHG | HEART RATE: 64 BPM

## 2022-11-15 VITALS
TEMPERATURE: 98 F | DIASTOLIC BLOOD PRESSURE: 59 MMHG | HEART RATE: 84 BPM | OXYGEN SATURATION: 99 % | SYSTOLIC BLOOD PRESSURE: 108 MMHG | RESPIRATION RATE: 12 BRPM

## 2022-11-15 DIAGNOSIS — Z98.890 OTHER SPECIFIED POSTPROCEDURAL STATES: Chronic | ICD-10-CM

## 2022-11-15 DIAGNOSIS — Z90.49 ACQUIRED ABSENCE OF OTHER SPECIFIED PARTS OF DIGESTIVE TRACT: Chronic | ICD-10-CM

## 2022-11-15 DIAGNOSIS — Z41.9 ENCOUNTER FOR PROCEDURE FOR PURPOSES OTHER THAN REMEDYING HEALTH STATE, UNSPECIFIED: Chronic | ICD-10-CM

## 2022-11-15 LAB
GLUCOSE BLDC GLUCOMTR-MCNC: 143 MG/DL — HIGH (ref 70–99)
GLUCOSE BLDC GLUCOMTR-MCNC: 161 MG/DL — HIGH (ref 70–99)

## 2022-11-15 PROCEDURE — 88304 TISSUE EXAM BY PATHOLOGIST: CPT | Mod: 26

## 2022-11-15 PROCEDURE — 69604 REVJ MSTDC RSLTG TYMPANPLSTY: CPT | Mod: LT

## 2022-11-15 DEVICE — SURGIFOAM PAD 2CM X 6CM X 7MM (12-7): Type: IMPLANTABLE DEVICE | Site: LEFT | Status: FUNCTIONAL

## 2022-11-15 RX ORDER — SODIUM CHLORIDE 9 MG/ML
1000 INJECTION, SOLUTION INTRAVENOUS
Refills: 0 | Status: DISCONTINUED | OUTPATIENT
Start: 2022-11-15 | End: 2022-11-15

## 2022-11-15 RX ORDER — GLIMEPIRIDE 1 MG
1 TABLET ORAL
Qty: 0 | Refills: 0 | DISCHARGE

## 2022-11-15 RX ORDER — ONDANSETRON 8 MG/1
4 TABLET, FILM COATED ORAL ONCE
Refills: 0 | Status: DISCONTINUED | OUTPATIENT
Start: 2022-11-15 | End: 2022-11-15

## 2022-11-15 RX ORDER — MAGNESIUM OXIDE 400 MG ORAL TABLET 241.3 MG
1 TABLET ORAL
Qty: 0 | Refills: 0 | DISCHARGE

## 2022-11-15 RX ORDER — FENTANYL CITRATE 50 UG/ML
25 INJECTION INTRAVENOUS
Refills: 0 | Status: DISCONTINUED | OUTPATIENT
Start: 2022-11-15 | End: 2022-11-15

## 2022-11-15 RX ORDER — ESCITALOPRAM OXALATE 10 MG/1
1 TABLET, FILM COATED ORAL
Qty: 0 | Refills: 0 | DISCHARGE

## 2022-11-15 RX ORDER — FAMOTIDINE 10 MG/ML
1 INJECTION INTRAVENOUS
Qty: 0 | Refills: 0 | DISCHARGE

## 2022-11-15 RX ORDER — DOCUSATE SODIUM 100 MG
1 CAPSULE ORAL
Qty: 0 | Refills: 0 | DISCHARGE

## 2022-11-15 RX ADMIN — FENTANYL CITRATE 25 MICROGRAM(S): 50 INJECTION INTRAVENOUS at 14:39

## 2022-11-15 RX ADMIN — FENTANYL CITRATE 25 MICROGRAM(S): 50 INJECTION INTRAVENOUS at 14:50

## 2022-11-15 RX ADMIN — FENTANYL CITRATE 25 MICROGRAM(S): 50 INJECTION INTRAVENOUS at 14:38

## 2022-11-15 RX ADMIN — FENTANYL CITRATE 25 MICROGRAM(S): 50 INJECTION INTRAVENOUS at 14:20

## 2022-11-16 ENCOUNTER — NON-APPOINTMENT (OUTPATIENT)
Age: 62
End: 2022-11-16

## 2022-11-16 PROBLEM — K21.9 GASTRO-ESOPHAGEAL REFLUX DISEASE WITHOUT ESOPHAGITIS: Chronic | Status: ACTIVE | Noted: 2022-11-15

## 2022-11-22 ENCOUNTER — APPOINTMENT (OUTPATIENT)
Dept: OTOLARYNGOLOGY | Facility: CLINIC | Age: 62
End: 2022-11-22

## 2022-11-22 VITALS
HEIGHT: 64 IN | TEMPERATURE: 97.7 F | HEART RATE: 78 BPM | OXYGEN SATURATION: 99 % | SYSTOLIC BLOOD PRESSURE: 117 MMHG | WEIGHT: 150 LBS | BODY MASS INDEX: 25.61 KG/M2 | DIASTOLIC BLOOD PRESSURE: 73 MMHG | RESPIRATION RATE: 16 BRPM

## 2022-11-22 PROCEDURE — 99024 POSTOP FOLLOW-UP VISIT: CPT

## 2022-11-22 RX ORDER — OFLOXACIN OTIC 3 MG/ML
0.3 SOLUTION AURICULAR (OTIC) TWICE DAILY
Qty: 1 | Refills: 1 | Status: ACTIVE | COMMUNITY
Start: 2022-11-22 | End: 1900-01-01

## 2022-11-22 NOTE — PROCEDURE
[Same] : same as the Pre Op Dx. [] : Debridement of Mastoid [FreeTextEntry5] : r nl, l mastoid cavity debrided atraumatically with suction and alligator forceps

## 2022-11-22 NOTE — HISTORY OF PRESENT ILLNESS
[de-identified] : 1 week followup visit for this 63 y/o M s/p revision l modified radical mastoidectomy resulting in tympanoplasty, and exteriorization of cholesteatoma. He had pain 2 days after surgery, but states it is improving. He also had clicking sound in left ear for first few days after surgery, but no longer has this.

## 2022-11-22 NOTE — PHYSICAL EXAM
[de-identified] : l postauricular incision site healing well  [de-identified] : r nl, l mastoid cavity debrided of clot atraumatically with suction and alligator forceps, clot removed under microscope, given fresh cotton ball [Normal] : no nystagmus [FreeTextEntry2] : VII intact [de-identified] : gait steady

## 2022-11-25 NOTE — ED ADULT NURSE NOTE - DISCHARGE DATE/TIME
Vaginal delivery on 11/15/2022  Breast feeding  Has not been on antibiotics     Pt is calling thinking that she might have a yeast injection     Pt states that she started having vaginal itching that started last night and into this morning - she states that at this time she does not have any itching.     Yesterday morning a little red spotting but this morning she states she has not had any vaginal bleeding. Pt states that it is hard to tell if she has any vaginal odor as she just delivered.    Please advise        18-Oct-2022 15:07

## 2022-11-28 LAB — SURGICAL PATHOLOGY STUDY: SIGNIFICANT CHANGE UP

## 2022-12-02 ENCOUNTER — APPOINTMENT (OUTPATIENT)
Dept: OTOLARYNGOLOGY | Facility: CLINIC | Age: 62
End: 2022-12-02

## 2022-12-02 VITALS
SYSTOLIC BLOOD PRESSURE: 119 MMHG | OXYGEN SATURATION: 99 % | TEMPERATURE: 97.6 F | HEIGHT: 64 IN | WEIGHT: 150 LBS | RESPIRATION RATE: 16 BRPM | BODY MASS INDEX: 25.61 KG/M2 | HEART RATE: 79 BPM | DIASTOLIC BLOOD PRESSURE: 73 MMHG

## 2022-12-02 DIAGNOSIS — Z09 ENCOUNTER FOR FOLLOW-UP EXAMINATION AFTER COMPLETED TREATMENT FOR CONDITIONS OTHER THAN MALIGNANT NEOPLASM: ICD-10-CM

## 2022-12-02 PROCEDURE — 99024 POSTOP FOLLOW-UP VISIT: CPT

## 2022-12-03 NOTE — PHYSICAL EXAM
[de-identified] : r nl, l mastoid cavity debrided atraumatically with suction, much less granulation tissue, cauterized with silver nitrate, given fresh cotton ball [Normal] : no nystagmus [de-identified] : gait steady

## 2022-12-03 NOTE — PROCEDURE
[Same] : same as the Pre Op Dx. [] : Debridement of Mastoid [FreeTextEntry5] : r nl, l mastoid cavity debrided atraumatically with suction, cauterized with silver nitrate, given fresh cotton ball

## 2022-12-03 NOTE — ASSESSMENT
[FreeTextEntry1] : 2 weeks 3 days s/p revision l modified radical mastoidectomy resulting in tympanoplasty and exteriorization of cholesteatoma \par -l mastoid cavity debrided, cauterized with silver nitrate\par -continue dep\par -advised to avoid heavy lifting/ straining for 1 more week\par -ofloxacin drops x 2 days\par -given tie on masks to go home with as he has been using masks which pull on auricle\par -given fresh cotton ball\par RTC in 3 weeks with , or sooner for any issues

## 2022-12-03 NOTE — HISTORY OF PRESENT ILLNESS
[de-identified] : 2 weeks 3 days s/p revision l modified radical mastoidectomy resulting in tympanoplasty and exteriorization of cholesteatoma for this 61 y/o M. He has been keeping ear dry and is otherwise doing well postoperatively. He is feeling improved and reports no complaints.

## 2022-12-22 ENCOUNTER — NON-APPOINTMENT (OUTPATIENT)
Age: 62
End: 2022-12-22

## 2023-01-05 NOTE — ED ADULT NURSE NOTE - SKIN INTEGRITY
intact Implemented All Fall Risk Interventions:  [place] to call system. Call bell, personal items and telephone within reach. Instruct patient to call for assistance. Room bathroom lighting operational. Non-slip footwear when patient is off stretcher. Physically safe environment: no spills, clutter or unnecessary equipment. Stretcher in lowest position, wheels locked, appropriate side rails in place. Provide visual cue, wrist band, yellow gown, etc. Monitor gait and stability. Monitor for mental status changes and reorient to person, place, and time. Review medications for side effects contributing to fall risk. Reinforce activity limits and safety measures with patient and family.

## 2023-01-09 ENCOUNTER — APPOINTMENT (OUTPATIENT)
Dept: OTOLARYNGOLOGY | Facility: CLINIC | Age: 63
End: 2023-01-09

## 2023-01-10 ENCOUNTER — APPOINTMENT (OUTPATIENT)
Dept: NEUROLOGY | Facility: CLINIC | Age: 63
End: 2023-01-10

## 2023-01-18 ENCOUNTER — APPOINTMENT (OUTPATIENT)
Dept: OTOLARYNGOLOGY | Facility: CLINIC | Age: 63
End: 2023-01-18
Payer: MEDICARE

## 2023-01-18 ENCOUNTER — APPOINTMENT (OUTPATIENT)
Dept: OTOLARYNGOLOGY | Facility: CLINIC | Age: 63
End: 2023-01-18
Payer: MEDICAID

## 2023-01-18 VITALS
HEIGHT: 64 IN | BODY MASS INDEX: 25.61 KG/M2 | OXYGEN SATURATION: 97 % | TEMPERATURE: 97.7 F | HEART RATE: 86 BPM | WEIGHT: 150 LBS | DIASTOLIC BLOOD PRESSURE: 73 MMHG | SYSTOLIC BLOOD PRESSURE: 115 MMHG

## 2023-01-18 DIAGNOSIS — R13.14 DYSPHAGIA, PHARYNGOESOPHAGEAL PHASE: ICD-10-CM

## 2023-01-18 DIAGNOSIS — R05.9 COUGH, UNSPECIFIED: ICD-10-CM

## 2023-01-18 PROCEDURE — 92550 TYMPANOMETRY & REFLEX THRESH: CPT | Mod: 52

## 2023-01-18 PROCEDURE — 99213 OFFICE O/P EST LOW 20 MIN: CPT | Mod: 25,24

## 2023-01-18 PROCEDURE — 92557 COMPREHENSIVE HEARING TEST: CPT

## 2023-01-18 PROCEDURE — 31575 DIAGNOSTIC LARYNGOSCOPY: CPT | Mod: 79

## 2023-01-18 NOTE — PROCEDURE
[Dysphagia] : dysphagia not clearly evaluated by indirect laryngoscopy [Flexible Endoscope] : examined with the flexible endoscope [Serial Number: ___] : Serial Number: [unfilled] [Normal] : posterior cricoid area had healthy pink mucosa in the interarytenoid area and the esophageal inlet [de-identified] : done for cough and throat irritation/dysphagia with urt\par nl

## 2023-01-18 NOTE — HISTORY OF PRESENT ILLNESS
[de-identified] : 2 months s/p revision l modified radical mastoidectomy resulting in tympanoplasty and exteriorization of cholesteatoma for this 61 y/o M. He wears b hearing aids. He states he cannot hear in his left ear and he feels his hearing may be decreased in his right ear. He is having trouble with his hearing aids. He he has had a cough and throat irritation for the past 3 days and went to see his internist for this. He said he was tested for COVID which was negative but wishes to be evaluated more carefully. He states he ihas finished zpack yesterday and throat is still irritated, about the same.

## 2023-01-18 NOTE — ASSESSMENT
[FreeTextEntry1] : 2 months s/p revision l modified radical mastoidectomy resulting in tympanoplasty and exteriorization of cholesteatoma \par reassured r hearing no change; l mild decrease-  rec HA adjustment\par nghia estrada  - he explained he took zpack which ended yesterday - I explained he still has 5 more d in his system and rec to wait it out - if worsens to call and will order more abx and followup; as long as improving to wait\par o/w rtc 3 mo\par also recommended to see Dr Atkinson in pulmonary if cough persists

## 2023-01-18 NOTE — DATA REVIEWED
[de-identified] :  reviewed and compared to preop for pt-  r no change - l slight decrease - but he has had his cholesteatoma exteriorized sds 100%

## 2023-01-18 NOTE — PHYSICAL EXAM
[Normal] : no nystagmus [de-identified] : l clean and dry mastoid- examined under microscope, r nl  [de-identified] : congested [de-identified] : mildly red [de-identified] : pt ambulates with cane

## 2023-02-01 ENCOUNTER — APPOINTMENT (OUTPATIENT)
Dept: UROLOGY | Facility: CLINIC | Age: 63
End: 2023-02-01

## 2023-03-03 ENCOUNTER — APPOINTMENT (OUTPATIENT)
Dept: PHARMACY | Facility: CLINIC | Age: 63
End: 2023-03-03

## 2023-03-03 ENCOUNTER — APPOINTMENT (OUTPATIENT)
Dept: OTOLARYNGOLOGY | Facility: CLINIC | Age: 63
End: 2023-03-03
Payer: MEDICARE

## 2023-03-03 VITALS
OXYGEN SATURATION: 96 % | BODY MASS INDEX: 25.61 KG/M2 | TEMPERATURE: 97.5 F | DIASTOLIC BLOOD PRESSURE: 76 MMHG | WEIGHT: 150 LBS | HEIGHT: 64 IN | HEART RATE: 88 BPM | SYSTOLIC BLOOD PRESSURE: 126 MMHG

## 2023-03-03 PROCEDURE — 69220 CLEAN OUT MASTOID CAVITY: CPT | Mod: LT

## 2023-03-03 PROCEDURE — 99213 OFFICE O/P EST LOW 20 MIN: CPT | Mod: 25

## 2023-03-03 RX ORDER — OFLOXACIN OTIC 3 MG/ML
0.3 SOLUTION AURICULAR (OTIC) TWICE DAILY
Qty: 1 | Refills: 1 | Status: ACTIVE | COMMUNITY
Start: 2023-03-03 | End: 1900-01-01

## 2023-03-03 NOTE — PHYSICAL EXAM
[Normal] : no nystagmus [de-identified] : r nl; l yellow mucoid drainage in cavity - mastoid cavity debrided under microsocpe [de-identified] : gait steady

## 2023-03-03 NOTE — PROCEDURE
[Same] : same as the Pre Op Dx. [] : Debridement of Mastoid [FreeTextEntry6] : l mastoid cavity debrided of yellow drainage atraumatically with suction under microscope

## 2023-03-03 NOTE — HISTORY OF PRESENT ILLNESS
[de-identified] : 1.5 month followup visit for this 62 y/o M s/p l tympanoplasty with mastoidectomy approximately 3.5 months ago. 5 weeks ago he had itching in upper ear uses peroxide on cotton - last week he had severe uri saw md and told he had an ear infx and given amoxicillin. He reports he is feeling "a little better" and  uri is improved. He still has l ear drainage. Hearing remains decreased AS, here with his daughter who helps translate. -f/s/c

## 2023-03-03 NOTE — ASSESSMENT
[FreeTextEntry1] : l mastoid cavity debrided\par has cavity infx\par dep urged and explained\par ofloxacin drops added for 1 week\par cultured - will notify pt when results are available\par rtc 10d to check ear

## 2023-03-06 LAB — BACTERIA SPEC CULT: NORMAL

## 2023-03-13 ENCOUNTER — APPOINTMENT (OUTPATIENT)
Dept: PHARMACY | Facility: CLINIC | Age: 63
End: 2023-03-13
Payer: SELF-PAY

## 2023-03-13 ENCOUNTER — APPOINTMENT (OUTPATIENT)
Dept: OTOLARYNGOLOGY | Facility: CLINIC | Age: 63
End: 2023-03-13
Payer: MEDICARE

## 2023-03-13 VITALS
HEIGHT: 64 IN | BODY MASS INDEX: 26.29 KG/M2 | DIASTOLIC BLOOD PRESSURE: 76 MMHG | OXYGEN SATURATION: 97 % | TEMPERATURE: 97.2 F | WEIGHT: 154 LBS | SYSTOLIC BLOOD PRESSURE: 121 MMHG | HEART RATE: 85 BPM

## 2023-03-13 PROCEDURE — 99072 ADDL SUPL MATRL&STAF TM PHE: CPT

## 2023-03-13 PROCEDURE — 99213 OFFICE O/P EST LOW 20 MIN: CPT | Mod: 25

## 2023-03-13 PROCEDURE — V5299A: CUSTOM | Mod: NC

## 2023-03-13 NOTE — PHYSICAL EXAM
[de-identified] : r nl, l mastoid cavity debrided atraumatically with suction - scant matl [Normal] : no nystagmus [de-identified] : gait steady

## 2023-03-13 NOTE — HISTORY OF PRESENT ILLNESS
[de-identified] : 10 day followup visit for this 64 y/o M s/p l tympanoplasty with mastoidectomy approximately 4 months ago. Approximately 2 weeks ago he had a "severe" uri, saw his provider and was told he had an ear infection and was prescribed a course of amoxicillin. At last visit he was found to have a l mastoid cavity infxn. He has been using ofloaxin drops. He is c/o slight ear itchiness and pain behind his left ear. He feels it has improved. 3/6 cultures were negative.

## 2023-03-13 NOTE — ASSESSMENT
[FreeTextEntry1] : l mastoid cavity infxn appears resolved - reassured\par -s/p ofloxacin drops\par -l mastoid cavity debrided \par -3/6 cultures were negative\par -dep\par RTC in 3 months to recheck ear, sooner for any issues

## 2023-03-13 NOTE — PROCEDURE
[Same] : same as the Pre Op Dx. [] : Debridement of Mastoid [FreeTextEntry5] : r nl, l mastoid cavity debrided atraumatically

## 2023-04-26 ENCOUNTER — APPOINTMENT (OUTPATIENT)
Dept: OTOLARYNGOLOGY | Facility: CLINIC | Age: 63
End: 2023-04-26
Payer: MEDICARE

## 2023-04-26 VITALS
DIASTOLIC BLOOD PRESSURE: 74 MMHG | HEART RATE: 82 BPM | SYSTOLIC BLOOD PRESSURE: 126 MMHG | HEIGHT: 64 IN | TEMPERATURE: 97.1 F | OXYGEN SATURATION: 96 % | BODY MASS INDEX: 26.29 KG/M2 | WEIGHT: 154 LBS

## 2023-04-26 PROCEDURE — 99072 ADDL SUPL MATRL&STAF TM PHE: CPT

## 2023-04-26 PROCEDURE — 99213 OFFICE O/P EST LOW 20 MIN: CPT

## 2023-04-26 RX ORDER — TOBRAMYCIN AND DEXAMETHASONE 3; 1 MG/ML; MG/ML
0.3-0.1 SUSPENSION/ DROPS OPHTHALMIC
Qty: 1 | Refills: 3 | Status: ACTIVE | COMMUNITY
Start: 2023-04-26 | End: 1900-01-01

## 2023-04-26 NOTE — PHYSICAL EXAM
[de-identified] : r ok; l scant drainage (yellow) suctioned from cavity [Normal] : assessment of respiratory effort is normal [de-identified] : gait steady

## 2023-04-26 NOTE — HISTORY OF PRESENT ILLNESS
[de-identified] : 62 yo m s/p l cwd mastoidectomy and exteriorization - seen with phone  - he relates 2.5  week h/o l yellow ear thick discharge. 1 week ago started to use hydrogen peroxide he is still noticing l ear continued drainage. he is using qtip bid

## 2023-04-26 NOTE — ASSESSMENT
[FreeTextEntry1] : l mastoid cavity infx\par explained via \par advised to stop peroxide and qtips\par tobradex ordered - warned about hl and advised to stop and come in if he notices hearing decrease\par o.w rtc 2 weeks

## 2023-05-02 ENCOUNTER — RX RENEWAL (OUTPATIENT)
Age: 63
End: 2023-05-02

## 2023-05-08 ENCOUNTER — APPOINTMENT (OUTPATIENT)
Dept: OTOLARYNGOLOGY | Facility: CLINIC | Age: 63
End: 2023-05-08
Payer: MEDICARE

## 2023-05-08 VITALS
SYSTOLIC BLOOD PRESSURE: 107 MMHG | BODY MASS INDEX: 26.29 KG/M2 | HEIGHT: 64 IN | DIASTOLIC BLOOD PRESSURE: 67 MMHG | TEMPERATURE: 97.3 F | OXYGEN SATURATION: 98 % | HEART RATE: 78 BPM | WEIGHT: 154 LBS

## 2023-05-08 PROCEDURE — 99212 OFFICE O/P EST SF 10 MIN: CPT

## 2023-05-08 PROCEDURE — 99072 ADDL SUPL MATRL&STAF TM PHE: CPT

## 2023-05-08 NOTE — HISTORY OF PRESENT ILLNESS
[de-identified] : 12 day followup visit for this 62 y/o M s/p l cwd mastoidectomy and exteriorization. At last visit he was found to have l mastoid cavity infxn. He used tobradex drops and kept ear dry and notices resolution. He reports that cotton ball has been clear. We offered to use phone  but patient wished to hold off on this.

## 2023-05-08 NOTE — PHYSICAL EXAM
[de-identified] : r nl, l mastoid cavity clean and dry; ME clean and dry [Normal] : no nystagmus [de-identified] : gait steady

## 2023-05-08 NOTE — ASSESSMENT
[FreeTextEntry1] : l mastoid cavity infxn\par -s/p tobradex drops\par -appears resolved\par -continue dep\par RTC in 3 months; call sooner for any issues

## 2023-05-11 ENCOUNTER — NON-APPOINTMENT (OUTPATIENT)
Age: 63
End: 2023-05-11

## 2023-07-17 NOTE — CONSULT NOTE ADULT - PROBLEM SELECTOR RECOMMENDATION 5
Problem: Potential for Falls  Goal: Patient will remain free of falls  Description: INTERVENTIONS:  - Educate patient/family on patient safety including physical limitations  - Instruct patient to call for assistance with activity   - Consult OT/PT to assist with strengthening/mobility   - Keep Call bell within reach  - Keep bed low and locked with side rails adjusted as appropriate  - Keep care items and personal belongings within reach  - Initiate and maintain comfort rounds  - Make Fall Risk Sign visible to staff  - Apply yellow socks and bracelet for high fall risk patients  - Consider moving patient to room near nurses station  Outcome: Progressing
continue pre op rx and diet

## 2023-07-24 NOTE — ASU PATIENT PROFILE, ADULT - FALL HARM RISK - PT AGE POPULATION HIDDEN
Adult Information: Selecting Yes will display possible errors in your note based on the variables you have selected. This validation is only offered as a suggestion for you. PLEASE NOTE THAT THE VALIDATION TEXT WILL BE REMOVED WHEN YOU FINALIZE YOUR NOTE. IF YOU WANT TO FAX A PRELIMINARY NOTE YOU WILL NEED TO TOGGLE THIS TO 'NO' IF YOU DO NOT WANT IT IN YOUR FAXED NOTE.

## 2023-08-07 ENCOUNTER — APPOINTMENT (OUTPATIENT)
Dept: OTOLARYNGOLOGY | Facility: CLINIC | Age: 63
End: 2023-08-07
Payer: MEDICARE

## 2023-08-07 VITALS
SYSTOLIC BLOOD PRESSURE: 135 MMHG | BODY MASS INDEX: 26.29 KG/M2 | HEIGHT: 64 IN | HEART RATE: 89 BPM | TEMPERATURE: 97.3 F | WEIGHT: 154 LBS | OXYGEN SATURATION: 96 % | DIASTOLIC BLOOD PRESSURE: 78 MMHG

## 2023-08-07 PROCEDURE — 69220 CLEAN OUT MASTOID CAVITY: CPT | Mod: LT

## 2023-08-07 PROCEDURE — 99212 OFFICE O/P EST SF 10 MIN: CPT | Mod: 25

## 2023-08-07 NOTE — ASSESSMENT
[FreeTextEntry1] : no evidence of growth of cholesteatoma clinically stable new ct ordered for next visit rtc 3 months

## 2023-08-07 NOTE — HISTORY OF PRESENT ILLNESS
[de-identified] : 3 month followup visit for this 64 y/o M s/p l cwd mastoidectomy and exteriorization for cholesteatoma 9 mo ago. He reports that he has been doing well, with no -pain or drainage. At last visit, he had been treated for cavity infx. .

## 2023-08-07 NOTE — PROCEDURE
[Same] : same as the Pre Op Dx. [] : Debridement of Mastoid [FreeTextEntry6] : l mastoid caivty debrided atraumatically of copious squamousmatl

## 2023-08-07 NOTE — PHYSICAL EXAM
[de-identified] : r ok; l mastoid cavity debrided of squamous matl atraumatically.  [Normal] : no nystagmus [FreeTextEntry2] : VII intact b [de-identified] : gait steady

## 2023-08-11 ENCOUNTER — APPOINTMENT (OUTPATIENT)
Dept: OTOLARYNGOLOGY | Facility: CLINIC | Age: 63
End: 2023-08-11

## 2023-08-11 ENCOUNTER — APPOINTMENT (OUTPATIENT)
Dept: PHARMACY | Facility: CLINIC | Age: 63
End: 2023-08-11

## 2023-08-11 ENCOUNTER — NON-APPOINTMENT (OUTPATIENT)
Age: 63
End: 2023-08-11

## 2023-08-11 VITALS
WEIGHT: 154 LBS | SYSTOLIC BLOOD PRESSURE: 128 MMHG | HEART RATE: 75 BPM | OXYGEN SATURATION: 97 % | HEIGHT: 64 IN | TEMPERATURE: 97.3 F | DIASTOLIC BLOOD PRESSURE: 76 MMHG | BODY MASS INDEX: 26.29 KG/M2

## 2023-08-18 ENCOUNTER — APPOINTMENT (OUTPATIENT)
Dept: OTOLARYNGOLOGY | Facility: CLINIC | Age: 63
End: 2023-08-18
Payer: MEDICARE

## 2023-08-18 VITALS
TEMPERATURE: 97.3 F | DIASTOLIC BLOOD PRESSURE: 76 MMHG | HEART RATE: 81 BPM | OXYGEN SATURATION: 97 % | WEIGHT: 160 LBS | SYSTOLIC BLOOD PRESSURE: 121 MMHG | BODY MASS INDEX: 27.31 KG/M2 | HEIGHT: 64 IN

## 2023-08-18 DIAGNOSIS — H60.8X2 OTHER OTITIS EXTERNA, LEFT EAR: ICD-10-CM

## 2023-08-18 PROCEDURE — 99213 OFFICE O/P EST LOW 20 MIN: CPT

## 2023-08-18 RX ORDER — MOMETASONE FUROATE 1 MG/G
0.1 CREAM TOPICAL
Qty: 1 | Refills: 3 | Status: ACTIVE | COMMUNITY
Start: 2023-08-18 | End: 1900-01-01

## 2023-08-19 PROBLEM — H60.8X2 CHRONIC REACTIVE OTITIS EXTERNA OF LEFT EAR: Status: ACTIVE | Noted: 2023-08-19

## 2023-08-19 NOTE — PHYSICAL EXAM
[de-identified] : r eac and tm nl; l mild eczema at entrance to eac; cavity clear [Normal] : assessment of respiratory effort is normal [de-identified] : gait steady

## 2023-08-19 NOTE — HISTORY OF PRESENT ILLNESS
[de-identified] : 11 d follow up appt for this 64 yo m who is experiencing itch at the entrance to the l eac with his hearing aid. He has had cwd mastoidectomy and exteriorization for cholesteatoma. Was here a few d ago without appt but could not wait to be seen.

## 2023-08-19 NOTE — ASSESSMENT
[FreeTextEntry1] : chronic l o.e.  explained elocon prescribed as it is bothersome, explained how to use, cautioned not to get into cavity or HA rtc 3 mo and as needed

## 2023-08-24 ENCOUNTER — RX RENEWAL (OUTPATIENT)
Age: 63
End: 2023-08-24

## 2023-08-24 RX ORDER — ESCITALOPRAM OXALATE 5 MG/1
5 TABLET ORAL
Qty: 30 | Refills: 2 | Status: ACTIVE | COMMUNITY
Start: 2021-11-18 | End: 1900-01-01

## 2023-09-06 ENCOUNTER — EMERGENCY (EMERGENCY)
Facility: HOSPITAL | Age: 63
LOS: 1 days | Discharge: HOME | End: 2023-09-06
Admitting: EMERGENCY MEDICINE
Payer: COMMERCIAL

## 2023-09-06 VITALS
WEIGHT: 160.06 LBS | OXYGEN SATURATION: 96 % | DIASTOLIC BLOOD PRESSURE: 75 MMHG | HEART RATE: 91 BPM | SYSTOLIC BLOOD PRESSURE: 131 MMHG | RESPIRATION RATE: 16 BRPM | HEIGHT: 64 IN | TEMPERATURE: 98 F

## 2023-09-06 DIAGNOSIS — Z79.84 LONG TERM (CURRENT) USE OF ORAL HYPOGLYCEMIC DRUGS: ICD-10-CM

## 2023-09-06 DIAGNOSIS — Z98.890 OTHER SPECIFIED POSTPROCEDURAL STATES: Chronic | ICD-10-CM

## 2023-09-06 DIAGNOSIS — F41.9 ANXIETY DISORDER, UNSPECIFIED: ICD-10-CM

## 2023-09-06 DIAGNOSIS — Z90.49 ACQUIRED ABSENCE OF OTHER SPECIFIED PARTS OF DIGESTIVE TRACT: Chronic | ICD-10-CM

## 2023-09-06 DIAGNOSIS — E78.5 HYPERLIPIDEMIA, UNSPECIFIED: ICD-10-CM

## 2023-09-06 DIAGNOSIS — I10 ESSENTIAL (PRIMARY) HYPERTENSION: ICD-10-CM
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
## 2023-09-06 DIAGNOSIS — Z23 ENCOUNTER FOR IMMUNIZATION: ICD-10-CM

## 2023-09-06 DIAGNOSIS — L03.032 CELLULITIS OF LEFT TOE: ICD-10-CM

## 2023-09-06 DIAGNOSIS — J45.909 UNSPECIFIED ASTHMA, UNCOMPLICATED: ICD-10-CM

## 2023-09-06 DIAGNOSIS — Z41.9 ENCOUNTER FOR PROCEDURE FOR PURPOSES OTHER THAN REMEDYING HEALTH STATE, UNSPECIFIED: Chronic | ICD-10-CM
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
## 2023-09-06 DIAGNOSIS — E11.9 TYPE 2 DIABETES MELLITUS WITHOUT COMPLICATIONS: ICD-10-CM

## 2023-09-06 PROCEDURE — 99284 EMERGENCY DEPT VISIT MOD MDM: CPT

## 2023-09-06 PROCEDURE — 90715 TDAP VACCINE 7 YRS/> IM: CPT

## 2023-09-06 PROCEDURE — 99283 EMERGENCY DEPT VISIT LOW MDM: CPT | Mod: 25

## 2023-09-06 PROCEDURE — 73630 X-RAY EXAM OF FOOT: CPT

## 2023-09-06 PROCEDURE — 73630 X-RAY EXAM OF FOOT: CPT | Mod: 26,LT

## 2023-09-06 PROCEDURE — 90471 IMMUNIZATION ADMIN: CPT

## 2023-09-06 RX ORDER — TETANUS TOXOID, REDUCED DIPHTHERIA TOXOID AND ACELLULAR PERTUSSIS VACCINE, ADSORBED 5; 2.5; 8; 8; 2.5 [IU]/.5ML; [IU]/.5ML; UG/.5ML; UG/.5ML; UG/.5ML
0.5 SUSPENSION INTRAMUSCULAR ONCE
Refills: 0 | Status: COMPLETED | OUTPATIENT
Start: 2023-09-06 | End: 2023-09-06

## 2023-09-06 RX ORDER — ACETAMINOPHEN 500 MG
975 TABLET ORAL ONCE
Refills: 0 | Status: COMPLETED | OUTPATIENT
Start: 2023-09-06 | End: 2023-09-06

## 2023-09-06 RX ADMIN — TETANUS TOXOID, REDUCED DIPHTHERIA TOXOID AND ACELLULAR PERTUSSIS VACCINE, ADSORBED 0.5 MILLILITER(S): 5; 2.5; 8; 8; 2.5 SUSPENSION INTRAMUSCULAR at 12:06

## 2023-09-06 RX ADMIN — Medication 975 MILLIGRAM(S): at 12:06

## 2023-09-06 RX ADMIN — Medication 1 TABLET(S): at 12:05

## 2023-09-06 NOTE — ED PROVIDER NOTE - PATIENT PORTAL LINK FT
You can access the FollowMyHealth Patient Portal offered by [facility] by registering at the following website: [email or web address]. By joining [employer]’s FollowMyHealth portal, you will also be able to view your health information using other applications (apps) compatible with our system.

## 2023-09-06 NOTE — ED ADULT NURSE NOTE - OBJECTIVE STATEMENT
Pt presents to ED c/o pain, redness and swelling to right first toe, d/t dog bite. Pt has hx DMII, worries the toe is infected. Bite has started to scab, no bleeding, pus or drainage noted. Toe is red, swollen, warm to the touch. Pt has difficulty flexing it d/t swelling. Denies loss of sensation or numbness to affected area or extremity. Denies fever or chills.

## 2023-09-06 NOTE — ED PROVIDER NOTE - CLINICAL SUMMARY MEDICAL DECISION MAKING FREE TEXT BOX
62 yo M with pmh of DM, asthma, HLD, HTN, and anxiety c/o dog bite to L great toe and small bite to second toe 3 sustained 4 days ago by his own dog. Pt accidentally stepped on the dog while barefoot. dog UTD with its vaccines.  Denies fever, chills, numbness, tingling.  L great toe- 2 small scabbed bite marks to dorsal distal phalanx just inferior to nail, 1 small bite johnathan to 2nd toe lateral aspect. afebrile. +erythema to bottom of great toe and forefoot 6x6cm with ttp. NV intact. no fluctuance. area marked with pen. XR.... pt started on augmentin, will return in 2 days for wound check. return precautions discussed. tetanus updated.

## 2023-09-06 NOTE — ED PROVIDER NOTE - NSFOLLOWUPINSTRUCTIONS_ED_ALL_ED_FT
Take antibiotics as prescribed  Return to ED in 2 days for wound check. Return to ED sooner for worsening pain, fever, chills, swelling, redness or any other concerning symptoms     Cellulitis    Cellulitis is a skin infection caused by bacteria. This condition occurs most often in the arms and lower legs but can occur anywhere over the body. Symptoms include redness, swelling, warm skin, tenderness, and chills/fever. If you were prescribed an antibiotic medicine, take it as told by your health care provider. Do not stop taking the antibiotic even if you start to feel better.    SEEK IMMEDIATE MEDICAL CARE IF YOU HAVE ANY OF THE FOLLOWING SYMPTOMS: worsening fever, red streaks coming from affected area, vomiting or diarrhea, or dizziness/lightheadedness. Take antibiotics as prescribed  Return to ED in 2 days for wound check. Return to ED sooner for worsening pain, fever, chills, swelling, redness or any other concerning symptoms     Your preliminary results of your XR are normal. These will officially be read by the attending radiologist later today or tomorrow. If there are any abnormalities you will be called.    Cellulitis    Cellulitis is a skin infection caused by bacteria. This condition occurs most often in the arms and lower legs but can occur anywhere over the body. Symptoms include redness, swelling, warm skin, tenderness, and chills/fever. If you were prescribed an antibiotic medicine, take it as told by your health care provider. Do not stop taking the antibiotic even if you start to feel better.    SEEK IMMEDIATE MEDICAL CARE IF YOU HAVE ANY OF THE FOLLOWING SYMPTOMS: worsening fever, red streaks coming from affected area, vomiting or diarrhea, or dizziness/lightheadedness.

## 2023-09-06 NOTE — ED PROVIDER NOTE - PHYSICAL EXAMINATION
CONSTITUTIONAL: Well-appearing;  in no apparent distress.   HEAD: Normocephalic; atraumatic.   EYES: PERRL; EOM intact; conjunctiva and sclera clear  ENT: normal nose; no rhinorrhea; normal pharynx with no erythema or lesions.   NECK: Supple; non-tender;   CARDIOVASCULAR: rrr,  RESPIRATORY: Breathing easily;   MSK: FROM at all extremities, normal tone   EXT: No cyanosis or edema; N/V intact  SKIN: L great toe- 2 small scabbed bite marks to dorsal distal phalanx just inferior to nail, 1 small bite johnathan to 2nd toe lateral aspect. +erythema to bottom of great toe and forefoot 6x6cm with ttp. NV intact. no fluctuance

## 2023-09-06 NOTE — ED ADULT NURSE NOTE - NSFALLUNIVINTERV_ED_ALL_ED
Bed/Stretcher in lowest position, wheels locked, appropriate side rails in place/Call bell, personal items and telephone in reach/Instruct patient to call for assistance before getting out of bed/chair/stretcher/Non-slip footwear applied when patient is off stretcher/[place] to call system/Physically safe environment - no spills, clutter or unnecessary equipment/Purposeful proactive rounding/Room/bathroom lighting operational, light cord in reach

## 2023-09-06 NOTE — ED PROVIDER NOTE - OBJECTIVE STATEMENT
64 yo M with pmh of DM, asthma, HLD, HTN, and anxiety c/o dog bite to L great toe and small bite to second toe 3 sustained 4 days ago by his own dog. Pt accidentally stepped on the dog while barefoot. dog UTD with its vaccines. Pt c/o pain and swelling to the toe and pain that radiates up his leg especially with walking. Pt using antibiotic ointment and taking naproxen. Denies fever, chills, numbness, tingling. unknown last tetanus

## 2023-09-06 NOTE — ED ADULT TRIAGE NOTE - CHIEF COMPLAINT QUOTE
Pt presents to ED C/O increased pain to L toe and foot S/P dog bite by own dog UTD with vaccines. Pt has proof of pet vaccines at bedside. Denies fevers. Hx DM.

## 2023-09-08 ENCOUNTER — EMERGENCY (EMERGENCY)
Facility: HOSPITAL | Age: 63
LOS: 1 days | Discharge: HOME | End: 2023-09-08
Attending: EMERGENCY MEDICINE | Admitting: EMERGENCY MEDICINE
Payer: MEDICARE

## 2023-09-08 VITALS
SYSTOLIC BLOOD PRESSURE: 131 MMHG | DIASTOLIC BLOOD PRESSURE: 82 MMHG | TEMPERATURE: 98 F | HEART RATE: 85 BPM | OXYGEN SATURATION: 96 % | HEIGHT: 64 IN | RESPIRATION RATE: 18 BRPM

## 2023-09-08 DIAGNOSIS — Z98.890 OTHER SPECIFIED POSTPROCEDURAL STATES: Chronic | ICD-10-CM

## 2023-09-08 DIAGNOSIS — L03.032 CELLULITIS OF LEFT TOE: ICD-10-CM

## 2023-09-08 DIAGNOSIS — Z90.49 ACQUIRED ABSENCE OF OTHER SPECIFIED PARTS OF DIGESTIVE TRACT: ICD-10-CM

## 2023-09-08 DIAGNOSIS — F32.A DEPRESSION, UNSPECIFIED: ICD-10-CM

## 2023-09-08 DIAGNOSIS — Z87.438 PERSONAL HISTORY OF OTHER DISEASES OF MALE GENITAL ORGANS: ICD-10-CM

## 2023-09-08 DIAGNOSIS — Z79.84 LONG TERM (CURRENT) USE OF ORAL HYPOGLYCEMIC DRUGS: ICD-10-CM

## 2023-09-08 DIAGNOSIS — Z90.49 ACQUIRED ABSENCE OF OTHER SPECIFIED PARTS OF DIGESTIVE TRACT: Chronic | ICD-10-CM

## 2023-09-08 DIAGNOSIS — I10 ESSENTIAL (PRIMARY) HYPERTENSION: ICD-10-CM

## 2023-09-08 DIAGNOSIS — J45.909 UNSPECIFIED ASTHMA, UNCOMPLICATED: ICD-10-CM

## 2023-09-08 DIAGNOSIS — M19.90 UNSPECIFIED OSTEOARTHRITIS, UNSPECIFIED SITE: ICD-10-CM

## 2023-09-08 DIAGNOSIS — E78.5 HYPERLIPIDEMIA, UNSPECIFIED: ICD-10-CM

## 2023-09-08 DIAGNOSIS — K21.9 GASTRO-ESOPHAGEAL REFLUX DISEASE WITHOUT ESOPHAGITIS: ICD-10-CM

## 2023-09-08 DIAGNOSIS — E11.9 TYPE 2 DIABETES MELLITUS WITHOUT COMPLICATIONS: ICD-10-CM

## 2023-09-08 DIAGNOSIS — F41.9 ANXIETY DISORDER, UNSPECIFIED: ICD-10-CM

## 2023-09-08 DIAGNOSIS — Z41.9 ENCOUNTER FOR PROCEDURE FOR PURPOSES OTHER THAN REMEDYING HEALTH STATE, UNSPECIFIED: Chronic | ICD-10-CM

## 2023-09-08 PROCEDURE — 99283 EMERGENCY DEPT VISIT LOW MDM: CPT

## 2023-09-08 PROCEDURE — 99282 EMERGENCY DEPT VISIT SF MDM: CPT

## 2023-09-08 NOTE — ED PROVIDER NOTE - OBJECTIVE STATEMENT
64 y/o male w/ hx DM, asthma, HLD, HTN, and anxiety presents for a wound check.  Pt seen in ED 2 days ago for cellulitis to L great toe and plantar surface of forefoot, s/p dog bite to L great toe and small bite to second toe 6 days ago by his own dog.  Has been taking Augmentin as prescribed.  States feels about the same/ slightly better.  Not worsened.   Has been taking naproxen as well. Denies fever, chills, numbness, tingling to foot.

## 2023-09-08 NOTE — ED PROVIDER NOTE - NS ED ATTENDING STATEMENT MOD
This was a shared visit with the MCKENZIE. I reviewed and verified the documentation and independently performed the documented:

## 2023-09-08 NOTE — ED ADULT NURSE NOTE - OBJECTIVE STATEMENT
endorses slight improvement in R-1st toe erythema, slight improvement in swelling, taking oral antibiotics as prescribed, here for wound check

## 2023-09-08 NOTE — ED PROVIDER NOTE - CLINICAL SUMMARY MEDICAL DECISION MAKING FREE TEXT BOX
64 y/o male w/ hx DM, asthma, HLD, HTN, and anxiety presents for a wound check.  Pt seen in ED 2 days ago for cellulitis to L great toe and plantar surface of forefoot, s/p dog bite to L great toe and small bite to second toe 6 days ago by his own dog.  Has been taking Augmentin as prescribed.  States feels about the same/ slightly better.  Not worsened.   Has been taking naproxen as well. Denies fever, chills, numbness, tingling to foot.  Erythema/ ttp all within marked borders.  Pt states essentially the same/ slightly better  NVI. No signs abscess  Advised pt to continue taking po abx.  Borders remarked on foot.  Advised to return in 2 days for additional wound check to make sure cellulitis resolving

## 2023-09-08 NOTE — ED PROVIDER NOTE - ATTENDING APP SHARED VISIT CONTRIBUTION OF CARE
63 m w DM, HTN, high lipids, asthma- seen 2 days ago after dog bit his toe  on augmentin- compliant  redness to area- no sig progression  no induration/fluctuance  IMP- Cellulitis  cont po abx on day 2/3

## 2023-09-08 NOTE — ED PROVIDER NOTE - PATIENT PORTAL LINK FT
You can access the FollowMyHealth Patient Portal offered by [facility] by registering at the following website: [email or web address]. By joining [employer]’s FollowMyHealth portal, you will also be able to view your health information using other applications (apps) compatible with our system.

## 2023-09-08 NOTE — ED PROVIDER NOTE - NSFOLLOWUPINSTRUCTIONS_ED_ALL_ED_FT
Thank you for visiting [facility] Emergency Department.      We saw you today for cellulitis.    Please continue taking Augmentin as prescribed.  You may continue taking Naproxen.  You may also take Tylenol for pain.  Please return to ER in 2 days for wound check.  Return sooner if redness spreads outside of marked borders, pain worsens, or if you develop fevers/chills.    Please know that no emergency visit is complete without follow-up with your primary care provider in 1 week.  Please bring copies of all discharge papers and results and show to your doctor.      Please continue taking all previous medications as instructed unless we discussed otherwise.     I appreciated your patience and hope you feel better soon.     Return to ER immediately if you develop fevers, chills, chest pain, shortness of breath, worsening and/or any concerning symptoms.

## 2023-09-08 NOTE — ED PROVIDER NOTE - PHYSICAL EXAMINATION
CONSTITUTIONAL: Awake, alert.  Nontoxic, no acute distress.    HEAD: Normocephalic, atraumatic.    MUSCULOSKELETAL: L great toe- 2 small scabbed bite marks to dorsal distal phalanx just inferior to nail, 1 small bite johnathan to 2nd toe lateral aspect. +erythema to bottom of great toe and forefoot 6x6cm with ttp, within marked borders.  No fluctuance. FROM b/l lower extremities.  5/5 strength b/l lower ext.  Sensation and motor function grossly intact.  Strong equal peripheral pulses b/l.   Cap refill < 2 b/l upper and lower ext.  All compartments soft.    NEUROLOGICAL:  Patient is alert, oriented x person, place and time.    PSYCH: Appropriate mood and affect. Good judgment and insight.

## 2023-09-08 NOTE — ED PROVIDER NOTE - NSICDXPASTMEDICALHX_GEN_ALL_CORE_FT
PAST MEDICAL HISTORY:  Allergic rhinitis     Arthritis     Asthma     BPH (benign prostatic hyperplasia)     Cataract     Cough variant asthma pt denies    Depression     DM (diabetes mellitus) type 2    ED (erectile dysfunction)     Esophageal reflux     Essential tremor     GERD (gastroesophageal reflux disease)     HLD (hyperlipidemia)     HTN (hypertension)     Hypertensive retinopathy     Hypertriglyceridemia     Rectal bleeding

## 2023-09-11 ENCOUNTER — INPATIENT (INPATIENT)
Facility: HOSPITAL | Age: 63
LOS: 0 days | Discharge: HOME | DRG: 603 | End: 2023-09-12
Attending: STUDENT IN AN ORGANIZED HEALTH CARE EDUCATION/TRAINING PROGRAM | Admitting: STUDENT IN AN ORGANIZED HEALTH CARE EDUCATION/TRAINING PROGRAM
Payer: MEDICARE

## 2023-09-11 VITALS
RESPIRATION RATE: 18 BRPM | WEIGHT: 158.95 LBS | SYSTOLIC BLOOD PRESSURE: 136 MMHG | HEIGHT: 64 IN | OXYGEN SATURATION: 96 % | TEMPERATURE: 98 F | DIASTOLIC BLOOD PRESSURE: 80 MMHG | HEART RATE: 97 BPM

## 2023-09-11 DIAGNOSIS — Z98.890 OTHER SPECIFIED POSTPROCEDURAL STATES: Chronic | ICD-10-CM

## 2023-09-11 DIAGNOSIS — Z41.9 ENCOUNTER FOR PROCEDURE FOR PURPOSES OTHER THAN REMEDYING HEALTH STATE, UNSPECIFIED: Chronic | ICD-10-CM

## 2023-09-11 DIAGNOSIS — L03.90 CELLULITIS, UNSPECIFIED: ICD-10-CM

## 2023-09-11 DIAGNOSIS — Z90.49 ACQUIRED ABSENCE OF OTHER SPECIFIED PARTS OF DIGESTIVE TRACT: Chronic | ICD-10-CM

## 2023-09-11 DIAGNOSIS — Z29.9 ENCOUNTER FOR PROPHYLACTIC MEASURES, UNSPECIFIED: ICD-10-CM

## 2023-09-11 DIAGNOSIS — F41.9 ANXIETY DISORDER, UNSPECIFIED: ICD-10-CM
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
## 2023-09-11 DIAGNOSIS — I10 ESSENTIAL (PRIMARY) HYPERTENSION: ICD-10-CM

## 2023-09-11 DIAGNOSIS — E78.5 HYPERLIPIDEMIA, UNSPECIFIED: ICD-10-CM

## 2023-09-11 DIAGNOSIS — E11.9 TYPE 2 DIABETES MELLITUS WITHOUT COMPLICATIONS: ICD-10-CM

## 2023-09-11 LAB
ALBUMIN SERPL ELPH-MCNC: 4.5 G/DL — SIGNIFICANT CHANGE UP (ref 3.3–5)
ALP SERPL-CCNC: 77 U/L — SIGNIFICANT CHANGE UP (ref 40–120)
ALT FLD-CCNC: 17 U/L — SIGNIFICANT CHANGE UP (ref 10–45)
ANION GAP SERPL CALC-SCNC: 10 MMOL/L — SIGNIFICANT CHANGE UP (ref 5–17)
AST SERPL-CCNC: 18 U/L — SIGNIFICANT CHANGE UP (ref 10–40)
BASOPHILS # BLD AUTO: 0.05 K/UL — SIGNIFICANT CHANGE UP (ref 0–0.2)
BASOPHILS NFR BLD AUTO: 0.9 % — SIGNIFICANT CHANGE UP (ref 0–2)
BILIRUB SERPL-MCNC: 0.2 MG/DL — SIGNIFICANT CHANGE UP (ref 0.2–1.2)
BUN SERPL-MCNC: 17 MG/DL — SIGNIFICANT CHANGE UP (ref 7–23)
CALCIUM SERPL-MCNC: 10.5 MG/DL — SIGNIFICANT CHANGE UP (ref 8.4–10.5)
CHLORIDE SERPL-SCNC: 103 MMOL/L — SIGNIFICANT CHANGE UP (ref 96–108)
CO2 SERPL-SCNC: 26 MMOL/L — SIGNIFICANT CHANGE UP (ref 22–31)
CREAT SERPL-MCNC: 0.93 MG/DL — SIGNIFICANT CHANGE UP (ref 0.5–1.3)
CRP SERPL-MCNC: 43.8 MG/L — HIGH (ref 0–4)
EGFR: 92 ML/MIN/1.73M2 — SIGNIFICANT CHANGE UP
EOSINOPHIL # BLD AUTO: 0.08 K/UL — SIGNIFICANT CHANGE UP (ref 0–0.5)
EOSINOPHIL NFR BLD AUTO: 1.4 % — SIGNIFICANT CHANGE UP (ref 0–6)
ERYTHROCYTE [SEDIMENTATION RATE] IN BLOOD: 35 MM/HR — HIGH
GLUCOSE BLDC GLUCOMTR-MCNC: 217 MG/DL — HIGH (ref 70–99)
GLUCOSE SERPL-MCNC: 230 MG/DL — HIGH (ref 70–99)
HCT VFR BLD CALC: 39.7 % — SIGNIFICANT CHANGE UP (ref 39–50)
HGB BLD-MCNC: 13.8 G/DL — SIGNIFICANT CHANGE UP (ref 13–17)
IMM GRANULOCYTES NFR BLD AUTO: 0.4 % — SIGNIFICANT CHANGE UP (ref 0–0.9)
LYMPHOCYTES # BLD AUTO: 1.54 K/UL — SIGNIFICANT CHANGE UP (ref 1–3.3)
LYMPHOCYTES # BLD AUTO: 27.6 % — SIGNIFICANT CHANGE UP (ref 13–44)
MCHC RBC-ENTMCNC: 32.6 PG — SIGNIFICANT CHANGE UP (ref 27–34)
MCHC RBC-ENTMCNC: 34.8 GM/DL — SIGNIFICANT CHANGE UP (ref 32–36)
MCV RBC AUTO: 93.9 FL — SIGNIFICANT CHANGE UP (ref 80–100)
MONOCYTES # BLD AUTO: 0.66 K/UL — SIGNIFICANT CHANGE UP (ref 0–0.9)
MONOCYTES NFR BLD AUTO: 11.8 % — SIGNIFICANT CHANGE UP (ref 2–14)
NEUTROPHILS # BLD AUTO: 3.23 K/UL — SIGNIFICANT CHANGE UP (ref 1.8–7.4)
NEUTROPHILS NFR BLD AUTO: 57.9 % — SIGNIFICANT CHANGE UP (ref 43–77)
NRBC # BLD: 0 /100 WBCS — SIGNIFICANT CHANGE UP (ref 0–0)
PLATELET # BLD AUTO: 313 K/UL — SIGNIFICANT CHANGE UP (ref 150–400)
POTASSIUM SERPL-MCNC: 4.2 MMOL/L — SIGNIFICANT CHANGE UP (ref 3.5–5.3)
POTASSIUM SERPL-SCNC: 4.2 MMOL/L — SIGNIFICANT CHANGE UP (ref 3.5–5.3)
PROT SERPL-MCNC: 7.9 G/DL — SIGNIFICANT CHANGE UP (ref 6–8.3)
RBC # BLD: 4.23 M/UL — SIGNIFICANT CHANGE UP (ref 4.2–5.8)
RBC # FLD: 12.5 % — SIGNIFICANT CHANGE UP (ref 10.3–14.5)
SODIUM SERPL-SCNC: 139 MMOL/L — SIGNIFICANT CHANGE UP (ref 135–145)
WBC # BLD: 5.58 K/UL — SIGNIFICANT CHANGE UP (ref 3.8–10.5)
WBC # FLD AUTO: 5.58 K/UL — SIGNIFICANT CHANGE UP (ref 3.8–10.5)

## 2023-09-11 PROCEDURE — 93010 ELECTROCARDIOGRAM REPORT: CPT

## 2023-09-11 PROCEDURE — 73620 X-RAY EXAM OF FOOT: CPT | Mod: 26,LT

## 2023-09-11 PROCEDURE — 99285 EMERGENCY DEPT VISIT HI MDM: CPT

## 2023-09-11 PROCEDURE — 99223 1ST HOSP IP/OBS HIGH 75: CPT

## 2023-09-11 RX ORDER — PIPERACILLIN AND TAZOBACTAM 4; .5 G/20ML; G/20ML
3.38 INJECTION, POWDER, LYOPHILIZED, FOR SOLUTION INTRAVENOUS ONCE
Refills: 0 | Status: COMPLETED | OUTPATIENT
Start: 2023-09-11 | End: 2023-09-11

## 2023-09-11 RX ORDER — ONDANSETRON 8 MG/1
4 TABLET, FILM COATED ORAL EVERY 8 HOURS
Refills: 0 | Status: DISCONTINUED | OUTPATIENT
Start: 2023-09-11 | End: 2023-09-12

## 2023-09-11 RX ORDER — VANCOMYCIN HCL 1 G
1250 VIAL (EA) INTRAVENOUS ONCE
Refills: 0 | Status: COMPLETED | OUTPATIENT
Start: 2023-09-11 | End: 2023-09-11

## 2023-09-11 RX ORDER — CHOLESTYRAMINE 4 G/9G
4 POWDER, FOR SUSPENSION ORAL
Qty: 0 | Refills: 0 | DISCHARGE

## 2023-09-11 RX ORDER — TRAZODONE HCL 50 MG
1 TABLET ORAL
Refills: 0 | DISCHARGE

## 2023-09-11 RX ORDER — ROSUVASTATIN CALCIUM 5 MG/1
1 TABLET ORAL
Refills: 0 | DISCHARGE

## 2023-09-11 RX ORDER — HYDROCODONE BITARTRATE AND ACETAMINOPHEN 7.5; 325 MG/15ML; MG/15ML
1 SOLUTION ORAL
Qty: 0 | Refills: 0 | DISCHARGE

## 2023-09-11 RX ORDER — EMPAGLIFLOZIN 10 MG/1
0 TABLET, FILM COATED ORAL
Qty: 0 | Refills: 0 | DISCHARGE

## 2023-09-11 RX ORDER — EMPAGLIFLOZIN 10 MG/1
1 TABLET, FILM COATED ORAL
Refills: 0 | DISCHARGE

## 2023-09-11 RX ORDER — LANOLIN ALCOHOL/MO/W.PET/CERES
3 CREAM (GRAM) TOPICAL AT BEDTIME
Refills: 0 | Status: DISCONTINUED | OUTPATIENT
Start: 2023-09-11 | End: 2023-09-12

## 2023-09-11 RX ORDER — HEPARIN SODIUM 5000 [USP'U]/ML
5000 INJECTION INTRAVENOUS; SUBCUTANEOUS EVERY 8 HOURS
Refills: 0 | Status: DISCONTINUED | OUTPATIENT
Start: 2023-09-11 | End: 2023-09-12

## 2023-09-11 RX ORDER — ACETAMINOPHEN 500 MG
650 TABLET ORAL EVERY 6 HOURS
Refills: 0 | Status: DISCONTINUED | OUTPATIENT
Start: 2023-09-11 | End: 2023-09-12

## 2023-09-11 RX ORDER — METFORMIN HYDROCHLORIDE 850 MG/1
1 TABLET ORAL
Refills: 0 | DISCHARGE

## 2023-09-11 RX ORDER — CEFUROXIME AXETIL 250 MG
1 TABLET ORAL
Qty: 0 | Refills: 0 | DISCHARGE

## 2023-09-11 RX ADMIN — PIPERACILLIN AND TAZOBACTAM 200 GRAM(S): 4; .5 INJECTION, POWDER, LYOPHILIZED, FOR SOLUTION INTRAVENOUS at 19:11

## 2023-09-11 RX ADMIN — Medication 250 MILLIGRAM(S): at 19:21

## 2023-09-11 NOTE — H&P ADULT - NSHPLABSRESULTS_GEN_ALL_CORE
LABS:                        13.8   5.58  )-----------( 313      ( 11 Sep 2023 17:43 )             39.7     09-11    139  |  103  |  17  ----------------------------<  230<H>  4.2   |  26  |  0.93    Ca    10.5      11 Sep 2023 17:43    TPro  7.9  /  Alb  4.5  /  TBili  0.2  /  DBili  x   /  AST  18  /  ALT  17  /  AlkPhos  77  09-11      Urinalysis Basic - ( 11 Sep 2023 17:43 )    Color: x / Appearance: x / SG: x / pH: x  Gluc: 230 mg/dL / Ketone: x  / Bili: x / Urobili: x   Blood: x / Protein: x / Nitrite: x   Leuk Esterase: x / RBC: x / WBC x   Sq Epi: x / Non Sq Epi: x / Bacteria: x          RADIOLOGY & ADDITIONAL TESTS:    MEDICATIONS  (STANDING):    MEDICATIONS  (PRN):  acetaminophen     Tablet .. 650 milliGRAM(s) Oral every 6 hours PRN Temp greater or equal to 38C (100.4F), Mild Pain (1 - 3)  aluminum hydroxide/magnesium hydroxide/simethicone Suspension 30 milliLiter(s) Oral every 4 hours PRN Dyspepsia  melatonin 3 milliGRAM(s) Oral at bedtime PRN Insomnia  ondansetron Injectable 4 milliGRAM(s) IV Push every 8 hours PRN Nausea and/or Vomiting

## 2023-09-11 NOTE — ED PROVIDER NOTE - CLINICAL SUMMARY MEDICAL DECISION MAKING FREE TEXT BOX
63 year old male with history of DM, asthma, HLD, HTN, and anxiety, recent ED visits for L 1st toe pain s/p dog bite ~10d ago, returning to ED for wound check. 63 year old male with history of DM, asthma, HLD, HTN, and anxiety, recent ED visits for L 1st toe pain s/p dog bite ~10d ago, returning to ED for wound check. Well appearing overall here, afebrile, exam with signs of cellulitis but not worsening per pt and marker border drawn at previous ED visit. Given persistent of symptoms on current abx regimen, anticipate need for broadened coverage but will also check labs/inflam markers, rpt XR, and request podiatry eval

## 2023-09-11 NOTE — ED ADULT TRIAGE NOTE - CHIEF COMPLAINT QUOTE
pt seen in ER for L great toe wound 3 days ago and told to return to ER for L great toe wound check. pt currently taking antibiotics for wound. denies any fevers.

## 2023-09-11 NOTE — H&P ADULT - NSHPPHYSICALEXAM_GEN_ALL_CORE
PHYSICAL EXAM:  Constitutional: NAD, comfortable in bed.  HEENT: NC/AT, PERRLA, EOMI, no conjunctival pallor or scleral icterus, MMM  Neck: Supple, no JVD  Respiratory: CTA B/L. No w/r/r.   Cardiovascular: RRR, normal S1 and S2, no m/r/g.   Gastrointestinal: +BS, soft NTND, no guarding or rebound tenderness, no palpable masses   Extremities: wwp; LLE hallux erythematous, no purulent drainage, no bleeding, no open wounds. No crepitus noted.   Vascular: Pulses equal and strong throughout.   Neurological: AAOx3, no CN deficits, strength and sensation intact throughout.   Skin: No gross skin abnormalities or rashes

## 2023-09-11 NOTE — H&P ADULT - ATTENDING COMMENTS
62 yo M with PMHx HTN, HLD, DM, asthma, recent ED visit x2 for same sx s/p dog bite (Augmentin) representing to ED from home with concern for ongoing L toe cellulitis admitted for IV abx.       #L toe cellulitis – Seen in ED x3d for same sx since dog bite 9/6 with concern that sx are not improving on PO Augmentin. s/p Tetanus shot 9/6. On current presentation, not meeting SIRS/sepsis apart from mild tachycardia. ESR/CRP mildly elevated. XR in ED showing no acute fracture or dislocation and no evidence of OM or soft tissue gas. Podiatry consulted in ED, agree with low suspicion for OM. On my exam, mild area of erythema/discoloration surrounding L 1st toe (demarcated) with visible healing dog bite. No visible drainage or open ulcers. Continue Unasyn.     Agree with remainder of resident plan as above.

## 2023-09-11 NOTE — H&P ADULT - PROBLEM SELECTOR PLAN 2
Bit by his own dog 11d prior to admission, developed cellulitis. Pt got tetanus vaccine 9/6, dog is rabies vaccinated.     - Address cellulitis as above

## 2023-09-11 NOTE — H&P ADULT - PROBLEM SELECTOR PLAN 3
Home meds Glimepiride 4mg Q12, Metformin 1g Q12, Januvia 100mg Qd, Jardiance 25 Qd  Med rec as per pt    - mISS inpt for now  - Basal bolus off 24 hr mISS requirements

## 2023-09-11 NOTE — ED ADULT TRIAGE NOTE - GLASGOW COMA SCALE: EYE OPENING, MLM
Cardiovascular Surgery Progress Note    Name: Guerrero Ann  MRN: [id number]  : 1971  Admit Date: 2020  7:37 PM  Procedure:  Procedure(s) and Anesthesia Type:     * CABG, WITH ENDOSCOPIC VEIN PROCUREMENT- X3 - General     * ECHOCARDIOGRAM, TRANSESOPHAGEAL - General  8 Day Post-Op    Vitals:  Patient Vitals for the past 8 hrs:   Temp Monitored Temp 2 SpO2 O2 Delivery Device O2 (LPM) Pulse Resp BP Weight   10/03/20 0800 36.9 °C (98.4 °F) 36.9 °C (98.4 °F) 92 % Silicone Nasal Cannula 6 87 (!) 30 -- --   10/03/20 0750 -- 36.9 °C (98.4 °F) (!) 86 % -- -- 89 (!) 23 (!) 90/60 --   10/03/20 0745 -- -- (!) 83 % Silicone Nasal Cannula 6 -- -- -- --   10/03/20 0700 -- 37 °C (98.6 °F) -- -- -- 92 (!) 29 103/65 --   10/03/20 0600 -- 36.2 °C (97.2 °F) 95 % None - Room Air -- 87 (!) 28 102/64 --   10/03/20 0545 -- 37 °C (98.6 °F) 97 % -- -- 87 (!) 29 -- 70.2 kg (154 lb 12.2 oz)   10/03/20 0430 -- 36.9 °C (98.4 °F) 97 % -- -- 87 (!) 26 105/60 --   10/03/20 0415 -- 36.9 °C (98.4 °F) 96 % -- -- 86 (!) 29 -- --   10/03/20 0400 -- -- 96 % None - Room Air -- 89 18 -- --   10/03/20 0345 -- -- 99 % -- -- 86 (!) 28 -- --   10/03/20 0330 -- -- 97 % -- -- 88 (!) 28 -- --   10/03/20 0315 -- -- 98 % -- -- 90 (!) 21 118/59 --   10/03/20 0300 -- -- 97 % -- -- 90 (!) 30 -- --   10/03/20 0245 -- -- 97 % -- -- 90 (!) 29 -- --   10/03/20 0230 -- -- 99 % -- -- 90 (!) 28 -- --   10/03/20 0215 -- 35.6 °C (96.1 °F) 98 % -- -- 91 (!) 27 106/60 --   10/03/20 0200 -- 37.2 °C (99 °F) 98 % Silicone Nasal Cannula 3 90 (!) 29 102/56 --   10/03/20 0145 -- 37.2 °C (99 °F) 98 % -- -- 93 (!) 29 (!) 94/69 --   10/03/20 0130 -- 37.3 °C (99.1 °F) 97 % -- -- 94 (!) 26 102/60 --     Temp (24hrs), Av.9 °C (98.4 °F), Min:36.9 °C (98.4 °F), Max:36.9 °C (98.4 °F)    Respiratory:    Respiration: (!) 30, Pulse Oximetry: 92 %     Chest Tube Drains:          Fluids:    Intake/Output Summary (Last 24 hours) at 10/3/2020 4246  Last data  filed at 10/3/2020 0600  Gross per 24 hour   Intake 4393.11 ml   Output 2100 ml   Net 2293.11 ml     Admit weight: Weight: 72.6 kg (160 lb)  Current weight: Weight: 70.2 kg (154 lb 12.2 oz) (10/03/20 0545)    Labs:  Recent Labs     10/02/20  2050 10/03/20  0015 10/03/20  0325   WBC 9.9 9.9 9.7   RBC 2.34* 2.38* 2.51*   HEMOGLOBIN 7.4* 7.4* 7.9*   HEMATOCRIT 23.0* 23.1* 24.4*   MCV 98.7* 97.1 97.2   MCH 31.2 31.1 31.5   MCHC 31.6* 32.0* 32.4*   RDW 49.1 49.4 48.2   PLATELETCT 343 357 387   MPV 11.2 11.0 11.0     Recent Labs     10/01/20  0548 10/01/20  2230 10/03/20  0015 10/03/20  0325   NEUTSPOLYS 61.70 69.60 73.00* 75.20*   LYMPHOCYTES 17.40* 14.80* 8.70* 10.80*   MONOCYTES 14.80* 9.60 5.20 12.70   EOSINOPHILS 1.70 1.70 0.90 0.30   BASOPHILS 0.90 0.00 0.00 0.20   RBCMORPHOLO Present Present Present  --      Recent Labs     10/01/20  2230 10/02/20  2050 10/03/20  0325   SODIUM 136 136 136   POTASSIUM 3.8 3.2* 3.0*   CHLORIDE 96 94* 95*   CO2 16* 17* 18*   GLUCOSE 201* 194* 202*   BUN 62* 95* 96*   CREATININE 2.37* 4.32* 3.87*   CALCIUM 9.2 7.4* 7.3*     Medications:  • [START ON 10/4/2020] pantoprazole  40 mg     • calcium CHLORIDE  1 g     • lidocaine  30 mL     • K+ Scale: Goal of 4.5  1 Each     • heparin  5,000 Units     • acetaminophen  975 mg     • linezolid (ZYVOX) IV  600 mg Stopped (10/03/20 0640)   • piperacillin-tazobactam  4.5 g Stopped (10/03/20 0605)   • LR       • Pharmacy  1 Each     • aspirin  81 mg     • atorvastatin  80 mg     • senna-docusate  2 Tab      And   • polyethylene glycol/lytes  1 Packet      And   • magnesium hydroxide  30 mL     • clopidogrel  75 mg     • insulin lispro  4 Units     • insulin lispro  0-15 Units     • MD Alert...Adult ICU Electrolyte Replacement per Pharmacy       • lidocaine  1 Patch     • Pharmacy Consult Request  1 Each        Ordered Medications:    ASA - Yes    Plavix - Yes    Post-operative Beta Blockers - Yes    Ace Inhibitor - No; contraindicated because of  Increased creatinine/CKD    Statin - Yes    Exam:   Review of Systems   Unable to perform ROS: Mental status change     Physical Exam  Constitutional:       General: He is not in acute distress.     Appearance: He is well-developed. He is not diaphoretic.   Neck:      Musculoskeletal: Neck supple.   Cardiovascular:      Rate and Rhythm: Normal rate and regular rhythm.      Heart sounds: Normal heart sounds. No murmur. No friction rub. No gallop.    Pulmonary:      Effort: Pulmonary effort is normal. No respiratory distress.      Breath sounds: Examination of the right-lower field reveals decreased breath sounds. Examination of the left-lower field reveals decreased breath sounds. Decreased breath sounds present. No wheezing or rales.   Abdominal:      General: There is no distension.      Palpations: Abdomen is soft.      Tenderness: There is no abdominal tenderness.   Skin:     General: Skin is warm and dry.      Comments: Sternum- prevena dressing  SVG sites- c/d/i   Neurological:      Mental Status: He is alert and oriented to person, place, and time.       Quality Measures:   Quality-Core Measures   Reviewed items::  EKG reviewed, Labs reviewed, Medications reviewed and Radiology images reviewed  Gomez catheter::  Critically Ill - Requiring Accurate Measurement of Urinary Output  Central line in place:  Concentrated IV drugs  DVT prophylaxis pharmacological::  Heparin  DVT prophylaxis - mechanical:  SCDs  Ulcer Prophylaxis::  Yes    Assessment/Plan:  POD 1 HDS, SR, Acute blood loss anemia- s/p transfusion- keep mediastinal tubes, gently diurese, d/c gomez  POD 2 HTN- inc lisinopril, SR/ST- change to metoprolol, acute blood loss anemia s/p transfusion- watch, d/c mediastinal tubes, urine retention- replace gomez/start flomax, cont diuresis  POD 3  HDS, SR, neuro right  weaker than left gazes to right, wounds CDI, abdomen S/NT, fluid balance negative, wt still up from admit.  Plan:  MRI today, decrease ace for  permissive hypertension, dc gabapentin, BB, Statin, IS/ambulate. CPM.  POD 4  HDS but hypertensive, SR, neuro answers questions but delayed.  Gaze more center.  EEG normal, MRI pending, wounds CDI, abdomen distended, fluid balance negative, wt down.  Plan:  Restart ace, IM getting abdominal study, BB, Statin, IS/ambulate. CPM.  POD 5  HDS, ST, neuro unchanged, wounds CDI, abdomen S/NT, fluid balance negative, wt down.  Failed swallow evaluation.  Hypertensive.  Pain issues.  Plan:  core trak, switch BB to coreg, ACE, Statin, add norco, IS/ambulate. CPM.  POD 6  HDS, SR, neuro unchanged, wounds CDI, abdomen S/NT, fluid balance negative, wt stable.  Plan:  Awaiting rehab authorization, BB, ACE, Statin, IS/ambulate. CPM.  POD 7 HDS, SR, Febrile- most likely aspiration pneumonia- started on rocephin- d/w pharmacy will change to unasyn, ileus- 2 liters of residual last night- start reglan/supp- if no response try relistor, Elevated creatinine- stop lisinopril/lasix- switch lovenox to heparin- start fluids 75 cc/hr, replace gomez for strict I&O, neuro- follows with slowed response, minimal speech  POD 8  HDS, SR, neuro intact, wounds CDI, abdomen firm and distended CT scan shows ileus, fluid balance negative, wt down.  Creatine trending down   Plan:  NG to decompress abd, 1 unit blood, Statin, IS/ambulate. CPM.    Active Hospital Problems    Diagnosis   • Septic shock (HCC) [A41.9, R65.21]     Priority: High   • Aspiration pneumonia (HCC) [J69.0]     Priority: Medium   • Ileus (HCC) [K56.7]     Priority: Medium   • Lacunar infarct, acute ([facility]) [I63.81]     Priority: Medium   • Cardioembolic stroke (HCC) [I63.9]     Priority: Medium   • Altered mental status [R41.82]     Priority: Medium   • SONNY (acute kidney injury) ([facility]) [N17.9]     Priority: Medium   • NSTEMI (non-ST elevated myocardial infarction) ([facility]) [I21.4]     Priority: Medium   • CAD (coronary artery disease) [I25.10]     Priority: Medium   • Type 2 diabetes  mellitus with complication, without long-term current use of insulin (HCC) [E11.8]     Priority: Medium   • Essential hypertension [I10]     Priority: Medium   • Normochromic normocytic anemia [D64.9]     Priority: Low   • Hypokalemia [E87.6]     Priority: Low        (E4) spontaneous

## 2023-09-11 NOTE — ED PROVIDER NOTE - PHYSICAL EXAMINATION
Gen - NAD; well-appearing; A+Ox3   HEENT - NCAT, EOM  Neck - supple  Resp - CTAB, no increased WOB  CV -  RRR, no m/r/g  Abd - soft, NT, ND; no guarding or rebound  Back - no midline, paraspinous, or CVA tenderness  MSK - FROM of b/l UE and LE, no gross deformities  Extrem - no LE edema/erythema/tenderness; palpable DP pulses  Neuro - no focal motor or sensation deficits  Skin - warm, well perfused; erythema/tenderness to L dorsal 1st toe distal to IP joint, no fluctuance/induration/crepitus/lesions

## 2023-09-11 NOTE — H&P ADULT - ASSESSMENT
63M PMHx of DM, asthma, HLD, HTN, and anxiety p/w nonpurulent cellulitis 2/2 dog bite 11d prior to admission. Iso outpt treatment failure on augmentin, a/f IV abx and serial podiatry eval.  63M PMHx of DM, asthma, HLD, HTN, and anxiety p/w nonpurulent cellulitis 2/2 dog bite 11d prior to admission. Iso ?outpt treatment failure on augmentin, a/f IV abx and serial podiatry eval.

## 2023-09-11 NOTE — CONSULT NOTE ADULT - SUBJECTIVE AND OBJECTIVE BOX
Attending: Dr. Guerra     Patient is a 63y old  Male with PMHx of DM, asthma, HLD, HTN, and anxiety who complains of a dog bite on his L hallux pain which occurred about 10 days ago. He describes the pain as a constant throbbing pain which is moderate in nature. He has been seen by [facility] ED about 3 times and notes that his symptoms have not improved. He was given Augmentin 875 PO BID and was compliant with his antibiotic regimen. He denies any drainage or malodor but states that his dizziness, swelling and erythema has not improved since the accident. He is not being followed by a podiatrist at this moment.     Review of systems negative except per HPI and as stated below  General:	 no weakness; no fevers, no chills  Skin/Breast: no rash  Respiratory and Thorax: + SOB, no cough  Cardiovascular:	No chest pain  Gastrointestinal:	 no nausea, vomiting , diarrhea  Genitourinary:	no dysuria, no difficulty urinating, no hematuria  Musculoskeletal:	no weakness, no joint swelling/pain  Neurological:	no focal weakness/numbness  Endocrine:	no polyuria, no polydipsia    PAST MEDICAL & SURGICAL HISTORY:  Hypertriglyceridemia      Allergic rhinitis      Rectal bleeding      Hypertensive retinopathy      Cataract      ED (erectile dysfunction)      HLD (hyperlipidemia)      HTN (hypertension)      DM (diabetes mellitus)  type 2      Esophageal reflux      Essential tremor      Cough variant asthma  pt denies      BPH (benign prostatic hyperplasia)      Depression      Arthritis      Asthma      GERD (gastroesophageal reflux disease)      Surgery, elective  ankle ligament reconstruction      S/P appendectomy      S/P knee surgery  arthroscopy      H/O colonoscopy      H/O esophagogastroduodenoscopy      H/O knee surgery      H/O shoulder surgery        Home Medications:  cefuroxime 500 mg oral tablet: 1 tab(s) orally every 12 hours (15 Nov 2022 09:19)  cholestyramine: 4 milligram(s) orally 2 times a day (15 Nov 2022 09:19)  docusate sodium 100 mg oral capsule: 1 cap(s) orally 2 times a day (15 Nov 2022 09:19)  escitalopram 5 mg oral tablet: 1 tab(s) orally once a day (15 Nov 2022 09:19)  famotidine 40 mg oral tablet: 1 tab(s) orally once a day (at bedtime) (15 Nov 2022 09:19)  glimepiride 4 mg oral tablet: 1 tab(s) orally 2 times a day (15 Nov 2022 09:19)  hydrocodone-acetaminophen 5 mg-325 mg oral tablet: 1 tab(s) orally every 6 hours (15 Nov 2022 09:19)  Jardiance:  (15 Nov 2022 09:19)  magnesium oxide 400 mg oral tablet: 1 tab(s) orally once a day (15 Nov 2022 09:19)    Allergies    No Known Allergies    Intolerances      FAMILY HISTORY:    Social History:       LABS              Vital Signs Last 24 Hrs  T(C): 36.8 (11 Sep 2023 15:57), Max: 36.8 (11 Sep 2023 15:57)  T(F): 98.2 (11 Sep 2023 15:57), Max: 98.2 (11 Sep 2023 15:57)  HR: 97 (11 Sep 2023 15:57) (97 - 97)  BP: 136/80 (11 Sep 2023 15:57) (136/80 - 136/80)  BP(mean): --  RR: 18 (11 Sep 2023 15:57) (18 - 18)  SpO2: 96% (11 Sep 2023 15:57) (96% - 96%)    Parameters below as of 11 Sep 2023 15:57  Patient On (Oxygen Delivery Method): room air        PHYSICAL EXAM  General: NAD, AA0x3    Lower Extremity Focused:  Vasc: 2/4 PT/DP b/l , +1 non-pitting edema noted at the L hallux, - drainage, -  malodor,  Derm: Erythema noted at the distal aspect of the hallux, closed wound consistent with an animal bite seen on the dorsal aspect of L hallux, surgical scar noted on the dorsal aspect of L MPJ  Neuro: Protective sensation is intact   MSK: Pain on palpation on the plantar aspect of the hallux, Mild HAV noted on the R hallux     RADIOLOGY  Wet read: No acute signs of fracture or GAS noted on imaging. Unchanged from previous XR taken on 9/6             Attending: Dr. Guerra     Patient is a 63y old  Male with PMHx of DM, asthma, HLD, HTN, and anxiety who complains of a dog bite on his L hallux pain which occurred about 10 days ago. He describes the pain as a constant throbbing pain which is moderate in nature. He has been seen by [facility] ED about 3 times and notes that his symptoms have not improved. He was given Augmentin 875 PO BID and was compliant with his antibiotic regimen. The pt denies any drainage or malodor but states that his dizziness, swelling and erythema has not improved since the accident. He is not being followed by a podiatrist at this moment. His last tetanus shot was during his last ED visit on 9/6.     Review of systems negative except per HPI and as stated below  General:	 no weakness; no fevers, no chills  Skin/Breast: no rash  Respiratory and Thorax: + SOB, no cough  Cardiovascular:	No chest pain  Gastrointestinal:	 no nausea, vomiting , diarrhea  Genitourinary:	no dysuria, no difficulty urinating, no hematuria  Musculoskeletal:	no weakness, no joint swelling/pain  Neurological:	no focal weakness/numbness  Endocrine:	no polyuria, no polydipsia    PAST MEDICAL & SURGICAL HISTORY:  Hypertriglyceridemia      Allergic rhinitis      Rectal bleeding      Hypertensive retinopathy      Cataract      ED (erectile dysfunction)      HLD (hyperlipidemia)      HTN (hypertension)      DM (diabetes mellitus)  type 2      Esophageal reflux      Essential tremor      Cough variant asthma  pt denies      BPH (benign prostatic hyperplasia)      Depression      Arthritis      Asthma      GERD (gastroesophageal reflux disease)      Surgery, elective  ankle ligament reconstruction      S/P appendectomy      S/P knee surgery  arthroscopy      H/O colonoscopy      H/O esophagogastroduodenoscopy      H/O knee surgery      H/O shoulder surgery        Home Medications:  cefuroxime 500 mg oral tablet: 1 tab(s) orally every 12 hours (15 Nov 2022 09:19)  cholestyramine: 4 milligram(s) orally 2 times a day (15 Nov 2022 09:19)  docusate sodium 100 mg oral capsule: 1 cap(s) orally 2 times a day (15 Nov 2022 09:19)  escitalopram 5 mg oral tablet: 1 tab(s) orally once a day (15 Nov 2022 09:19)  famotidine 40 mg oral tablet: 1 tab(s) orally once a day (at bedtime) (15 Nov 2022 09:19)  glimepiride 4 mg oral tablet: 1 tab(s) orally 2 times a day (15 Nov 2022 09:19)  hydrocodone-acetaminophen 5 mg-325 mg oral tablet: 1 tab(s) orally every 6 hours (15 Nov 2022 09:19)  Jardiance:  (15 Nov 2022 09:19)  magnesium oxide 400 mg oral tablet: 1 tab(s) orally once a day (15 Nov 2022 09:19)    Allergies    No Known Allergies    Intolerances      FAMILY HISTORY:    Social History:       LABS              Vital Signs Last 24 Hrs  T(C): 36.8 (11 Sep 2023 15:57), Max: 36.8 (11 Sep 2023 15:57)  T(F): 98.2 (11 Sep 2023 15:57), Max: 98.2 (11 Sep 2023 15:57)  HR: 97 (11 Sep 2023 15:57) (97 - 97)  BP: 136/80 (11 Sep 2023 15:57) (136/80 - 136/80)  BP(mean): --  RR: 18 (11 Sep 2023 15:57) (18 - 18)  SpO2: 96% (11 Sep 2023 15:57) (96% - 96%)    Parameters below as of 11 Sep 2023 15:57  Patient On (Oxygen Delivery Method): room air        PHYSICAL EXAM  General: NAD, AA0x3    Lower Extremity Focused:  Vasc: 2/4 PT/DP b/l , +1 non-pitting edema noted at the L hallux, - drainage, -  malodor,  Derm: Erythema noted at the distal aspect of the hallux, closed wound consistent with an animal bite seen on the dorsal aspect of L hallux, surgical scar noted on the dorsal aspect of L MPJ  Neuro: Protective sensation is intact   MSK: Pain on palpation on the plantar aspect of the hallux, Mild HAV noted on the R hallux     RADIOLOGY  Wet read: No acute signs of fracture or GAS noted on imaging. Unchanged from previous XR taken on 9/6             Attending: Dr. Guerra     Patient is a 63y old  Male with PMHx of DM, asthma, HLD, HTN, and anxiety who complains of a dog bite on his L hallux pain, moderate in nature which occurred about 10 days ago. He describes the pain as a constant and throbbing. He has been seen by [facility] ED about 3 times and notes that his symptoms have not improved. He was given Augmentin 875 PO BID and was compliant with his antibiotic regimen. The pt denies any drainage or malodor but states that his dizziness, swelling and erythema has not improved since the accident. He is not being followed by a podiatrist at this moment. His last tetanus shot was during his last ED visit on 9/6.     Review of systems negative except per HPI and as stated below  General:	 no weakness; no fevers, no chills  Skin/Breast: no rash  Respiratory and Thorax: + SOB, no cough  Cardiovascular:	No chest pain  Gastrointestinal:	 no nausea, vomiting , diarrhea  Genitourinary:	no dysuria, no difficulty urinating, no hematuria  Musculoskeletal:	no weakness, no joint swelling/pain  Neurological:	no focal weakness/numbness  Endocrine:	no polyuria, no polydipsia    PAST MEDICAL & SURGICAL HISTORY:  Hypertriglyceridemia      Allergic rhinitis      Rectal bleeding      Hypertensive retinopathy      Cataract      ED (erectile dysfunction)      HLD (hyperlipidemia)      HTN (hypertension)      DM (diabetes mellitus)  type 2      Esophageal reflux      Essential tremor      Cough variant asthma  pt denies      BPH (benign prostatic hyperplasia)      Depression      Arthritis      Asthma      GERD (gastroesophageal reflux disease)      Surgery, elective  ankle ligament reconstruction      S/P appendectomy      S/P knee surgery  arthroscopy      H/O colonoscopy      H/O esophagogastroduodenoscopy      H/O knee surgery      H/O shoulder surgery        Home Medications:  cefuroxime 500 mg oral tablet: 1 tab(s) orally every 12 hours (15 Nov 2022 09:19)  cholestyramine: 4 milligram(s) orally 2 times a day (15 Nov 2022 09:19)  docusate sodium 100 mg oral capsule: 1 cap(s) orally 2 times a day (15 Nov 2022 09:19)  escitalopram 5 mg oral tablet: 1 tab(s) orally once a day (15 Nov 2022 09:19)  famotidine 40 mg oral tablet: 1 tab(s) orally once a day (at bedtime) (15 Nov 2022 09:19)  glimepiride 4 mg oral tablet: 1 tab(s) orally 2 times a day (15 Nov 2022 09:19)  hydrocodone-acetaminophen 5 mg-325 mg oral tablet: 1 tab(s) orally every 6 hours (15 Nov 2022 09:19)  Jardiance:  (15 Nov 2022 09:19)  magnesium oxide 400 mg oral tablet: 1 tab(s) orally once a day (15 Nov 2022 09:19)    Allergies    No Known Allergies    Intolerances      FAMILY HISTORY:    Social History:       LABS              Vital Signs Last 24 Hrs  T(C): 36.8 (11 Sep 2023 15:57), Max: 36.8 (11 Sep 2023 15:57)  T(F): 98.2 (11 Sep 2023 15:57), Max: 98.2 (11 Sep 2023 15:57)  HR: 97 (11 Sep 2023 15:57) (97 - 97)  BP: 136/80 (11 Sep 2023 15:57) (136/80 - 136/80)  BP(mean): --  RR: 18 (11 Sep 2023 15:57) (18 - 18)  SpO2: 96% (11 Sep 2023 15:57) (96% - 96%)    Parameters below as of 11 Sep 2023 15:57  Patient On (Oxygen Delivery Method): room air        PHYSICAL EXAM  General: NAD, AA0x3    Lower Extremity Focused:  Vasc: 2/4 PT/DP b/l , +1 non-pitting edema noted at the L hallux, - drainage, -  malodor,  Derm: Erythema noted at the distal aspect of the hallux, closed wound consistent with an animal bite seen on the dorsal aspect of L hallux, surgical scar noted on the dorsal aspect of L MPJ  Neuro: Protective sensation is intact   MSK: Pain on palpation on the plantar aspect of the hallux, Mild HAV noted on the R hallux     RADIOLOGY  Wet read: No acute signs of fracture or GAS noted on imaging. Unchanged from previous XR taken on 9/6

## 2023-09-11 NOTE — H&P ADULT - NSHPREVIEWOFSYSTEMS_GEN_ALL_CORE
Patient denies h/n/v/d, fever, chills, cp, palpitations, sob, abd pain, leg swelling, rashes, dysuria, and changes in BM.     Pt reports persistent but improved pain in his L foot over the area of his dog bite.

## 2023-09-11 NOTE — ED ADULT NURSE NOTE - OBJECTIVE STATEMENT
Patient presents to the ED for a wound check to the L foot. Patient reports that he was bitten by a dog 10 days ago and the pain and swelling hasn't improved.

## 2023-09-11 NOTE — H&P ADULT - PROBLEM SELECTOR PLAN 1
Nonpurulent cellulitis 2/2 dog bite, tx failure outpt s/p 6d course of augmentin pt reports adherence.     - Podiatry consulted, appreciate recs  - Iso animal bite and tx failure, TMP-SMX 1DS tablet Q12 and Metronidazole 500 Q8 Nonpurulent cellulitis 2/2 dog bite, ?tx failure outpt s/p 6d course of augmentin pt reports adherence. Physical exam on day of admission relatively benign, pt does not meet SIRS criteria    - Podiatry consulted, appreciate recs  - Unasyn 3g Q6 IV; better penetration noted in IV formulation

## 2023-09-11 NOTE — ED PROVIDER NOTE - PATIENT'S PREFERRED PRONOUN
Pt temp is decreasing. Dr. Fishman contacted advised of status. To continue plan of care. Will continue to monitor.    Him/He

## 2023-09-11 NOTE — CONSULT NOTE ADULT - ASSESSMENT
********INCOMPLETE PLAN ************  Patient is a 63y old  Male with PMHx of DM, asthma, HLD, HTN, and anxiety who comes in for a dog bite on his L 1st toe pain which occurred about 10 days ago. He has been seen by [facility] ED about 3 times and notes that his symptoms have not improved. At the time of consult VSS, WBC ESR and CRP are pending. XRs were unchanged from previous.     Plan:     XR were reviewed and discussed with pt  Recommend Doxycycline 100 mg PO BID & Clindamycin 450mg PO TID for 14 days or Bactrim DS BID with Clindamycin 450mg TID for 14 days  Recommend that the pt follow up with Dr. Guerra for further management.       Patient should follow up with Dr. River Guerra within 1 week of discharge.    Office information:          [place] Address- [address], [place] [address] Phone: [phone]            Patient is a 63y old  Male with PMHx of DM, asthma, HLD, HTN, and anxiety who comes in for a dog bite on his L 1st toe pain which occurred about 10 days ago. He has been seen by [facility] ED about 3 times and notes that his symptoms have not improved. At the time of consult VSS, WBC 5.58 CRP 43.8 ESR is pending. XRs were unchanged from previous. Because the pt is has failed PO antibiotics outpt and is a high risk individual due to his DM, podiatry recommends admission and close monitoring.     Plan:     XR were reviewed and discussed with pt  Recommend pt be admitted to medicine   Suggests that the pt receive broad spectrum IV antibiotics   Monitor erythema progression         Patient should follow up with Dr. River Guerra within 1 week of discharge.    Office information:          [place] Address- [address], [place] [address] Phone: [phone]            Patient is a 63y old  Male with PMHx of DM, asthma, HLD, HTN, and anxiety who comes in for a dog bite on his L 1st toe pain which occurred about 10 days ago. He has been seen by [facility] ED about 3 times and notes that his symptoms have not improved. His last tetanus shot was during last ED visit on 9/6. At the time of consult VSS, WBC 5.58 CRP 43.8 ESR is pending. XRs were unchanged from previous. Because the pt is has failed PO antibiotics and is a high risk individual due to his DM, podiatry recommends admission and close monitoring.     Plan:     XR were reviewed and discussed with pt  Recommend pt be admitted to medicine   Suggests that the pt receive broad spectrum IV antibiotics   Monitor erythema progression     Plan d/w attending, Podiatry following.    Patient is a 63y old  Male with PMHx of DM, asthma, HLD, HTN, and anxiety who comes in for a dog bite on his L hallux which occurred about 10 days ago. He has been seen by [facility] ED about 3 times and notes that his symptoms have not improved. His last tetanus shot was during last ED visit on 9/6. At the time of consult VSS, WBC 5.58 CRP 43.8 ESR is pending. XRs were unchanged from previous. Because the pt is has failed PO antibiotics and is a high risk individual due to his DM, podiatry recommends admission and close monitoring.     Plan:     XR were reviewed and discussed with pt  Recommend pt be admitted to medicine   Pt should receive broad spectrum IV antibiotics   Monitor erythema progression     Plan d/w attending, Podiatry following.

## 2023-09-11 NOTE — H&P ADULT - HISTORY OF PRESENT ILLNESS
63M PMHx of DM, asthma, HLD, HTN, and anxiety p/w dog bite on his L hallux 11 days ago. He describes the pain as a constant throbbing pain which is moderate in nature. He has been seen by [facility] ED two prior times and NV'ed on augmentin 875 Q12 and is adherent to; however pt notes that his symptoms have not improved.  Pt received tetanus shot in ED on 9/6.  The pt denies any drainage or malodor but states that his dizziness, swelling and erythema has not improved since the accident. Pt evaluated by podiatry in ED who recommended admission and IV abx.   ED Vitals: T98.2F afebrile since admission, HR 97, /80, SpO2 96  ED Labs: CBC WNL including WBC 5.6 with normal differential. CMP WNL. ESR 35, CRP 44  ED Studies: XR foot: No acute fx, dislocation, evidence of OM or soft tissue gas  ED Interventions: Vancomycin 1250 x1, Zosyn 3.375      63M PMHx of DM, asthma, HLD, HTN, and anxiety p/w dog bite (his own dog) on his L hallux 11 days prior to admission. He describes the pain as a moderate constant throbbing pain. He has been seen by [facility] ED two prior times and IL'ed on augmentin 875 Q12 and is adherent to; however pt notes that his symptoms have not improved. Hedenies any drainage or malodor but states that his swelling and erythema has not improved since the accident which prompted this ED evaluation. Pt received tetanus shot in ED on 9/6, his dog is up to date with all vaccines including rabies vaccine. Pt evaluated by podiatry in ED who recommended admission and IV abx.   ED Vitals: T98.2F afebrile since admission, HR 97, /80, SpO2 96  ED Labs: CBC WNL including WBC 5.6 with normal differential. CMP WNL. ESR 35, CRP 44  ED Studies: XR foot: No acute fx, dislocation, evidence of OM or soft tissue gas  ED Interventions: Vancomycin 1250 x1, Zosyn 3.375

## 2023-09-11 NOTE — ED ADULT NURSE NOTE - NSFALLUNIVINTERV_ED_ALL_ED
Bed/Stretcher in lowest position, wheels locked, appropriate side rails in place/Call bell, personal items and telephone in reach/Instruct patient to call for assistance before getting out of bed/chair/stretcher/Non-slip footwear applied when patient is off stretcher/[place] to call system/Physically safe environment - no spills, clutter or unnecessary equipment/Purposeful proactive rounding/Room/bathroom lighting operational, light cord in reach

## 2023-09-12 ENCOUNTER — TRANSCRIPTION ENCOUNTER (OUTPATIENT)
Age: 63
End: 2023-09-12

## 2023-09-12 VITALS
HEART RATE: 79 BPM | DIASTOLIC BLOOD PRESSURE: 67 MMHG | OXYGEN SATURATION: 98 % | RESPIRATION RATE: 18 BRPM | TEMPERATURE: 99 F | SYSTOLIC BLOOD PRESSURE: 103 MMHG

## 2023-09-12 LAB
A1C WITH ESTIMATED AVERAGE GLUCOSE RESULT: 7.4 % — HIGH (ref 4–5.6)
ALBUMIN SERPL ELPH-MCNC: 4 G/DL — SIGNIFICANT CHANGE UP (ref 3.3–5)
ALP SERPL-CCNC: 57 U/L — SIGNIFICANT CHANGE UP (ref 40–120)
ALT FLD-CCNC: 12 U/L — SIGNIFICANT CHANGE UP (ref 10–45)
ANION GAP SERPL CALC-SCNC: 14 MMOL/L — SIGNIFICANT CHANGE UP (ref 5–17)
ANISOCYTOSIS BLD QL: SLIGHT — SIGNIFICANT CHANGE UP
AST SERPL-CCNC: 14 U/L — SIGNIFICANT CHANGE UP (ref 10–40)
BASOPHILS # BLD AUTO: 0 K/UL — SIGNIFICANT CHANGE UP (ref 0–0.2)
BASOPHILS NFR BLD AUTO: 0 % — SIGNIFICANT CHANGE UP (ref 0–2)
BILIRUB SERPL-MCNC: 0.4 MG/DL — SIGNIFICANT CHANGE UP (ref 0.2–1.2)
BUN SERPL-MCNC: 18 MG/DL — SIGNIFICANT CHANGE UP (ref 7–23)
BURR CELLS BLD QL SMEAR: PRESENT — SIGNIFICANT CHANGE UP
CALCIUM SERPL-MCNC: 9.3 MG/DL — SIGNIFICANT CHANGE UP (ref 8.4–10.5)
CHLORIDE SERPL-SCNC: 104 MMOL/L — SIGNIFICANT CHANGE UP (ref 96–108)
CO2 SERPL-SCNC: 22 MMOL/L — SIGNIFICANT CHANGE UP (ref 22–31)
CREAT SERPL-MCNC: 0.77 MG/DL — SIGNIFICANT CHANGE UP (ref 0.5–1.3)
EGFR: 101 ML/MIN/1.73M2 — SIGNIFICANT CHANGE UP
EOSINOPHIL # BLD AUTO: 0.21 K/UL — SIGNIFICANT CHANGE UP (ref 0–0.5)
EOSINOPHIL NFR BLD AUTO: 5.2 % — SIGNIFICANT CHANGE UP (ref 0–6)
ESTIMATED AVERAGE GLUCOSE: 166 MG/DL — HIGH (ref 68–114)
GIANT PLATELETS BLD QL SMEAR: PRESENT — SIGNIFICANT CHANGE UP
GLUCOSE BLDC GLUCOMTR-MCNC: 117 MG/DL — HIGH (ref 70–99)
GLUCOSE BLDC GLUCOMTR-MCNC: 121 MG/DL — HIGH (ref 70–99)
GLUCOSE SERPL-MCNC: 143 MG/DL — HIGH (ref 70–99)
HCT VFR BLD CALC: 38 % — LOW (ref 39–50)
HGB BLD-MCNC: 12.5 G/DL — LOW (ref 13–17)
LYMPHOCYTES # BLD AUTO: 1.2 K/UL — SIGNIFICANT CHANGE UP (ref 1–3.3)
LYMPHOCYTES # BLD AUTO: 29.6 % — SIGNIFICANT CHANGE UP (ref 13–44)
MACROCYTES BLD QL: SLIGHT — SIGNIFICANT CHANGE UP
MAGNESIUM SERPL-MCNC: 2 MG/DL — SIGNIFICANT CHANGE UP (ref 1.6–2.6)
MANUAL SMEAR VERIFICATION: SIGNIFICANT CHANGE UP
MCHC RBC-ENTMCNC: 31.8 PG — SIGNIFICANT CHANGE UP (ref 27–34)
MCHC RBC-ENTMCNC: 32.9 GM/DL — SIGNIFICANT CHANGE UP (ref 32–36)
MCV RBC AUTO: 96.7 FL — SIGNIFICANT CHANGE UP (ref 80–100)
MONOCYTES # BLD AUTO: 0.35 K/UL — SIGNIFICANT CHANGE UP (ref 0–0.9)
MONOCYTES NFR BLD AUTO: 8.7 % — SIGNIFICANT CHANGE UP (ref 2–14)
NEUTROPHILS # BLD AUTO: 2.26 K/UL — SIGNIFICANT CHANGE UP (ref 1.8–7.4)
NEUTROPHILS NFR BLD AUTO: 55.6 % — SIGNIFICANT CHANGE UP (ref 43–77)
OVALOCYTES BLD QL SMEAR: SLIGHT — SIGNIFICANT CHANGE UP
PHOSPHATE SERPL-MCNC: 3.6 MG/DL — SIGNIFICANT CHANGE UP (ref 2.5–4.5)
PLAT MORPH BLD: ABNORMAL
PLATELET # BLD AUTO: 302 K/UL — SIGNIFICANT CHANGE UP (ref 150–400)
POIKILOCYTOSIS BLD QL AUTO: SLIGHT — SIGNIFICANT CHANGE UP
POLYCHROMASIA BLD QL SMEAR: SLIGHT — SIGNIFICANT CHANGE UP
POTASSIUM SERPL-MCNC: 3.6 MMOL/L — SIGNIFICANT CHANGE UP (ref 3.5–5.3)
POTASSIUM SERPL-SCNC: 3.6 MMOL/L — SIGNIFICANT CHANGE UP (ref 3.5–5.3)
PROT SERPL-MCNC: 6.9 G/DL — SIGNIFICANT CHANGE UP (ref 6–8.3)
RBC # BLD: 3.93 M/UL — LOW (ref 4.2–5.8)
RBC # FLD: 12.4 % — SIGNIFICANT CHANGE UP (ref 10.3–14.5)
RBC BLD AUTO: ABNORMAL
SODIUM SERPL-SCNC: 140 MMOL/L — SIGNIFICANT CHANGE UP (ref 135–145)
VARIANT LYMPHS # BLD: 0.9 % — SIGNIFICANT CHANGE UP (ref 0–6)
WBC # BLD: 4.06 K/UL — SIGNIFICANT CHANGE UP (ref 3.8–10.5)
WBC # FLD AUTO: 4.06 K/UL — SIGNIFICANT CHANGE UP (ref 3.8–10.5)

## 2023-09-12 PROCEDURE — 99239 HOSP IP/OBS DSCHRG MGMT >30: CPT | Mod: GC

## 2023-09-12 RX ORDER — INSULIN LISPRO 100/ML
VIAL (ML) SUBCUTANEOUS
Refills: 0 | Status: DISCONTINUED | OUTPATIENT
Start: 2023-09-12 | End: 2023-09-12

## 2023-09-12 RX ORDER — DEXTROSE 50 % IN WATER 50 %
12.5 SYRINGE (ML) INTRAVENOUS ONCE
Refills: 0 | Status: DISCONTINUED | OUTPATIENT
Start: 2023-09-12 | End: 2023-09-12

## 2023-09-12 RX ORDER — DEXTROSE 50 % IN WATER 50 %
25 SYRINGE (ML) INTRAVENOUS ONCE
Refills: 0 | Status: DISCONTINUED | OUTPATIENT
Start: 2023-09-12 | End: 2023-09-12

## 2023-09-12 RX ORDER — GLUCAGON INJECTION, SOLUTION 0.5 MG/.1ML
1 INJECTION, SOLUTION SUBCUTANEOUS ONCE
Refills: 0 | Status: DISCONTINUED | OUTPATIENT
Start: 2023-09-12 | End: 2023-09-12

## 2023-09-12 RX ORDER — ESCITALOPRAM OXALATE 10 MG/1
5 TABLET, FILM COATED ORAL DAILY
Refills: 0 | Status: DISCONTINUED | OUTPATIENT
Start: 2023-09-12 | End: 2023-09-12

## 2023-09-12 RX ORDER — AMPICILLIN SODIUM AND SULBACTAM SODIUM 250; 125 MG/ML; MG/ML
3 INJECTION, POWDER, FOR SUSPENSION INTRAMUSCULAR; INTRAVENOUS EVERY 6 HOURS
Refills: 0 | Status: DISCONTINUED | OUTPATIENT
Start: 2023-09-12 | End: 2023-09-12

## 2023-09-12 RX ORDER — CEPHALEXIN 500 MG
1 CAPSULE ORAL
Qty: 56 | Refills: 0
Start: 2023-09-12 | End: 2023-09-25

## 2023-09-12 RX ORDER — AMPICILLIN SODIUM AND SULBACTAM SODIUM 250; 125 MG/ML; MG/ML
INJECTION, POWDER, FOR SUSPENSION INTRAMUSCULAR; INTRAVENOUS
Refills: 0 | Status: DISCONTINUED | OUTPATIENT
Start: 2023-09-12 | End: 2023-09-12

## 2023-09-12 RX ORDER — ALBUTEROL 90 UG/1
2 AEROSOL, METERED ORAL EVERY 6 HOURS
Refills: 0 | Status: DISCONTINUED | OUTPATIENT
Start: 2023-09-12 | End: 2023-09-12

## 2023-09-12 RX ORDER — SODIUM CHLORIDE 9 MG/ML
1000 INJECTION, SOLUTION INTRAVENOUS
Refills: 0 | Status: DISCONTINUED | OUTPATIENT
Start: 2023-09-12 | End: 2023-09-12

## 2023-09-12 RX ORDER — ATORVASTATIN CALCIUM 80 MG/1
40 TABLET, FILM COATED ORAL AT BEDTIME
Refills: 0 | Status: DISCONTINUED | OUTPATIENT
Start: 2023-09-12 | End: 2023-09-12

## 2023-09-12 RX ORDER — INFLUENZA VIRUS VACCINE 15; 15; 15; 15 UG/.5ML; UG/.5ML; UG/.5ML; UG/.5ML
0.5 SUSPENSION INTRAMUSCULAR ONCE
Refills: 0 | Status: DISCONTINUED | OUTPATIENT
Start: 2023-09-12 | End: 2023-09-12

## 2023-09-12 RX ORDER — TRAZODONE HCL 50 MG
150 TABLET ORAL ONCE
Refills: 0 | Status: COMPLETED | OUTPATIENT
Start: 2023-09-12 | End: 2023-09-12

## 2023-09-12 RX ORDER — FAMOTIDINE 10 MG/ML
40 INJECTION INTRAVENOUS DAILY
Refills: 0 | Status: DISCONTINUED | OUTPATIENT
Start: 2023-09-12 | End: 2023-09-12

## 2023-09-12 RX ORDER — LISINOPRIL 2.5 MG/1
10 TABLET ORAL DAILY
Refills: 0 | Status: DISCONTINUED | OUTPATIENT
Start: 2023-09-11 | End: 2023-09-12

## 2023-09-12 RX ORDER — TRAZODONE HCL 50 MG
150 TABLET ORAL AT BEDTIME
Refills: 0 | Status: DISCONTINUED | OUTPATIENT
Start: 2023-09-12 | End: 2023-09-12

## 2023-09-12 RX ORDER — DEXTROSE 50 % IN WATER 50 %
15 SYRINGE (ML) INTRAVENOUS ONCE
Refills: 0 | Status: DISCONTINUED | OUTPATIENT
Start: 2023-09-12 | End: 2023-09-12

## 2023-09-12 RX ADMIN — Medication 150 MILLIGRAM(S): at 01:03

## 2023-09-12 RX ADMIN — FAMOTIDINE 40 MILLIGRAM(S): 10 INJECTION INTRAVENOUS at 12:04

## 2023-09-12 RX ADMIN — AMPICILLIN SODIUM AND SULBACTAM SODIUM 200 GRAM(S): 250; 125 INJECTION, POWDER, FOR SUSPENSION INTRAMUSCULAR; INTRAVENOUS at 12:03

## 2023-09-12 RX ADMIN — HEPARIN SODIUM 5000 UNIT(S): 5000 INJECTION INTRAVENOUS; SUBCUTANEOUS at 06:23

## 2023-09-12 RX ADMIN — Medication 4: at 00:06

## 2023-09-12 RX ADMIN — AMPICILLIN SODIUM AND SULBACTAM SODIUM 200 GRAM(S): 250; 125 INJECTION, POWDER, FOR SUSPENSION INTRAMUSCULAR; INTRAVENOUS at 06:23

## 2023-09-12 RX ADMIN — HEPARIN SODIUM 5000 UNIT(S): 5000 INJECTION INTRAVENOUS; SUBCUTANEOUS at 14:18

## 2023-09-12 RX ADMIN — AMPICILLIN SODIUM AND SULBACTAM SODIUM 200 GRAM(S): 250; 125 INJECTION, POWDER, FOR SUSPENSION INTRAMUSCULAR; INTRAVENOUS at 01:03

## 2023-09-12 RX ADMIN — HEPARIN SODIUM 5000 UNIT(S): 5000 INJECTION INTRAVENOUS; SUBCUTANEOUS at 00:03

## 2023-09-12 RX ADMIN — ESCITALOPRAM OXALATE 5 MILLIGRAM(S): 10 TABLET, FILM COATED ORAL at 12:04

## 2023-09-12 RX ADMIN — LISINOPRIL 10 MILLIGRAM(S): 2.5 TABLET ORAL at 06:23

## 2023-09-12 NOTE — DISCHARGE NOTE PROVIDER - NSDCMRMEDTOKEN_GEN_ALL_CORE_FT
albuterol 90 mcg/inh inhalation aerosol: 2 puff(s) inhaled every 6 hours, As needed, Shortness of Breath  docusate sodium 100 mg oral capsule: 1 cap(s) orally 2 times a day  escitalopram 5 mg oral tablet: 1 tab(s) orally once a day  famotidine 40 mg oral tablet: 1 tab(s) orally once a day (at bedtime)  fenofibrate 130 mg oral capsule: 1 cap(s) orally once a day  glimepiride 4 mg oral tablet: 1 tab(s) orally 2 times a day  Januvia 100 mg oral tablet: 1 tab(s) orally once a day  Jardiance 25 mg oral tablet: 1 tab(s) orally once a day  lisinopril 10 mg oral tablet: 1 tab(s) orally once a day  magnesium oxide 400 mg oral tablet: 1 tab(s) orally once a day  metFORMIN 1000 mg oral tablet: 1 tab(s) orally 2 times a day  rosuvastatin 10 mg oral tablet: 1 tab(s) orally once a day (at bedtime)  traZODone 150 mg oral tablet: 1 tab(s) orally once a day (at bedtime)   albuterol 90 mcg/inh inhalation aerosol: 2 puff(s) inhaled every 6 hours, As needed, Shortness of Breath  cephalexin 500 mg oral capsule: 1 cap(s) orally 4 times a day  docusate sodium 100 mg oral capsule: 1 cap(s) orally 2 times a day  escitalopram 5 mg oral tablet: 1 tab(s) orally once a day  famotidine 40 mg oral tablet: 1 tab(s) orally once a day (at bedtime)  fenofibrate 130 mg oral capsule: 1 cap(s) orally once a day  glimepiride 4 mg oral tablet: 1 tab(s) orally 2 times a day  Januvia 100 mg oral tablet: 1 tab(s) orally once a day  Jardiance 25 mg oral tablet: 1 tab(s) orally once a day  lisinopril 10 mg oral tablet: 1 tab(s) orally once a day  magnesium oxide 400 mg oral tablet: 1 tab(s) orally once a day  metFORMIN 1000 mg oral tablet: 1 tab(s) orally 2 times a day  rosuvastatin 10 mg oral tablet: 1 tab(s) orally once a day (at bedtime)  sulfamethoxazole-trimethoprim 800 mg-160 mg oral tablet: 2 tab(s) orally 2 times a day  traZODone 150 mg oral tablet: 1 tab(s) orally once a day (at bedtime)   albuterol 90 mcg/inh inhalation aerosol: 2 puff(s) inhaled every 6 hours, As needed, Shortness of Breath  cephalexin 500 mg oral capsule: 1 cap(s) orally 4 times a day  docusate sodium 100 mg oral capsule: 1 cap(s) orally 2 times a day  escitalopram 5 mg oral tablet: 1 tab(s) orally once a day  famotidine 40 mg oral tablet: 1 tab(s) orally once a day (at bedtime)  fenofibrate 130 mg oral capsule: 1 cap(s) orally once a day  glimepiride 4 mg oral tablet: 1 tab(s) orally 2 times a day  Januvia 100 mg oral tablet: 1 tab(s) orally once a day  Jardiance 25 mg oral tablet: 1 tab(s) orally once a day  lisinopril 10 mg oral tablet: 1 tab(s) orally once a day  magnesium oxide 400 mg oral tablet: 1 tab(s) orally once a day  metFORMIN 1000 mg oral tablet: 1 tab(s) orally 2 times a day  rosuvastatin 10 mg oral tablet: 1 tab(s) orally once a day (at bedtime)  sulfamethoxazole-trimethoprim 800 mg-160 mg oral tablet: 1 tab(s) orally 2 times a day  traZODone 150 mg oral tablet: 1 tab(s) orally once a day (at bedtime)

## 2023-09-12 NOTE — DISCHARGE NOTE PROVIDER - NSDCCPCAREPLAN_GEN_ALL_CORE_FT
PRINCIPAL DISCHARGE DIAGNOSIS  Diagnosis: Cellulitis  Assessment and Plan of Treatment: La celulitis es ruthy infección de las capas más profundas de la piel y del tejido subyacente. Puede ser grave si no se trata a tiempo. La infección se desarrolla repentinamente y puede propagarse rápidamente por el cuerpo. Las infecciones graves pueden extenderse profundamente al cuerpo y poner en peligro la samantha. La mayoría de los casos se tratan con éxito con antibióticos en casa, aunque a veces es necesario tratarlo en el hospital.  INSTRUCCIONES:  - Por favor empeice a bambi los antibioticos Bactrim 800/160mg ruthy tableta dos veces al oscar y Keflex 500mg ruthy tableta dos veces al oscar por 14 dutta   - Por favor, kathy seguimiento con el podiatra el oscar 18 de septiembre para clarisa valeriano esta sanando el dedo que actualmente tiene la infeccion  - Por favor, kathy seguimiento con dias medico primario para hacerle laboratiorios para     PRINCIPAL DISCHARGE DIAGNOSIS  Diagnosis: Cellulitis  Assessment and Plan of Treatment: La celulitis es ruthy infección de las capas más profundas de la piel y del tejido subyacente. Puede ser grave si no se trata a tiempo. La infección se desarrolla repentinamente y puede propagarse rápidamente por el cuerpo. Las infecciones graves pueden extenderse profundamente al cuerpo y poner en peligro la samantha. La mayoría de los casos se tratan con éxito con antibióticos en casa, aunque a veces es necesario tratarlo en el hospital.  INSTRUCCIONES:  - Por favor empeice a bambi los antibioticos Bactrim 800/160mg ruthy tableta dos veces al oscar y Keflex 500mg ruthy tableta dos veces al oscar por 14 dutta   - Por favor, kathy seguimiento con el podiatra el oscar 18 de septiembre para clarisa valeriano esta sanando el dedo que actualmente tiene la infeccion  - Por favor, kathy seguimiento con dias medico primario para hacerle laboratiorios para monitorear los efectos secundarios de los antibioticos que esta tomando en estos momentos

## 2023-09-12 NOTE — DISCHARGE NOTE PROVIDER - NSDCFUADDAPPT_GEN_ALL_CORE_FT
Please follow up with Dr River Guerra at the Podiatric Medicine office at  Please follow up with Dr River Guerra at the Podiatric Medicine office at   Your Primary Care physician's office will call you to schedule an appointment within 1-2 weeks from hospital discharge

## 2023-09-12 NOTE — PROGRESS NOTE ADULT - PROBLEM SELECTOR PLAN 1
Nonpurulent cellulitis 2/2 dog bite, ?tx failure outpt s/p 6d course of augmentin pt reports adherence. Physical exam on day of admission relatively benign, pt does not meet SIRS criteria    - Podiatry consulted, appreciate recs  - Unasyn 3g Q6 IV; better penetration noted in IV formulation

## 2023-09-12 NOTE — PATIENT PROFILE ADULT - FALL HARM RISK - UNIVERSAL INTERVENTIONS
Bed in lowest position, wheels locked, appropriate side rails in place/Call bell, personal items and telephone in reach/Instruct patient to call for assistance before getting out of bed or chair/Non-slip footwear when patient is out of bed/[place] to call system/Physically safe environment - no spills, clutter or unnecessary equipment/Purposeful Proactive Rounding/Room/bathroom lighting operational, light cord in reach

## 2023-09-12 NOTE — DISCHARGE NOTE NURSING/CASE MANAGEMENT/SOCIAL WORK - NSDCFUADDAPPT_GEN_ALL_CORE_FT
Please follow up with Dr River Guerra at the Podiatric Medicine office at   Your Primary Care physician's office will call you to schedule an appointment within 1-2 weeks from hospital discharge

## 2023-09-12 NOTE — DISCHARGE NOTE NURSING/CASE MANAGEMENT/SOCIAL WORK - PATIENT PORTAL LINK FT
You can access the FollowMyHealth Patient Portal offered by [facility] by registering at the following website: [email or web address]. By joining [facility]’s FollowMyHealth portal, you will also be able to view your health information using other applications (apps) compatible with our system.

## 2023-09-12 NOTE — DISCHARGE NOTE NURSING/CASE MANAGEMENT/SOCIAL WORK - NSDCVIVACCINE_GEN_ALL_CORE_FT
Tdap; 06-Sep-2023 12:06; Bren Yin (RN); [employer]; [id number] (Exp. Date: 18-Aug-2025); IntraMuscular; Deltoid Right.; 0.5 milliLiter(s); VIS (VIS Published: 09-May-2013, VIS Presented: 06-Sep-2023);

## 2023-09-12 NOTE — PROGRESS NOTE ADULT - SUBJECTIVE AND OBJECTIVE BOX
Patient is a 63y old  Male who presents with a chief complaint of Cellulitis after dog bite (12 Sep 2023 07:24)      INTERVAL HPI/ OVERNIGHT EVENTS. Pt seen and examined bedside. States pain to his L foot is improving.       LABS                        12.5   4.06  )-----------( 302      ( 12 Sep 2023 08:18 )             38.0     09-11    139  |  103  |  17  ----------------------------<  230<H>  4.2   |  26  |  0.93    Ca    10.5      11 Sep 2023 17:43    TPro  7.9  /  Alb  4.5  /  TBili  0.2  /  DBili  x   /  AST  18  /  ALT  17  /  AlkPhos  77  09-11      ESR: 35  CRP: --  09-11 @ 17:43    ICU Vital Signs Last 24 Hrs  T(C): 36.9 (12 Sep 2023 06:07), Max: 36.9 (12 Sep 2023 06:07)  T(F): 98.4 (12 Sep 2023 06:07), Max: 98.4 (12 Sep 2023 06:07)  HR: 73 (12 Sep 2023 08:44) (68 - 97)  BP: 101/63 (12 Sep 2023 08:44) (101/63 - 136/80)  BP(mean): --  ABP: --  ABP(mean): --  RR: 18 (12 Sep 2023 00:38) (18 - 20)  SpO2: 97% (12 Sep 2023 06:07) (96% - 98%)    O2 Parameters below as of 12 Sep 2023 06:07  Patient On (Oxygen Delivery Method): room air            RADIOLOGY  < from: Xray Foot AP + Lateral, Left (09.11.23 @ 17:04) >  IMPRESSION: No acute fracture or dislocation. No radiographic evidence of   osteomyelitis or tracking soft tissue gas.    < end of copied text >      PHYSICAL EXAM  Lower Extremity Focused  Vasc: 2/4 PT/DP b/l , resolving cellulitis - no erythema or edema noted at the L hallux, - drainage, -  malodor,  Derm: L hallux closed wound consistent with an animal bite seen on the dorsal aspect of L hallux, surgical scar noted on the dorsal aspect of L MPJ  Neuro: Protective sensation is intact   MSK: Pain on palpation on the plantar aspect of the hallux, Mild HAV noted on the R hallux

## 2023-09-12 NOTE — DISCHARGE NOTE PROVIDER - HOSPITAL COURSE
#Discharge: do not delete    Patient is __ yo M/F with past medical history of _____, presented with _____, found to have _____      Problem List/Main Diagnoses:     New medications/therapies:   New lines/hardware:  Labs to be followed outpatient:   Exam to be followed outpatient:     Discharge plan: discharge to ______    Physical Exam Upon Discharge:  T(C): 36.9 (09-12-23 @ 06:07), Max: 36.9 (09-12-23 @ 06:07)  HR: 68 (09-12-23 @ 06:07) (68 - 97)  BP: 109/68 (09-12-23 @ 06:07) (109/68 - 136/80)  RR: 18 (09-12-23 @ 00:38) (18 - 20)  SpO2: 97% (09-12-23 @ 06:07) (96% - 98%)    General: NAD, laying in bed, speaking in full sentences  HEENT: head NC/AT, no conjunctival injection, EOMI, MMM  Neck: supple, no JVD  Cardio: RRR, +S1/S2, no M/R/G  Resp: lungs CTAB, no cough/wheezes/rales/rhonchi  Abdo: soft, NT, ND, +bowel sounds x4, no organomegaly or palpable mass    Extremities: WWP, no edema/cyanosis/clubbing   Vasc: 2+ radial and DP pulses b/l  Neuro: A&Ox3  Psych: speech non-pressured, thoughts goal-oriented  Skin: dry, intact, no visible jaundice   MSK: no joint swelling     63 M PMHx of DM, asthma, HLD, HTN, and anxiety p/w nonpurulent cellulitis 2/2 dog bite 11d prior to admission. The pt presented for the first time to the ED on Sept 6th and was prescribed Augmentin 875/125 for 10 days. The pt returned on Sept 8th with complain of the infection not improving and he was sent homer with advise to continue taking the prior prescribed antibiotic. The pt then returned again on 9/11, after taking his antibiotic for 6 days, stating that his toe was more painful and swollen. In the ED, the pt was afebrile, without leukocytosis. On physical exam, the left hallux was swollen, erythematous, tender to palpation, without discharge and with clear bite marks in the dorsal and plantar aspect of the 1st toe. The pt was given Vancomycin 1250mg and Zosyn 3.375mg once in the ED and switched to Unasyn 3g IV while in the hospital. Podiatry evaluated the patient and advised that the pt be admitted for IV antibiotics to the medical floors. The pt was evaluated on the day of discharge. He was hemodynamically stable, afebrile, without leukocytosis, with improved symptoms.       Problem List/Main Diagnoses:     #Non-purulent Cellulitis of the left Hallux  Secondary to the dog bite. Received Augmentin 875/125 as outpatient for 6 days without improvement. Received Vancomycin 1250mg and Zosyn 3.375mg once in the ED. Switched to IV Unasyn once admitted. Podiatry followed the patient   Plan:  - Please start taking Keflex 500mg every 6 hours for 14 days  - Please start taking Bactrim 800mg/160mg 1 tablets every 12 hours for 14 days  - Please follow up with Dr River Guerra on September 18th at 3pm  - Please follow up with your primary care provider for monitoring side effects of new medications prescribed     #T2DM (type 2 diabetes mellitus).   The pt has a Hx of diabetes and is managed outpatient with Glimepiride 4mg Q12, Metformin 1g Q12, Januvia 100mg Qd, Jardiance 25 Qd. The pt presented with glucose of 230 which improved to 166 on discharge. HgA1c was 7.4 during this admission.   Plan:  - Please resume your Diabetes medications once discharged from the hospital as prior to arrival   - Please follow up with your Primary Care Provider Dr Ramnirine for further management     New medications/therapies: Keflex 500mg every 6 hours for 14 days, Bactrim 800mg/160mg 1 tablets every 12 hours for 14 days.  New lines/hardware: None  Labs to be followed outpatient: Please collect a follow up BMP as the pt is on bactrim and can cause hyperkalemia   Exam to be followed outpatient: Please re-examine the left Hallux for improved swelling, tenderness and ROM     Discharge plan: discharge to Home    Physical Exam Upon Discharge:  T(C): 36.9 (09-12-23 @ 06:07), Max: 36.9 (09-12-23 @ 06:07)  HR: 68 (09-12-23 @ 06:07) (68 - 97)  BP: 109/68 (09-12-23 @ 06:07) (109/68 - 136/80)  RR: 18 (09-12-23 @ 00:38) (18 - 20)  SpO2: 97% (09-12-23 @ 06:07) (96% - 98%)    General: NAD, laying in bed, speaking in full sentences  HEENT: head NC/AT, no conjunctival injection, EOMI, MMM  Neck: supple, no JVD  Cardio: RRR, +S1/S2, no M/R/G  Resp: lungs CTAB, no cough/wheezes/rales/rhonchi  Abdo: soft, NT, ND, +bowel sounds x4, no organomegaly or palpable mass    Extremities: WWP, no edema/cyanosis/clubbing   Vasc: 2+ radial and DP pulses b/l  Neuro: A&Ox3  Psych: speech non-pressured, thoughts goal-oriented  Skin: dry, intact, no visible jaundice   MSK: no joint swelling     63 M PMHx of DM, asthma, HLD, HTN, and anxiety p/w nonpurulent cellulitis 2/2 dog bite 11d prior to admission. The pt presented for the first time to the ED on Sept 6th and was prescribed Augmentin 875/125 for 10 days. The pt returned on Sept 8th with complain of the infection not improving and he was sent homer with advise to continue taking the prior prescribed antibiotic. The pt then returned again on 9/11, after taking his antibiotic for 6 days, stating that his toe was more painful and swollen. In the ED, the pt was afebrile, without leukocytosis. On physical exam, the left hallux was swollen, erythematous, tender to palpation, without discharge and with clear bite marks in the dorsal and plantar aspect of the 1st toe. The pt was given Vancomycin 1250mg and Zosyn 3.375mg once in the ED and switched to Unasyn 3g IV while in the hospital. Podiatry evaluated the patient and advised that the pt be admitted for IV antibiotics to the medical floors. The pt was evaluated on the day of discharge. He was hemodynamically stable, afebrile, without leukocytosis, with improved symptoms.       Problem List/Main Diagnoses:     #Non-purulent Cellulitis of the left Hallux  Secondary to the dog bite. Received Augmentin 875/125 as outpatient for 6 days without improvement. Received Vancomycin 1250mg and Zosyn 3.375mg once in the ED. Switched to IV Unasyn once admitted. Podiatry followed the patient   Plan:  - Please start taking Keflex 500mg every 6 hours for 14 days  - Please start taking Bactrim 800mg/160mg 1 tablets every 12 hours for 14 days  - Please follow up with Dr River Guerra on September 18th at 3pm  - Please follow up with your primary care provider for monitoring side effects of new medications prescribed     #T2DM (type 2 diabetes mellitus).   The pt has a Hx of diabetes and is managed outpatient with Glimepiride 4mg Q12, Metformin 1g Q12, Januvia 100mg Qd, Jardiance 25 Qd. The pt presented with glucose of 230 which improved to 166 on discharge. HgA1c was 7.4 during this admission.   Plan:  - Please resume your Diabetes medications once discharged from the hospital as prior to arrival   - Please follow up with your Primary Care Provider Dr Ramnirine for further management     New medications/therapies: Keflex 500mg every 6 hours for 14 days, Bactrim 800mg/160mg 1 tablets every 12 hours for 14 days.  New lines/hardware: None  Labs to be followed outpatient: Please collect a follow up BMP as the pt is on bactrim and can cause hyperkalemia   Exam to be followed outpatient: Please re-examine the left Hallux for improved swelling, tenderness and ROM     Discharge plan: discharge to Home    Physical Exam Upon Discharge:  T(C): 36.9 (09-12-23 @ 06:07), Max: 36.9 (09-12-23 @ 06:07)  HR: 68 (09-12-23 @ 06:07) (68 - 97)  BP: 109/68 (09-12-23 @ 06:07) (109/68 - 136/80)  RR: 18 (09-12-23 @ 00:38) (18 - 20)  SpO2: 97% (09-12-23 @ 06:07) (96% - 98%)    General: NAD, laying in bed, speaking in full sentences  HEENT: head NC/AT, no conjunctival injection, EOMI, MMM  Neck: supple, no JVD  Cardio: RRR, +S1/S2, no M/R/G  Resp: lungs CTAB, no cough/wheezes/rales/rhonchi  Abdo: soft, NT, ND, +bowel sounds x4, no organomegaly or palpable mass    Extremities: WWP, no edema/cyanosis/clubbing   Vasc: 2+ radial and DP pulses b/l  Neuro: A&Ox3  Psych: speech non-pressured, thoughts goal-oriented  Skin: Left Hallux is swollen, erythematous and tender to palpation. No discharge noted in the area. 3 bite marks are visible, 2 in the dorsal aspect and 1 in the plantar aspect  MSK: no joint swelling

## 2023-09-12 NOTE — DISCHARGE NOTE PROVIDER - CARE PROVIDER_API CALL
iRver Guerra  Podiatric Medicine  [address], [address]  [place], [place] [address]  Phone: [phone]  Fax: [phone]  Scheduled Appointment: 09/18/2023 03:00 PM

## 2023-09-12 NOTE — DISCHARGE NOTE PROVIDER - ATTENDING DISCHARGE PHYSICAL EXAMINATION:
GENERAL: NAD, lying in bed comfortably  HEAD:  Atraumatic, Normocephalic  EYES: EOMI, PERRL  ENT: Moist mucous membranes  NECK: Supple, No JVD  CHEST/LUNG: Clear to auscultation bilaterally  HEART: RRR  ABDOMEN: BSx4; Soft, NT, BD  EXTREMITIES: Left LE 1st digit erythema, warmth and swelling. No cyanosis. 2+ Peripheral Pulses  NERVOUS SYSTEM:  A&Ox3, no focal deficits

## 2023-09-12 NOTE — PROGRESS NOTE ADULT - SUBJECTIVE AND OBJECTIVE BOX
** INCOMPLETE **    OVERNIGHT EVENTS:     SUBJECTIVE:    VITAL SIGNS:  Vital Signs Last 24 Hrs  T(C): 36.9 (12 Sep 2023 06:07), Max: 36.9 (12 Sep 2023 06:07)  T(F): 98.4 (12 Sep 2023 06:07), Max: 98.4 (12 Sep 2023 06:07)  HR: 68 (12 Sep 2023 06:07) (68 - 97)  BP: 109/68 (12 Sep 2023 06:07) (109/68 - 136/80)  BP(mean): --  RR: 18 (12 Sep 2023 00:38) (18 - 20)  SpO2: 97% (12 Sep 2023 06:07) (96% - 98%)    Parameters below as of 12 Sep 2023 06:07  Patient On (Oxygen Delivery Method): room air        PHYSICAL EXAM:  General: NAD; speaking in full sentences  HEENT: NC/AT; PERRL; EOMI; MMM  Neck: supple; no JVD  Cardiac: RRR; +S1/S2  Pulm: CTA B/L; no W/R/R  GI: soft, NT/ND, +BS  Extremities: WWP; no edema, clubbing or cyanosis  Vasc: 2+ radial, DP pulses B/L  Neuro: AAOx3; no focal deficits    MEDICATIONS:  MEDICATIONS  (STANDING):  ampicillin/sulbactam  IVPB 3 Gram(s) IV Intermittent every 6 hours  atorvastatin 40 milliGRAM(s) Oral at bedtime  dextrose 5%. 1000 milliLiter(s) (100 mL/Hr) IV Continuous <Continuous>  dextrose 5%. 1000 milliLiter(s) (50 mL/Hr) IV Continuous <Continuous>  dextrose 50% Injectable 25 Gram(s) IV Push once  dextrose 50% Injectable 12.5 Gram(s) IV Push once  dextrose 50% Injectable 25 Gram(s) IV Push once  escitalopram 5 milliGRAM(s) Oral daily  famotidine    Tablet 40 milliGRAM(s) Oral daily  glucagon  Injectable 1 milliGRAM(s) IntraMuscular once  heparin   Injectable 5000 Unit(s) SubCutaneous every 8 hours  influenza   Vaccine 0.5 milliLiter(s) IntraMuscular once  insulin lispro (ADMELOG) corrective regimen sliding scale   SubCutaneous Before meals and at bedtime  lisinopril 10 milliGRAM(s) Oral daily  traZODone 150 milliGRAM(s) Oral at bedtime    MEDICATIONS  (PRN):  acetaminophen     Tablet .. 650 milliGRAM(s) Oral every 6 hours PRN Temp greater or equal to 38C (100.4F), Mild Pain (1 - 3)  albuterol    90 MICROgram(s) HFA Inhaler 2 Puff(s) Inhalation every 6 hours PRN Shortness of Breath  aluminum hydroxide/magnesium hydroxide/simethicone Suspension 30 milliLiter(s) Oral every 4 hours PRN Dyspepsia  dextrose Oral Gel 15 Gram(s) Oral once PRN Blood Glucose LESS THAN 70 milliGRAM(s)/deciliter  melatonin 3 milliGRAM(s) Oral at bedtime PRN Insomnia  ondansetron Injectable 4 milliGRAM(s) IV Push every 8 hours PRN Nausea and/or Vomiting      ALLERGIES:  Allergies    No Known Allergies    Intolerances        LABS:                        13.8   5.58  )-----------( 313      ( 11 Sep 2023 17:43 )             39.7     09-11    139  |  103  |  17  ----------------------------<  230<H>  4.2   |  26  |  0.93    Ca    10.5      11 Sep 2023 17:43    TPro  7.9  /  Alb  4.5  /  TBili  0.2  /  DBili  x   /  AST  18  /  ALT  17  /  AlkPhos  77  09-11        RADIOLOGY & ADDITIONAL TESTS: Reviewed. ** INCOMPLETE **    OVERNIGHT EVENTS: BUBBA     SUBJECTIVE: Pt examined at bedside, alert and oriented with no new complaints. Pt states pain is still constant and throbbing. States he is able to walk and used bathroom in the AM with slight adjustment of weight on L foot. Pt inquired about cefuroxime but   Denies any erythema, radiation of pain, purulent drainage, malodor, chest pain/palpitations, dyspnea.   Vital Signs Last 24 Hrs  T(C): 36.9 (12 Sep 2023 06:07), Max: 36.9 (12 Sep 2023 06:07)  T(F): 98.4 (12 Sep 2023 06:07), Max: 98.4 (12 Sep 2023 06:07)  HR: 73 (12 Sep 2023 08:44) (68 - 97)  BP: 101/63 (12 Sep 2023 08:44) (101/63 - 136/80)  BP(mean): --  RR: 18 (12 Sep 2023 00:38) (18 - 20)  SpO2: 97% (12 Sep 2023 06:07) (96% - 98%)    Parameters below as of 12 Sep 2023 06:07  Patient On (Oxygen Delivery Method): room air    PHYSICAL EXAM:  General: NAD; speaking in full sentences  HEENT: NC/AT; PERRL; EOMI; MMM  Neck: supple; no JVD  Cardiac: RRR; +S1/S2  Pulm: CTA B/L; no W/R/R  GI: soft, NT/ND, +BS  Extremities: WWP; no edema, clubbing or cyanosis  Vasc: 2+ radial, DP pulses B/L  Neuro: AAOx3; no focal deficits    MEDICATIONS:    MEDICATIONS  (STANDING):  ampicillin/sulbactam  IVPB 3 Gram(s) IV Intermittent every 6 hours  atorvastatin 40 milliGRAM(s) Oral at bedtime  dextrose 5%. 1000 milliLiter(s) (100 mL/Hr) IV Continuous <Continuous>  dextrose 5%. 1000 milliLiter(s) (50 mL/Hr) IV Continuous <Continuous>  dextrose 50% Injectable 25 Gram(s) IV Push once  dextrose 50% Injectable 25 Gram(s) IV Push once  dextrose 50% Injectable 12.5 Gram(s) IV Push once  escitalopram 5 milliGRAM(s) Oral daily  famotidine    Tablet 40 milliGRAM(s) Oral daily  glucagon  Injectable 1 milliGRAM(s) IntraMuscular once  heparin   Injectable 5000 Unit(s) SubCutaneous every 8 hours  influenza   Vaccine 0.5 milliLiter(s) IntraMuscular once  insulin lispro (ADMELOG) corrective regimen sliding scale   SubCutaneous Before meals and at bedtime  traZODone 150 milliGRAM(s) Oral at bedtime    MEDICATIONS  (PRN):  acetaminophen     Tablet .. 650 milliGRAM(s) Oral every 6 hours PRN Temp greater or equal to 38C (100.4F), Mild Pain (1 - 3)  albuterol    90 MICROgram(s) HFA Inhaler 2 Puff(s) Inhalation every 6 hours PRN Shortness of Breath  aluminum hydroxide/magnesium hydroxide/simethicone Suspension 30 milliLiter(s) Oral every 4 hours PRN Dyspepsia  dextrose Oral Gel 15 Gram(s) Oral once PRN Blood Glucose LESS THAN 70 milliGRAM(s)/deciliter  melatonin 3 milliGRAM(s) Oral at bedtime PRN Insomnia  ondansetron Injectable 4 milliGRAM(s) IV Push every 8 hours PRN Nausea and/or Vomiting    ALLERGIES: No Known Allergies/Intolerances     LABS:                     12.5   4.06  )-----------( 302      ( 12 Sep 2023 08:18 )             38.0     09-12    140  |  104  |  18  ----------------------------<  143<H>  3.6   |  22  |  0.77    Ca    9.3      12 Sep 2023 08:18  Phos  3.6     09-12  Mg     2.0     09-12    TPro  6.9  /  Alb  4.0  /  TBili  0.4  /  DBili  x   /  AST  14  /  ALT  12  /  AlkPhos  57  09-12      RADIOLOGY & ADDITIONAL TESTS: Reviewed.

## 2023-09-12 NOTE — DISCHARGE NOTE PROVIDER - NSFOLLOWUPCLINICS_GEN_ALL_ED_FT
A Family Medicine Doctor  Family Medicine  .  [place]   Phone:   Fax:   Established Patient  Follow Up Time: 1 week

## 2023-09-12 NOTE — PROGRESS NOTE ADULT - ASSESSMENT
63y old  Male with PMHx of DM, asthma, HLD, HTN, and anxiety who comes in for a dog bite on his L hallux which occurred about 10 days ago. He has been seen by [facility] ED about 3 times and notes that his symptoms have not improved. His last tetanus shot was during last ED visit on 9/6. At the time of consult VSS, WBC 5.58 CRP 43.8 ESR is pending. XRs were unchanged from previous. Because the pt is has failed PO antibiotics and is a high risk individual due to his DM, podiatry recommends admission and close monitoring. On 9/12 cellulitis is resolving with IV antibiotics. Recommend patient be discharged on oral antibiotics    Plan:   - C/w IV antibiotics while inpatient  - Recommend discharging pt on oral antibiotics (Bactrim & Keflex) for 14 days  - Patient should follow up with Dr. River Guerra within 1 week of discharge.    Office information:          [place] Address- [address], [place] [address] Phone: [phone]             Plan d/w attending, Podiatry following. 
63M PMHx of DM, asthma, HLD, HTN, and anxiety p/w nonpurulent cellulitis 2/2 dog bite 11d prior to admission. Iso ?outpt treatment failure on augmentin, a/f IV abx and serial podiatry eval.

## 2023-09-16 DIAGNOSIS — N52.9 MALE ERECTILE DYSFUNCTION, UNSPECIFIED: ICD-10-CM

## 2023-09-16 DIAGNOSIS — N40.0 BENIGN PROSTATIC HYPERPLASIA WITHOUT LOWER URINARY TRACT SYMPTOMS: ICD-10-CM

## 2023-09-16 DIAGNOSIS — L03.032 CELLULITIS OF LEFT TOE: ICD-10-CM

## 2023-09-16 DIAGNOSIS — F41.9 ANXIETY DISORDER, UNSPECIFIED: ICD-10-CM

## 2023-09-16 DIAGNOSIS — E78.5 HYPERLIPIDEMIA, UNSPECIFIED: ICD-10-CM

## 2023-09-16 DIAGNOSIS — J45.909 UNSPECIFIED ASTHMA, UNCOMPLICATED: ICD-10-CM

## 2023-09-16 DIAGNOSIS — S91.132S: ICD-10-CM
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
## 2023-09-16 DIAGNOSIS — I10 ESSENTIAL (PRIMARY) HYPERTENSION: ICD-10-CM

## 2023-09-16 DIAGNOSIS — F32.A DEPRESSION, UNSPECIFIED: ICD-10-CM

## 2023-09-16 DIAGNOSIS — Z79.84 LONG TERM (CURRENT) USE OF ORAL HYPOGLYCEMIC DRUGS: ICD-10-CM

## 2023-09-16 DIAGNOSIS — K21.9 GASTRO-ESOPHAGEAL REFLUX DISEASE WITHOUT ESOPHAGITIS: ICD-10-CM

## 2023-09-16 DIAGNOSIS — E11.9 TYPE 2 DIABETES MELLITUS WITHOUT COMPLICATIONS: ICD-10-CM

## 2023-09-16 LAB
CULTURE RESULTS: SIGNIFICANT CHANGE UP
CULTURE RESULTS: SIGNIFICANT CHANGE UP
SPECIMEN SOURCE: SIGNIFICANT CHANGE UP
SPECIMEN SOURCE: SIGNIFICANT CHANGE UP

## 2023-10-04 ENCOUNTER — EMERGENCY (EMERGENCY)
Facility: HOSPITAL | Age: 63
LOS: 1 days | Discharge: HOME | End: 2023-10-04
Attending: EMERGENCY MEDICINE | Admitting: EMERGENCY MEDICINE
Payer: MEDICARE

## 2023-10-04 VITALS
RESPIRATION RATE: 18 BRPM | WEIGHT: 156.97 LBS | OXYGEN SATURATION: 96 % | SYSTOLIC BLOOD PRESSURE: 126 MMHG | HEART RATE: 100 BPM | HEIGHT: 64 IN | TEMPERATURE: 99 F | DIASTOLIC BLOOD PRESSURE: 74 MMHG

## 2023-10-04 VITALS
SYSTOLIC BLOOD PRESSURE: 114 MMHG | HEART RATE: 72 BPM | RESPIRATION RATE: 18 BRPM | OXYGEN SATURATION: 97 % | TEMPERATURE: 99 F | DIASTOLIC BLOOD PRESSURE: 72 MMHG

## 2023-10-04 DIAGNOSIS — Z98.890 OTHER SPECIFIED POSTPROCEDURAL STATES: Chronic | ICD-10-CM

## 2023-10-04 DIAGNOSIS — Z90.49 ACQUIRED ABSENCE OF OTHER SPECIFIED PARTS OF DIGESTIVE TRACT: ICD-10-CM

## 2023-10-04 DIAGNOSIS — L53.9 ERYTHEMATOUS CONDITION, UNSPECIFIED: ICD-10-CM

## 2023-10-04 DIAGNOSIS — E78.5 HYPERLIPIDEMIA, UNSPECIFIED: ICD-10-CM

## 2023-10-04 DIAGNOSIS — M79.89 OTHER SPECIFIED SOFT TISSUE DISORDERS: ICD-10-CM

## 2023-10-04 DIAGNOSIS — M77.32 CALCANEAL SPUR, LEFT FOOT: ICD-10-CM

## 2023-10-04 DIAGNOSIS — E78.1 PURE HYPERGLYCERIDEMIA: ICD-10-CM

## 2023-10-04 DIAGNOSIS — I10 ESSENTIAL (PRIMARY) HYPERTENSION: ICD-10-CM

## 2023-10-04 DIAGNOSIS — Z87.438 PERSONAL HISTORY OF OTHER DISEASES OF MALE GENITAL ORGANS: ICD-10-CM

## 2023-10-04 DIAGNOSIS — J45.909 UNSPECIFIED ASTHMA, UNCOMPLICATED: ICD-10-CM

## 2023-10-04 DIAGNOSIS — Z90.49 ACQUIRED ABSENCE OF OTHER SPECIFIED PARTS OF DIGESTIVE TRACT: Chronic | ICD-10-CM

## 2023-10-04 DIAGNOSIS — Z79.84 LONG TERM (CURRENT) USE OF ORAL HYPOGLYCEMIC DRUGS: ICD-10-CM

## 2023-10-04 DIAGNOSIS — Q70.22 FUSED TOES, LEFT FOOT: ICD-10-CM

## 2023-10-04 DIAGNOSIS — F32.A DEPRESSION, UNSPECIFIED: ICD-10-CM

## 2023-10-04 DIAGNOSIS — Z87.19 PERSONAL HISTORY OF OTHER DISEASES OF THE DIGESTIVE SYSTEM: ICD-10-CM

## 2023-10-04 DIAGNOSIS — E11.9 TYPE 2 DIABETES MELLITUS WITHOUT COMPLICATIONS: ICD-10-CM

## 2023-10-04 DIAGNOSIS — M79.675 PAIN IN LEFT TOE(S): ICD-10-CM

## 2023-10-04 DIAGNOSIS — Z87.39 PERSONAL HISTORY OF OTHER DISEASES OF THE MUSCULOSKELETAL SYSTEM AND CONNECTIVE TISSUE: ICD-10-CM

## 2023-10-04 DIAGNOSIS — F41.9 ANXIETY DISORDER, UNSPECIFIED: ICD-10-CM

## 2023-10-04 DIAGNOSIS — Z41.9 ENCOUNTER FOR PROCEDURE FOR PURPOSES OTHER THAN REMEDYING HEALTH STATE, UNSPECIFIED: Chronic | ICD-10-CM

## 2023-10-04 DIAGNOSIS — Z86.69 PERSONAL HISTORY OF OTHER DISEASES OF THE NERVOUS SYSTEM AND SENSE ORGANS: ICD-10-CM

## 2023-10-04 LAB
ALBUMIN SERPL ELPH-MCNC: 4 G/DL — SIGNIFICANT CHANGE UP (ref 3.3–5)
ALP SERPL-CCNC: 70 U/L — SIGNIFICANT CHANGE UP (ref 40–120)
ALT FLD-CCNC: 14 U/L — SIGNIFICANT CHANGE UP (ref 10–45)
ANION GAP SERPL CALC-SCNC: 12 MMOL/L — SIGNIFICANT CHANGE UP (ref 5–17)
AST SERPL-CCNC: 15 U/L — SIGNIFICANT CHANGE UP (ref 10–40)
BASOPHILS # BLD AUTO: 0.02 K/UL — SIGNIFICANT CHANGE UP (ref 0–0.2)
BASOPHILS NFR BLD AUTO: 0.3 % — SIGNIFICANT CHANGE UP (ref 0–2)
BILIRUB SERPL-MCNC: 0.2 MG/DL — SIGNIFICANT CHANGE UP (ref 0.2–1.2)
BUN SERPL-MCNC: 20 MG/DL — SIGNIFICANT CHANGE UP (ref 7–23)
CALCIUM SERPL-MCNC: 9.1 MG/DL — SIGNIFICANT CHANGE UP (ref 8.4–10.5)
CHLORIDE SERPL-SCNC: 105 MMOL/L — SIGNIFICANT CHANGE UP (ref 96–108)
CO2 SERPL-SCNC: 21 MMOL/L — LOW (ref 22–31)
CREAT SERPL-MCNC: 1 MG/DL — SIGNIFICANT CHANGE UP (ref 0.5–1.3)
CRP SERPL-MCNC: <3 MG/L — SIGNIFICANT CHANGE UP (ref 0–4)
EGFR: 85 ML/MIN/1.73M2 — SIGNIFICANT CHANGE UP
EOSINOPHIL # BLD AUTO: 0.06 K/UL — SIGNIFICANT CHANGE UP (ref 0–0.5)
EOSINOPHIL NFR BLD AUTO: 0.9 % — SIGNIFICANT CHANGE UP (ref 0–6)
ERYTHROCYTE [SEDIMENTATION RATE] IN BLOOD: 10 MM/HR — SIGNIFICANT CHANGE UP
GLUCOSE SERPL-MCNC: 227 MG/DL — HIGH (ref 70–99)
HCT VFR BLD CALC: 36.9 % — LOW (ref 39–50)
HGB BLD-MCNC: 12.1 G/DL — LOW (ref 13–17)
IMM GRANULOCYTES NFR BLD AUTO: 0.4 % — SIGNIFICANT CHANGE UP (ref 0–0.9)
LACTATE SERPL-SCNC: 1 MMOL/L — SIGNIFICANT CHANGE UP (ref 0.5–2)
LYMPHOCYTES # BLD AUTO: 1.35 K/UL — SIGNIFICANT CHANGE UP (ref 1–3.3)
LYMPHOCYTES # BLD AUTO: 19.3 % — SIGNIFICANT CHANGE UP (ref 13–44)
MCHC RBC-ENTMCNC: 31.5 PG — SIGNIFICANT CHANGE UP (ref 27–34)
MCHC RBC-ENTMCNC: 32.8 GM/DL — SIGNIFICANT CHANGE UP (ref 32–36)
MCV RBC AUTO: 96.1 FL — SIGNIFICANT CHANGE UP (ref 80–100)
MONOCYTES # BLD AUTO: 0.46 K/UL — SIGNIFICANT CHANGE UP (ref 0–0.9)
MONOCYTES NFR BLD AUTO: 6.6 % — SIGNIFICANT CHANGE UP (ref 2–14)
NEUTROPHILS # BLD AUTO: 5.07 K/UL — SIGNIFICANT CHANGE UP (ref 1.8–7.4)
NEUTROPHILS NFR BLD AUTO: 72.5 % — SIGNIFICANT CHANGE UP (ref 43–77)
NRBC # BLD: 0 /100 WBCS — SIGNIFICANT CHANGE UP (ref 0–0)
PLATELET # BLD AUTO: 263 K/UL — SIGNIFICANT CHANGE UP (ref 150–400)
POTASSIUM SERPL-MCNC: 4 MMOL/L — SIGNIFICANT CHANGE UP (ref 3.5–5.3)
POTASSIUM SERPL-SCNC: 4 MMOL/L — SIGNIFICANT CHANGE UP (ref 3.5–5.3)
PROT SERPL-MCNC: 6.7 G/DL — SIGNIFICANT CHANGE UP (ref 6–8.3)
RBC # BLD: 3.84 M/UL — LOW (ref 4.2–5.8)
RBC # FLD: 12.4 % — SIGNIFICANT CHANGE UP (ref 10.3–14.5)
SODIUM SERPL-SCNC: 138 MMOL/L — SIGNIFICANT CHANGE UP (ref 135–145)
WBC # BLD: 6.99 K/UL — SIGNIFICANT CHANGE UP (ref 3.8–10.5)
WBC # FLD AUTO: 6.99 K/UL — SIGNIFICANT CHANGE UP (ref 3.8–10.5)

## 2023-10-04 PROCEDURE — 85652 RBC SED RATE AUTOMATED: CPT

## 2023-10-04 PROCEDURE — 73630 X-RAY EXAM OF FOOT: CPT | Mod: 26,LT

## 2023-10-04 PROCEDURE — 85025 COMPLETE CBC W/AUTO DIFF WBC: CPT

## 2023-10-04 PROCEDURE — 73630 X-RAY EXAM OF FOOT: CPT

## 2023-10-04 PROCEDURE — 99285 EMERGENCY DEPT VISIT HI MDM: CPT

## 2023-10-04 PROCEDURE — 87040 BLOOD CULTURE FOR BACTERIA: CPT

## 2023-10-04 PROCEDURE — 83605 ASSAY OF LACTIC ACID: CPT

## 2023-10-04 PROCEDURE — 80053 COMPREHEN METABOLIC PANEL: CPT

## 2023-10-04 PROCEDURE — 86140 C-REACTIVE PROTEIN: CPT

## 2023-10-04 PROCEDURE — 36415 COLL VENOUS BLD VENIPUNCTURE: CPT

## 2023-10-04 PROCEDURE — 99284 EMERGENCY DEPT VISIT MOD MDM: CPT | Mod: 25

## 2023-10-04 RX ORDER — SODIUM CHLORIDE 9 MG/ML
1000 INJECTION INTRAMUSCULAR; INTRAVENOUS; SUBCUTANEOUS ONCE
Refills: 0 | Status: DISCONTINUED | OUTPATIENT
Start: 2023-10-04 | End: 2023-10-08

## 2023-10-04 NOTE — ED PROVIDER NOTE - CLINICAL SUMMARY MEDICAL DECISION MAKING FREE TEXT BOX
has cipro and will atke iut 63-year-old male with past medical history of hypertension, HLD, diabetes, depression/insomnia, GERD, left great toe bunion surgery, presents with persistent left great toe pain, erythema and swelling in the setting of infection. Patient states approximately a month ago he was bitten by his dog on his left great toe area and was started on antibiotics that he took for approximately 5 days. Symptoms did not improve and patient returned and was admitted for 24 hours with IV antibiotics and was sent home on 2 different antibiotics which he took to completion.  Patient states his last antibiotic use was approximately 10 days ago. Patient continues to have persistent left great toe pain, redness and swelling and thus presents to ED today for further evaluation as he was sent in by his podiatrist. Patient was sent in by his podiatrist Dr. oCng Rawls who evaluated him prior to ED arrival and sent him to ED for further evaluation. No fevers.    ED course: VS noted. Pt afebrile and rest of VS WNL. Exam as above. Podiatry consulted and in ED to see pt. Labs noted and unremarkable. ESR and CRP normal. XRs of lft food with NAD, hardware in place. Per podiatry, pt already has Rx for Ciprofloxacin and will be taking it as prescribed. Non-toxic appearing and stable for discharge. To follow up outpatient. Strict return precautions given.

## 2023-10-04 NOTE — CONSULT NOTE ADULT - SUBJECTIVE AND OBJECTIVE BOX
Attending: River Lopez     Patient is a 63y old  Male who presents with a chief complaint of Left great toe pain     HPI: 63-year-old male with past medical history of hypertension, HLD, diabetes, depression/insomnia, GERD presents with persistent left great toe pain, erythema and swelling in the setting of infection.  patient states approximately a month ago he was bitten by his dog on his left great toe area and was started on antibiotics that he took approximately 5 days.  Symptoms did not improve and patient returned and was admitted for 24 hours with IV antibiotics and was sent home on 2 different antibiotics(bactrim/keflex) which she took to completion.  Patient states his last antibiotic use was approximately 10 days ago.  Patient continues to have persistent left great toe pain, redness and swelling and thus presents to ED today for further evaluation as he was sent in by his podiatrist. Patient was sent in by his podiatrist Dr. Delon Rawls who evaluated him prior to ED arrival and sent him to ED for further evaluation. Pt was also seen and evaluated by Dr. River Guerra on 10/2/47366, and pt was concerned at that time of continued pain. Pt was given a rx for cipro. Pt states that he did not take the cirpo and went to Dr. Christine for second opinion who rec pt come ot the ED.  NO fevers. Pt s/p left great toe bunion surgery and old scar noted      Review of systems negative except per HPI    PAST MEDICAL & SURGICAL HISTORY:  Hypertriglyceridemia      Allergic rhinitis      Rectal bleeding      Hypertensive retinopathy      Cataract      ED (erectile dysfunction)      HLD (hyperlipidemia)      HTN (hypertension)      DM (diabetes mellitus)  type 2      Esophageal reflux      Essential tremor      Cough variant asthma  pt denies      BPH (benign prostatic hyperplasia)      Depression      Arthritis      Asthma      GERD (gastroesophageal reflux disease)      Surgery, elective  ankle ligament reconstruction      S/P appendectomy      S/P knee surgery  arthroscopy      H/O colonoscopy      H/O esophagogastroduodenoscopy      H/O knee surgery      H/O shoulder surgery        Home Medications:  docusate sodium 100 mg oral capsule: 1 cap(s) orally 2 times a day (15 Nov 2022 09:19)  escitalopram 5 mg oral tablet: 1 tab(s) orally once a day (15 Nov 2022 09:19)  famotidine 40 mg oral tablet: 1 tab(s) orally once a day (at bedtime) (15 Nov 2022 09:19)  glimepiride 4 mg oral tablet: 1 tab(s) orally 2 times a day (15 Nov 2022 09:19)  Jardiance 25 mg oral tablet: 1 tab(s) orally once a day (11 Sep 2023 23:30)  magnesium oxide 400 mg oral tablet: 1 tab(s) orally once a day (15 Nov 2022 09:19)  metFORMIN 1000 mg oral tablet: 1 tab(s) orally 2 times a day (11 Sep 2023 23:28)  rosuvastatin 10 mg oral tablet: 1 tab(s) orally once a day (at bedtime) (11 Sep 2023 23:32)  traZODone 150 mg oral tablet: 1 tab(s) orally once a day (at bedtime) (11 Sep 2023 23:31)    Allergies    No Known Allergies    Intolerances      FAMILY HISTORY:    Social History:       LABS                        12.1   6.99  )-----------( 263      ( 04 Oct 2023 13:38 )             36.9               Vital Signs Last 24 Hrs  T(C): 37.1 (04 Oct 2023 13:01), Max: 37.1 (04 Oct 2023 13:01)  T(F): 98.7 (04 Oct 2023 13:01), Max: 98.7 (04 Oct 2023 13:01)  HR: 100 (04 Oct 2023 13:01) (100 - 100)  BP: 126/74 (04 Oct 2023 13:01) (126/74 - 126/74)  BP(mean): --  RR: 18 (04 Oct 2023 13:01) (18 - 18)  SpO2: 96% (04 Oct 2023 13:01) (96% - 96%)    Parameters below as of 04 Oct 2023 13:01  Patient On (Oxygen Delivery Method): room air        PHYSICAL EXAM  General: NAD, AA0x3    Lower Extremity Focused: Left foot   Vasc: 2/4 PT/DP  - No edema noted at the L hallux,   Derm: No open wound noted to the left hallux, skin lines presents, and mild discoloration of the distal hallux consistent with resolved prior cellulitis, No proximal streaking, crepitus, or fluctance noted.  surgical scar noted on the dorsal aspect of L MPJ  Neuro: Protective sensation is intact   MSK: Pain on palpation on the plantar aspect of the hallux, Mild HAV noted on the R hallux    RADIOLOGY    < from: Xray Foot AP + Lateral + Oblique, Left (10.04.23 @ 15:32) >  IMPRESSION:  Intact indwelling small narrow cannulated screw in distal 1st metatarsal.   No tracking gas collections or gross radiographic evidence for   osteomyelitis.    No acute fractures or dislocations.    Tarsometatarsal alignment maintained without evidence for a Lisfranc   injury.    Congenitally fused 5th DIP joint. Mild 1st MTP osteoarthritic change.   Preserved remaining joint spaces and no joint margin erosions.    Conspicuous plantar calcaneal enthesophyte. Unremarkable distal Achilles   tendon shadow.    No discrete lytic or blastic lesions.    Scant scattered faint vascular calcifications.    < end of copied text >

## 2023-10-04 NOTE — ED ADULT TRIAGE NOTE - CHIEF COMPLAINT QUOTE
Pt co L 1st toe infection. PT states intermittent infxn, has taken 2 or 3 ABX over past few weeks without relief. Instructed to come to Ed by podiatrist. Denies f/c, drainage from site.

## 2023-10-04 NOTE — ED PROVIDER NOTE - OBJECTIVE STATEMENT
63-year-old male with past medical history of hypertension, HLD, diabetes, depression/insomnia, GERD presents with persistent left great toe pain, erythema and swelling in the setting of infection.  patient states approximately a month ago he was bitten by his dog on his left great toe area and was started on antibiotics that he took approximately 5 days.  Symptoms did not improve and patient returned and was admitted for 24 hours with IV antibiotics and was sent home on 2 different antibiotics which she took to completion.  Patient states his last antibiotic use was approximately 10 days ago.  Patient continues to have persistent left great toe pain, redness and swelling and thus presents to ED today for further evaluation as he was sent in by his podiatrist. Patient was sent in by his podiatrist Dr. Gar's Sinai who evaluated him prior to ED arrival and sent him to ED for further evaluation. NO fevers. 63-year-old male with past medical history of hypertension, HLD, diabetes, depression/insomnia, GERD presents with persistent left great toe pain, erythema and swelling in the setting of infection.  patient states approximately a month ago he was bitten by his dog on his left great toe area and was started on antibiotics that he took approximately 5 days.  Symptoms did not improve and patient returned and was admitted for 24 hours with IV antibiotics and was sent home on 2 different antibiotics which she took to completion.  Patient states his last antibiotic use was approximately 10 days ago.  Patient continues to have persistent left great toe pain, redness and swelling and thus presents to ED today for further evaluation as he was sent in by his podiatrist. Patient was sent in by his podiatrist Dr. Gar's Sinai who evaluated him prior to ED arrival and sent him to ED for further evaluation. NO fevers. Pt s/p left great toe bunion surgery and old scar noted 63-year-old male with past medical history of hypertension, HLD, diabetes, depression/insomnia, GERD, left great toe bunion surgery, presents with persistent left great toe pain, erythema and swelling in the setting of infection. Patient states approximately a month ago he was bitten by his dog on his left great toe area and was started on antibiotics that he took for approximately 5 days. Symptoms did not improve and patient returned and was admitted for 24 hours with IV antibiotics and was sent home on 2 different antibiotics which he took to completion.  Patient states his last antibiotic use was approximately 10 days ago. Patient continues to have persistent left great toe pain, redness and swelling and thus presents to ED today for further evaluation as he was sent in by his podiatrist. Patient was sent in by his podiatrist Dr. Cong Rawls who evaluated him prior to ED arrival and sent him to ED for further evaluation. No fevers.

## 2023-10-04 NOTE — ED PROVIDER NOTE - NSFOLLOWUPINSTRUCTIONS_ED_ALL_ED_FT
Please follow-up with your podiatrist as advised.  Return to the ER for any concerning symptoms.      Cellulitis    Cellulitis is a skin infection caused by bacteria. This condition occurs most often in the arms and lower legs but can occur anywhere over the body. Symptoms include redness, swelling, warm skin, tenderness, and chills/fever. If you were prescribed an antibiotic medicine, take it as told by your health care provider. Do not stop taking the antibiotic even if you start to feel better.    SEEK IMMEDIATE MEDICAL CARE IF YOU HAVE ANY OF THE FOLLOWING SYMPTOMS: worsening fever, red streaks coming from affected area, vomiting or diarrhea, or dizziness/lightheadedness.

## 2023-10-04 NOTE — ED PROVIDER NOTE - MUSCULOSKELETAL, MLM
Spine appears normal, range of motion is not limited, left great toe with mild swelling and redness, old scar noted, no abscess or discharge

## 2023-10-04 NOTE — CONSULT NOTE ADULT - ASSESSMENT
Patient is a 63y old  Male with PMHx of DM, asthma, HLD, HTN, and anxiety who comes in for continued pain to left hallux. Pt was seen by Dr Guerra on 10/2 and was given a 7 day course of cipro, but pt has not taken any. Pt saw Dr. Christine today who rec pt come to the ED for further evaluation  At the time of consult VSS, WBC stable and ESR and CRP pending. XRs were unchanged from previous. Rec pt be discharged home to complete prescribed abx from 10/2 and to f/u outpt with Dr. Christine or Dr. Guerra per pt wish.    Plan:     -XR were reviewed and discussed with pt  - Complete prior prescribed cipro course from 10/2 by Dr. Guerra.   - Educated pt on healing process and the importance to rest and utilize comfortable well padded and sized shoes.     If pt chooses to f/u with Dr. Guerra, Can follow up in 1-2 weeks from discharge      Office information:          [place] Address- [address], [place] [address] Phone: [phone]         [place] Address- [address], [place] [address] Phone: [phone]      Plan d/w attending  Patient is a 63y old  Male with PMHx of DM, asthma, HLD, HTN, and anxiety who comes in for continued pain to left hallux. Pt was seen by Dr Guerra on 10/2 and was given a 7 day course of cipro, but pt has not taken any. Pt saw Dr. Christine today who rec pt come to the ED for further evaluation  At the time of consult VSS, WBC stable and ESR and CRP pending. XRs were unchanged from previous exam. Physical with no open wound or acute clinical signs of infections. Mild skin discoloration of the great hallux and dry flaky skin border, inline with resolving cellulitis. Rec pt be discharged home to complete prescribed abx from 10/2 and to f/u outpt with Dr. Christine or Dr. Guerra per pt wish.    Plan:     -XR were reviewed and discussed with pt  - Complete prior prescribed cipro course from 10/2 by Dr. Guerra.   - Educated pt on healing process and the importance to rest and utilize comfortable well padded and sized shoes.     If pt chooses to f/u with Dr. Guerra, Can follow up in 1-2 weeks from discharge      Office information:          [place] Address- [address] [address] Phone: [phone]         [place] Address- [address], [place] [address] Phone: [phone]      Plan d/w attending

## 2023-10-04 NOTE — ED PROVIDER NOTE - PATIENT PORTAL LINK FT
You can access the FollowMyHealth Patient Portal offered by [facility] by registering at the following website: [email or web address]. By joining [employer]’s FollowMyHealth portal, you will also be able to view your health information using other applications (apps) compatible with our system.

## 2023-10-04 NOTE — ED ADULT NURSE NOTE - NSFALLUNIVINTERV_ED_ALL_ED
Bed/Stretcher in lowest position, wheels locked, appropriate side rails in place/Call bell, personal items and telephone in reach/Instruct patient to call for assistance before getting out of bed/chair/stretcher/Non-slip footwear applied when patient is off stretcher/[place] to call system/Physically safe environment - no spills, clutter or unnecessary equipment/Purposeful proactive rounding/Room/bathroom lighting operational, light cord in reach

## 2023-10-04 NOTE — ED ADULT NURSE NOTE - OBJECTIVE STATEMENT
63 y.o. Male c/o L first toe infection. S/p dog bite 9/3/23. Since incident has been prescribed multiple abx & seen podiatrist but pain, swelling, & redness persist. Denies CP, SOB, fevers/chills, drainage, from site, numbness/tingling. Cap refill <2 seconds.

## 2023-11-13 PROCEDURE — 93005 ELECTROCARDIOGRAM TRACING: CPT

## 2023-11-13 PROCEDURE — 99285 EMERGENCY DEPT VISIT HI MDM: CPT | Mod: 25

## 2023-11-13 PROCEDURE — 84100 ASSAY OF PHOSPHORUS: CPT

## 2023-11-13 PROCEDURE — 85025 COMPLETE CBC W/AUTO DIFF WBC: CPT

## 2023-11-13 PROCEDURE — 85652 RBC SED RATE AUTOMATED: CPT

## 2023-11-13 PROCEDURE — 83735 ASSAY OF MAGNESIUM: CPT

## 2023-11-13 PROCEDURE — 36415 COLL VENOUS BLD VENIPUNCTURE: CPT

## 2023-11-13 PROCEDURE — 83036 HEMOGLOBIN GLYCOSYLATED A1C: CPT

## 2023-11-13 PROCEDURE — 96374 THER/PROPH/DIAG INJ IV PUSH: CPT

## 2023-11-13 PROCEDURE — 80053 COMPREHEN METABOLIC PANEL: CPT

## 2023-11-13 PROCEDURE — 82962 GLUCOSE BLOOD TEST: CPT

## 2023-11-13 PROCEDURE — 73620 X-RAY EXAM OF FOOT: CPT

## 2023-11-13 PROCEDURE — 96375 TX/PRO/DX INJ NEW DRUG ADDON: CPT

## 2023-11-13 PROCEDURE — 86140 C-REACTIVE PROTEIN: CPT

## 2023-11-13 PROCEDURE — 87040 BLOOD CULTURE FOR BACTERIA: CPT

## 2023-11-22 ENCOUNTER — APPOINTMENT (OUTPATIENT)
Dept: OTOLARYNGOLOGY | Facility: CLINIC | Age: 63
End: 2023-11-22
Payer: COMMERCIAL

## 2023-11-22 ENCOUNTER — EMERGENCY (EMERGENCY)
Facility: HOSPITAL | Age: 63
LOS: 1 days | Discharge: HOME | End: 2023-11-22
Admitting: EMERGENCY MEDICINE
Payer: MEDICARE

## 2023-11-22 VITALS
RESPIRATION RATE: 18 BRPM | TEMPERATURE: 98 F | DIASTOLIC BLOOD PRESSURE: 68 MMHG | OXYGEN SATURATION: 98 % | WEIGHT: 156.09 LBS | SYSTOLIC BLOOD PRESSURE: 108 MMHG | HEIGHT: 64 IN | HEART RATE: 78 BPM

## 2023-11-22 VITALS
BODY MASS INDEX: 27.66 KG/M2 | HEART RATE: 91 BPM | DIASTOLIC BLOOD PRESSURE: 62 MMHG | OXYGEN SATURATION: 98 % | SYSTOLIC BLOOD PRESSURE: 111 MMHG | WEIGHT: 162 LBS | HEIGHT: 64 IN | TEMPERATURE: 98 F

## 2023-11-22 DIAGNOSIS — Z98.890 OTHER SPECIFIED POSTPROCEDURAL STATES: Chronic | ICD-10-CM

## 2023-11-22 DIAGNOSIS — Z41.9 ENCOUNTER FOR PROCEDURE FOR PURPOSES OTHER THAN REMEDYING HEALTH STATE, UNSPECIFIED: Chronic | ICD-10-CM

## 2023-11-22 DIAGNOSIS — Z90.49 ACQUIRED ABSENCE OF OTHER SPECIFIED PARTS OF DIGESTIVE TRACT: Chronic | ICD-10-CM

## 2023-11-22 DIAGNOSIS — H90.A32 MIXED CONDUCTIVE AND SENSORINEURAL HEARING, UNILATERAL, LEFT EAR WITH RESTRICTED HEARING ON THE  CONTRALATERAL SIDE: ICD-10-CM

## 2023-11-22 LAB
ANION GAP SERPL CALC-SCNC: 9 MMOL/L — SIGNIFICANT CHANGE UP (ref 5–17)
ANION GAP SERPL CALC-SCNC: 9 MMOL/L — SIGNIFICANT CHANGE UP (ref 5–17)
BASOPHILS # BLD AUTO: 0.02 K/UL — SIGNIFICANT CHANGE UP (ref 0–0.2)
BASOPHILS # BLD AUTO: 0.02 K/UL — SIGNIFICANT CHANGE UP (ref 0–0.2)
BASOPHILS NFR BLD AUTO: 0.4 % — SIGNIFICANT CHANGE UP (ref 0–2)
BASOPHILS NFR BLD AUTO: 0.4 % — SIGNIFICANT CHANGE UP (ref 0–2)
BUN SERPL-MCNC: 18 MG/DL — SIGNIFICANT CHANGE UP (ref 7–23)
BUN SERPL-MCNC: 18 MG/DL — SIGNIFICANT CHANGE UP (ref 7–23)
CALCIUM SERPL-MCNC: 9.2 MG/DL — SIGNIFICANT CHANGE UP (ref 8.4–10.5)
CALCIUM SERPL-MCNC: 9.2 MG/DL — SIGNIFICANT CHANGE UP (ref 8.4–10.5)
CHLORIDE SERPL-SCNC: 104 MMOL/L — SIGNIFICANT CHANGE UP (ref 96–108)
CHLORIDE SERPL-SCNC: 104 MMOL/L — SIGNIFICANT CHANGE UP (ref 96–108)
CO2 SERPL-SCNC: 25 MMOL/L — SIGNIFICANT CHANGE UP (ref 22–31)
CO2 SERPL-SCNC: 25 MMOL/L — SIGNIFICANT CHANGE UP (ref 22–31)
CREAT SERPL-MCNC: 0.8 MG/DL — SIGNIFICANT CHANGE UP (ref 0.5–1.3)
CREAT SERPL-MCNC: 0.8 MG/DL — SIGNIFICANT CHANGE UP (ref 0.5–1.3)
EGFR: 99 ML/MIN/1.73M2 — SIGNIFICANT CHANGE UP
EGFR: 99 ML/MIN/1.73M2 — SIGNIFICANT CHANGE UP
EOSINOPHIL # BLD AUTO: 0.04 K/UL — SIGNIFICANT CHANGE UP (ref 0–0.5)
EOSINOPHIL # BLD AUTO: 0.04 K/UL — SIGNIFICANT CHANGE UP (ref 0–0.5)
EOSINOPHIL NFR BLD AUTO: 0.9 % — SIGNIFICANT CHANGE UP (ref 0–6)
EOSINOPHIL NFR BLD AUTO: 0.9 % — SIGNIFICANT CHANGE UP (ref 0–6)
GLUCOSE SERPL-MCNC: 126 MG/DL — HIGH (ref 70–99)
GLUCOSE SERPL-MCNC: 126 MG/DL — HIGH (ref 70–99)
HCT VFR BLD CALC: 37.8 % — LOW (ref 39–50)
HCT VFR BLD CALC: 37.8 % — LOW (ref 39–50)
HGB BLD-MCNC: 12.6 G/DL — LOW (ref 13–17)
HGB BLD-MCNC: 12.6 G/DL — LOW (ref 13–17)
IMM GRANULOCYTES NFR BLD AUTO: 0.4 % — SIGNIFICANT CHANGE UP (ref 0–0.9)
IMM GRANULOCYTES NFR BLD AUTO: 0.4 % — SIGNIFICANT CHANGE UP (ref 0–0.9)
LYMPHOCYTES # BLD AUTO: 1.49 K/UL — SIGNIFICANT CHANGE UP (ref 1–3.3)
LYMPHOCYTES # BLD AUTO: 1.49 K/UL — SIGNIFICANT CHANGE UP (ref 1–3.3)
LYMPHOCYTES # BLD AUTO: 32.1 % — SIGNIFICANT CHANGE UP (ref 13–44)
LYMPHOCYTES # BLD AUTO: 32.1 % — SIGNIFICANT CHANGE UP (ref 13–44)
MCHC RBC-ENTMCNC: 31.5 PG — SIGNIFICANT CHANGE UP (ref 27–34)
MCHC RBC-ENTMCNC: 31.5 PG — SIGNIFICANT CHANGE UP (ref 27–34)
MCHC RBC-ENTMCNC: 33.3 GM/DL — SIGNIFICANT CHANGE UP (ref 32–36)
MCHC RBC-ENTMCNC: 33.3 GM/DL — SIGNIFICANT CHANGE UP (ref 32–36)
MCV RBC AUTO: 94.5 FL — SIGNIFICANT CHANGE UP (ref 80–100)
MCV RBC AUTO: 94.5 FL — SIGNIFICANT CHANGE UP (ref 80–100)
MONOCYTES # BLD AUTO: 0.41 K/UL — SIGNIFICANT CHANGE UP (ref 0–0.9)
MONOCYTES # BLD AUTO: 0.41 K/UL — SIGNIFICANT CHANGE UP (ref 0–0.9)
MONOCYTES NFR BLD AUTO: 8.8 % — SIGNIFICANT CHANGE UP (ref 2–14)
MONOCYTES NFR BLD AUTO: 8.8 % — SIGNIFICANT CHANGE UP (ref 2–14)
NEUTROPHILS # BLD AUTO: 2.66 K/UL — SIGNIFICANT CHANGE UP (ref 1.8–7.4)
NEUTROPHILS # BLD AUTO: 2.66 K/UL — SIGNIFICANT CHANGE UP (ref 1.8–7.4)
NEUTROPHILS NFR BLD AUTO: 57.4 % — SIGNIFICANT CHANGE UP (ref 43–77)
NEUTROPHILS NFR BLD AUTO: 57.4 % — SIGNIFICANT CHANGE UP (ref 43–77)
NRBC # BLD: 0 /100 WBCS — SIGNIFICANT CHANGE UP (ref 0–0)
NRBC # BLD: 0 /100 WBCS — SIGNIFICANT CHANGE UP (ref 0–0)
PLATELET # BLD AUTO: 251 K/UL — SIGNIFICANT CHANGE UP (ref 150–400)
PLATELET # BLD AUTO: 251 K/UL — SIGNIFICANT CHANGE UP (ref 150–400)
POTASSIUM SERPL-MCNC: 4 MMOL/L — SIGNIFICANT CHANGE UP (ref 3.5–5.3)
POTASSIUM SERPL-MCNC: 4 MMOL/L — SIGNIFICANT CHANGE UP (ref 3.5–5.3)
POTASSIUM SERPL-SCNC: 4 MMOL/L — SIGNIFICANT CHANGE UP (ref 3.5–5.3)
POTASSIUM SERPL-SCNC: 4 MMOL/L — SIGNIFICANT CHANGE UP (ref 3.5–5.3)
RBC # BLD: 4 M/UL — LOW (ref 4.2–5.8)
RBC # BLD: 4 M/UL — LOW (ref 4.2–5.8)
RBC # FLD: 13.1 % — SIGNIFICANT CHANGE UP (ref 10.3–14.5)
RBC # FLD: 13.1 % — SIGNIFICANT CHANGE UP (ref 10.3–14.5)
SODIUM SERPL-SCNC: 138 MMOL/L — SIGNIFICANT CHANGE UP (ref 135–145)
SODIUM SERPL-SCNC: 138 MMOL/L — SIGNIFICANT CHANGE UP (ref 135–145)
TROPONIN T, HIGH SENSITIVITY RESULT: 7 NG/L — SIGNIFICANT CHANGE UP (ref 0–51)
TROPONIN T, HIGH SENSITIVITY RESULT: 7 NG/L — SIGNIFICANT CHANGE UP (ref 0–51)
WBC # BLD: 4.64 K/UL — SIGNIFICANT CHANGE UP (ref 3.8–10.5)
WBC # BLD: 4.64 K/UL — SIGNIFICANT CHANGE UP (ref 3.8–10.5)
WBC # FLD AUTO: 4.64 K/UL — SIGNIFICANT CHANGE UP (ref 3.8–10.5)
WBC # FLD AUTO: 4.64 K/UL — SIGNIFICANT CHANGE UP (ref 3.8–10.5)

## 2023-11-22 PROCEDURE — 99285 EMERGENCY DEPT VISIT HI MDM: CPT

## 2023-11-22 PROCEDURE — 80048 BASIC METABOLIC PNL TOTAL CA: CPT

## 2023-11-22 PROCEDURE — G1004: CPT

## 2023-11-22 PROCEDURE — 71046 X-RAY EXAM CHEST 2 VIEWS: CPT | Mod: 26

## 2023-11-22 PROCEDURE — 71046 X-RAY EXAM CHEST 2 VIEWS: CPT

## 2023-11-22 PROCEDURE — 99213 OFFICE O/P EST LOW 20 MIN: CPT | Mod: 25

## 2023-11-22 PROCEDURE — 85025 COMPLETE CBC W/AUTO DIFF WBC: CPT

## 2023-11-22 PROCEDURE — 69220 CLEAN OUT MASTOID CAVITY: CPT | Mod: LT

## 2023-11-22 PROCEDURE — 70450 CT HEAD/BRAIN W/O DYE: CPT | Mod: 26,MG

## 2023-11-22 PROCEDURE — 93005 ELECTROCARDIOGRAM TRACING: CPT

## 2023-11-22 PROCEDURE — 70450 CT HEAD/BRAIN W/O DYE: CPT | Mod: MG

## 2023-11-22 PROCEDURE — 96374 THER/PROPH/DIAG INJ IV PUSH: CPT

## 2023-11-22 PROCEDURE — 36415 COLL VENOUS BLD VENIPUNCTURE: CPT

## 2023-11-22 PROCEDURE — 99285 EMERGENCY DEPT VISIT HI MDM: CPT | Mod: 25

## 2023-11-22 PROCEDURE — 84484 ASSAY OF TROPONIN QUANT: CPT

## 2023-11-22 RX ORDER — KETOROLAC TROMETHAMINE 30 MG/ML
15 SYRINGE (ML) INJECTION ONCE
Refills: 0 | Status: DISCONTINUED | OUTPATIENT
Start: 2023-11-22 | End: 2023-11-22

## 2023-11-22 RX ORDER — OFLOXACIN OTIC 3 MG/ML
0.3 SOLUTION AURICULAR (OTIC) TWICE DAILY
Qty: 1 | Refills: 0 | Status: ACTIVE | COMMUNITY
Start: 2023-11-22 | End: 1900-01-01

## 2023-11-22 RX ADMIN — Medication 15 MILLIGRAM(S): at 13:11

## 2023-11-22 NOTE — ED PROVIDER NOTE - PHYSICAL EXAMINATION
CONSTITUTIONAL: Awake, alert.  Nontoxic, no acute distress.    HEAD: Normocephalic, atraumatic.    EYES: Conjunctivae clear without exudates or hemorrhage. Sclera is non-icteric.    ENT: Normal appearing external ears, nose, mucous membranes moist.    NECK: supple, trachea midline.  No midline or paraspinal ttp.  FROM.    HEART:  Normal rate, regular rhythm.  Heart sounds S1, S2.  No murmurs, rubs or gallops.    LUNGS:  No acute respiratory distress.  Non-tachypneic and non-labored.  Lungs are clear bilaterally with good aeration.  No wheezing, rales, rhonchi.    CHEST WALL:  No obvious deformity/rash.  +ttp to L anterior chest wall.    BACK: No obvious deformity.  No midline ttp. +ttp to L thoracic paraspinal region    ABDOMEN: Normal appearing skin without lesions, rashes.  Normal bowel sounds x 4.  Soft, non-distended, non-tender in all four quadrants. No rebound or guarding. No hernias or masses palpable.  No pulsatile abdominal mass.   No CVA tenderness b/l.    MUSCULOSKELETAL:  +small bruise to L lateral hip.  No swelling.  Warm. No focal tenderness.  FROM b/l upper and lower extremities.  5/5 strength b/l upper and lower ext.  Sensation and motor function grossly intact.  Strong equal peripheral pulses b/l.   Cap refill < 2 b/l upper and lower ext.  All compartments soft.    SKIN: Skin in warm, dry and intact without rashes or lesions.  Appropriate color for ethnicity.    NEUROLOGICAL:  Awake, alert and oriented x 3.  Normal speech and cognition.  Face symmetric with equal sensation to light touch throughout, PERRL, EOMI, no nystagmus, hearing grossly equal and intact b/l, no tongue deviation, intact strength upon turning head against resistance b/l, No gross motor deficit, 5/5 strength throughout, no gross sensory loss to light touch b/l upper and lower ext, normal movement.  No dysmetria on finger to nose testing.  Neg pronator drift. Normal gait.     PSYCH: Appropriate mood and affect. Good judgment and insight. DISPLAY PLAN FREE TEXT

## 2023-11-22 NOTE — ED PROVIDER NOTE - NS ED ROS FT
CONSTITUTIONAL: Denies fever and chills    RESPIRATORY: +int SOB    CARDIOVASCULAR: +chest pain.    GASTROINTESTINAL: Denies abdominal pain, nausea, vomiting and diarrhea.    MUSCULOSKELETAL: See HPI    NEUROLOGICAL: +ha

## 2023-11-22 NOTE — ED PROVIDER NOTE - CLINICAL SUMMARY MEDICAL DECISION MAKING FREE TEXT BOX
64 y/o male w/ hx htn, hld, dm, gerd p/w constant L sided chest pain radiating to L upper back and occasionally L arm x 5 days, worse with palpation.  Not pleuritic.  Pt states had a mechanical trip and fall down 10 steps 1 wk ago.  States rolled forward down steps, +head strike.  Denies loc.  States bystanders helped him up and he was ambulatory after.  Noted mild generalized ha for 3-4 days after incident, which has improved, but still slightly present.  Denies asa/ ac use.  Did not seek medical attention after fall.  On ROS, admits to occasional mild sob.  Denies CHAPMAN.  +mild L hip pain.  +mild chronic neck pain, unchanged.  Denies f/c, cough, abd pain, n/v/d, numbness/tingling/weakness to ext.  Denies smoking, travel, or hx dvt/pe.  Denies prior cardiac hx.  Exam with reproducible pain/ttp to L anterior chest wall/ L upper back  Normal cardiopulm exam  Reassuring neuro exam  Overall suspect chest/back pain likely msk pain from recent fall, however would like to screen for acs, pna, ptx.  Doubt PE, dissection.    Would also like to r/o ICH due to mechanism (10 steps) though suspicion low, as no LOC, minimal pain.  No asa/ac use  Will get basic labs, ekg, cxr, ct head  Per NEXUS no indication for CT C spine  Small bruise L hip without focal ttp. FROM, pt declining xr to L hip.  Suspicion low for fx  Pain meds, re-eval  --  W/u reassuring  D/w pt results  Advised close f/u with pmd  Supportive care, strict return precautions

## 2023-11-22 NOTE — ED ADULT TRIAGE NOTE - CHIEF COMPLAINT QUOTE
Pt co L upper back pain x7 days s/p mechanical trip and fall down the stairs. + head injury at the time, no LOC, no AC use. Ambulatory w steady gait.

## 2023-11-22 NOTE — ED PROVIDER NOTE - OBJECTIVE STATEMENT
62 y/o male w/ hx htn, hld, dm, gerd p/w constant L sided chest pain radiating to L upper back and occasionally L arm x 5 days, worse with palpation.  Not pleuritic.  Pt states had a mechanical trip and fall down 10 steps 1 wk ago.  States rolled forward down steps, +head strike.  Denies loc.  States bystanders helped him up and he was ambulatory after.  Noted mild generalized ha for 3-4 days after incident, which has improved, but still slightly present.  Denies asa/ ac use.  Did not seek medical attention after fall.  On ROS, admits to occasional mild sob.  Denies CHAPMAN.  +mild L hip pain.  +mild chronic neck pain, unchanged.  Denies f/c, cough, abd pain, n/v/d, numbness/tingling/weakness to ext.  Denies smoking, travel, or hx dvt/pe.  Denies prior cardiac hx.

## 2023-11-22 NOTE — ED ADULT NURSE NOTE - NSFALLRISKINTERV_ED_ALL_ED

## 2023-11-22 NOTE — ED PROVIDER NOTE - NSFOLLOWUPINSTRUCTIONS_ED_ALL_ED_FT
Thank you for visiting [facility] Emergency Department.      We saw you today for pain after a fall.    PAIN CONTROL:   You may take ibuprofen (Motrin, Advil) 600 mg (3 regular tablets) every 6 hours as needed for pain.  Please take with food.  Stop taking if you develop abdominal pain, dark/ bloody stools.  Do not mix with other NSAIDS (ie. Naproxen, Aleve, Celecoxib).  You may also take acetaminophen (Tylenol) 650-975mg (2-3 regular tablets) or 500-1000mg (1-2 extra strength tablets) every 6 hours as needed for pain.  Do not exceed 4000 mg in 1 day. These medications may be bought over the counter.    I recommend alternating the Ibuprofen and Tylenol so you are getting medications around the clock.  For example take the Ibuprofen, then 3 hours later take the Tylenol, then 3 hours later take the Ibuprofen, and repeat as needed.    Rest. Apply ice to affected area 20 minutes on, then 20 minutes off.  You may repeat throughout the day.  Please wear ACE wrap as instructed. Elevate affected extremity.    I suspect your chest pain is likely muscular, however you should schedule an appointment with your cardiologist for re-evaluation.    Please know that no emergency visit is complete without follow-up with your primary care provider in 1 week.  Please bring copies of all discharge papers and results and show to your doctor.      Please continue taking all previous medications as instructed unless we discussed otherwise.     I appreciated your patience and hope you feel better soon.     Return to ER immediately if you develop fevers, chills, worsening chest pain, shortness of breath, worsening and/or any concerning symptoms.

## 2023-11-22 NOTE — ED ADULT NURSE NOTE - OBJECTIVE STATEMENT
63y M pmHx DM, presents to ED c/o pain s/p mechanical trip and fall down the stairs 10days ago. Endorses + head strike, tumbling towards L side, w/  residual pain to L upper back, shoulder, arm. Pt also c/o bruising to L 1st toe, now resolving, a/w tingling sensation. Denies numbness, bowel/bladder dysfunction, LOC, AC use. ROM intact. Bruising noted to L first toe. Pt AAOx4, speaking in full and clear sentences, NAD at this time. Ambulates with steady gait.

## 2023-11-24 DIAGNOSIS — R51.9 HEADACHE, UNSPECIFIED: ICD-10-CM

## 2023-11-24 DIAGNOSIS — M79.602 PAIN IN LEFT ARM: ICD-10-CM
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
## 2023-11-24 DIAGNOSIS — S70.02XA CONTUSION OF LEFT HIP, INITIAL ENCOUNTER: ICD-10-CM

## 2023-11-24 DIAGNOSIS — Z87.19 PERSONAL HISTORY OF OTHER DISEASES OF THE DIGESTIVE SYSTEM: ICD-10-CM

## 2023-11-24 DIAGNOSIS — M54.2 CERVICALGIA: ICD-10-CM
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
## 2023-11-24 DIAGNOSIS — R07.89 OTHER CHEST PAIN: ICD-10-CM

## 2023-11-24 DIAGNOSIS — E11.9 TYPE 2 DIABETES MELLITUS WITHOUT COMPLICATIONS: ICD-10-CM

## 2023-11-24 DIAGNOSIS — I10 ESSENTIAL (PRIMARY) HYPERTENSION: ICD-10-CM

## 2023-11-24 DIAGNOSIS — G89.29 OTHER CHRONIC PAIN: ICD-10-CM

## 2023-11-24 DIAGNOSIS — R06.02 SHORTNESS OF BREATH: ICD-10-CM

## 2023-11-24 DIAGNOSIS — E78.5 HYPERLIPIDEMIA, UNSPECIFIED: ICD-10-CM

## 2023-11-24 DIAGNOSIS — M54.89 OTHER DORSALGIA: ICD-10-CM

## 2023-11-27 ENCOUNTER — EMERGENCY (EMERGENCY)
Facility: HOSPITAL | Age: 63
LOS: 1 days | Discharge: HOME | End: 2023-11-27
Admitting: EMERGENCY MEDICINE
Payer: MEDICARE

## 2023-11-27 VITALS
OXYGEN SATURATION: 98 % | WEIGHT: 179.9 LBS | HEART RATE: 75 BPM | TEMPERATURE: 98 F | DIASTOLIC BLOOD PRESSURE: 78 MMHG | HEIGHT: 64 IN | RESPIRATION RATE: 20 BRPM | SYSTOLIC BLOOD PRESSURE: 123 MMHG

## 2023-11-27 DIAGNOSIS — Z98.890 OTHER SPECIFIED POSTPROCEDURAL STATES: Chronic | ICD-10-CM

## 2023-11-27 DIAGNOSIS — R21 RASH AND OTHER NONSPECIFIC SKIN ERUPTION: ICD-10-CM

## 2023-11-27 DIAGNOSIS — B02.9 ZOSTER WITHOUT COMPLICATIONS: ICD-10-CM

## 2023-11-27 DIAGNOSIS — Z41.9 ENCOUNTER FOR PROCEDURE FOR PURPOSES OTHER THAN REMEDYING HEALTH STATE, UNSPECIFIED: Chronic | ICD-10-CM

## 2023-11-27 DIAGNOSIS — Z90.49 ACQUIRED ABSENCE OF OTHER SPECIFIED PARTS OF DIGESTIVE TRACT: Chronic | ICD-10-CM

## 2023-11-27 PROCEDURE — 99284 EMERGENCY DEPT VISIT MOD MDM: CPT

## 2023-11-27 PROCEDURE — 99282 EMERGENCY DEPT VISIT SF MDM: CPT

## 2023-11-27 RX ORDER — VALACYCLOVIR 500 MG/1
1 TABLET, FILM COATED ORAL
Qty: 21 | Refills: 0
Start: 2023-11-27 | End: 2023-12-03

## 2023-11-27 RX ORDER — IBUPROFEN 200 MG
1 TABLET ORAL
Qty: 15 | Refills: 0
Start: 2023-11-27 | End: 2023-12-01

## 2023-11-27 RX ORDER — OXYCODONE AND ACETAMINOPHEN 5; 325 MG/1; MG/1
1 TABLET ORAL
Qty: 6 | Refills: 0
Start: 2023-11-27 | End: 2023-11-28

## 2023-11-27 NOTE — ED PROVIDER NOTE - CPE EDP PSYCH NORM
IP Acute Occupational Therapy Evaluation  Plan of Care Note    Assessment: Patient presents below baseline which was modified independent with ADLs . Pt needs max extra time this session due to need for frequent rest breaks, dizziness and low BP (80/54 in sitting, then increased to 95/84). HR also varied between 74 and 144 with minimal exertion. Pt educated on shower transfers, car transfers, dressing strategies and options for DME to make environment more conducive to physical abilities. Pt reports she feels comfortable with home d/c as she has adequate assist at home, however, feel she would benefit from home OT due to poor activity tolerance and balance deficits.     Recommendations and Plan:  OT Identified Barriers to Discharge: weakness  Recommendations for Discharge: OT: Home;Home therapy (06/10/17 0926)         Below is key objective and subjective information from the last 24 hours.  For further details and goals, please refer to the OT Assess/Treat/Goals flowsheet.    Diagnosis:  1. Trimalleolar fracture of ankle, closed, left, initial encounter        Subjective: Subjective: I need help to get out of bed (06/10/17 0926)    Precautions:  Precautions  Weight Bearing Status: (P) Non-weight bearing left lower extremity (06/10/17 0820)  Weight Bearing Status Comments: (P) Good maintenace of NWB status on LLE  (06/10/17 0820)  Other Precautions: NWB LLE (06/10/17 0926)    Prior Living Situation:  Type of Home: House (06/10/17 0800)  Lives With: Spouse;Son;Daughter (06/10/17 0800)    ADLs:  Self Cares/ADL's  Grooming Assistance: Supervision (06/10/17 0926)  Upper Body Dressing Assistance: Supervision (06/10/17 0926)  Lower Body Clothing Assistance: Minimal Assist (Min) (06/10/17 0926)  Toileting Assistance: Supervision (06/10/17 0926)    Household Mobility:  Household Mobility  Rolling: Modified Independent (06/10/17 0926)  Supine to Sit: Modified Independent (06/10/17 0926)  Sit to Stand: Modified Independent  (06/10/17 0926)  Stand to Sit: Minimal Assist (Min) (06/10/17 0926)  Stand Pivot Transfers: Supervision (06/10/17 0926)  Sitting - Static: Supervision (06/10/17 0926)  Sitting - Dynamic: Supervision (06/10/17 0926)  Standing - Static: Supervision (06/10/17 0926)  Standing - Dynamic: Supervision (06/10/17 0926)    Home Management:       Education:   On this date, the patient was educated on role of OT.    The response to education was: Verbalizes understanding.    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

## 2023-11-27 NOTE — ED PROVIDER NOTE - SKIN, MLM
+ vesicles on an erythematous base to L T4 dermatone (L chest and few vesicles to back), no crossing of midline, no pus, no bleeding, no satellite lesions

## 2023-11-27 NOTE — ED PROVIDER NOTE - NSFOLLOWUPINSTRUCTIONS_ED_ALL_ED_FT
Shingles  A rash on the skin.  Shingles is an infection. It gives you a painful skin rash and blisters that have fluid in them. Shingles is caused by the same germ (virus) that causes chickenpox.  Shingles only happens in people who:  Have had chickenpox.  Have been given a shot (vaccine) to protect against chickenpox. Shingles is rare in this group.  What are the causes?  This condition is caused by varicella-zoster virus. This is the same germ that causes chickenpox. After a person is exposed to the germ, the germ stays in the body but is not active (dormant).  Shingles develops if the germ becomes active again (is reactivated). This can happen many years after the first exposure to the germ. It is not known what causes this germ to become active again.  What increases the risk?  People who have had chickenpox or received the chickenpox shot are at risk for shingles. This infection is more common in people who:  Are older than 60 years of age.  Have a weakened disease-fighting system (immune system), such as people with:  HIV (human immunodeficiency virus).  AIDS (acquired immunodeficiency syndrome).  Cancer.  Are taking medicines that weaken the immune system, such as organ transplant medicines.  Have a lot of stress.  What are the signs or symptoms?  The first symptoms of shingles may be itching, tingling, or pain in an area on your skin.  A rash will show on your skin a few days or weeks later. This is what usually happens:  The rash is likely to be on one side of your body.  The rash usually has a shape like a belt or a band. Over time, the rash turns into fluid-filled blisters.  The blisters will break open and change into scabs.  The scabs usually dry up in about 2–3 weeks.  You may also have:  A fever.  Chills.  A headache.  A feeling like you may vomit (nausea).  How is this treated?  The rash may last for several weeks. There is not a specific cure for this condition.  Your doctor may prescribe medicines. Medicines may:  Help with pain.  Help you get better sooner.  Help to prevent long-term problems.  Help with itching (antihistamines).  If the area involved is on your face, you may need to see a specialist. This may be an eye doctor or an ear, nose, and throat (ENT) doctor.  Follow these instructions at home:  Medicines  Take over-the-counter and prescription medicines only as told by your doctor.  Put on an anti-itch cream or numbing cream where you have a rash, blisters, or scabs. Do this as told by your doctor.  Helping with itching and discomfort  A bathtub filled with water.   Put cold, wet cloths (cold compresses) on the area of the rash or blisters as told by your doctor.  Cool baths can help you feel better. Try adding baking soda or dry oatmeal to the water to lessen itching. Do not bathe in hot water.  Use calamine lotion as told by your doctor.  Blister and rash care  Keep your rash covered with a loose bandage (dressing).  Wear loose clothing that does not rub on your rash.  Wash your hands with soap and water for at least 20 seconds before and after you change your bandage. If you cannot use soap and water, use hand .  Change your bandage as told by your doctor.  Keep your rash and blisters clean. To do this, wash the area with mild soap and cool water as told by your doctor.  Check your rash every day for signs of infection. Check for:  More redness, swelling, or pain.  Fluid or blood.  Warmth.  Pus or a bad smell.  Do not scratch your rash. Do not pick at your blisters. To help you to not scratch:  Keep your fingernails clean and cut short.  Wear gloves or mittens when you sleep, if scratching is a problem.  General instructions  Rest as told by your doctor.  Wash your hands often with soap and water for at least 20 seconds. If you cannot use soap and water, use hand . Doing this lowers your chance of getting a skin infection.  Your infection can cause chickenpox in people who have never had chickenpox or never got a chickenpox vaccine shot. If you have blisters that did not change into scabs yet, try not to touch other people or be around other people, especially:  Babies.  Pregnant women.  Children who have areas of red, itchy, or rough skin (eczema).  Older people who have organ transplants.  People who have a long-term (chronic) illness, like cancer or AIDS.  Keep all follow-up visits.  How is this prevented?  A vaccine shot is the best way to prevent shingles and protect against shingles problems.  If you have not had a vaccine shot, talk with your doctor about getting it.  Where to find more information  Centers for Disease Control and Prevention: www.cdc.gov  Contact a doctor if:  Your pain does not get better with medicine.  Your pain does not get better after the rash heals.  You have any of these signs of infection around the rash:  More redness, swelling, or pain.  Fluid or blood.  Warmth.  Pus or a bad smell.  You have a fever.  Get help right away if:  The rash is on your face or nose.  You have pain in your face or pain by your eye.  You lose feeling on one side of your face.  You have trouble seeing.  You have ear pain, or you have ringing in your ear.  You have a loss of taste.  Your condition gets worse.  Summary  Shingles gives you a painful skin rash and blisters that have fluid in them.  Shingles is caused by the same germ (virus) that causes chickenpox.  Keep your rash covered with a loose bandage. Wear loose clothing that does not rub on your rash.  If you have blisters that did not change into scabs yet, try not to touch other people or be around people.

## 2023-11-27 NOTE — ED PROVIDER NOTE - PATIENT PORTAL LINK FT
You can access the FollowMyHealth Patient Portal offered by [facility] by registering at the following website: [email or web address]. By joining [facility]’s FollowMyHealth portal, you will also be able to view your health information using other applications (apps) compatible with our system.

## 2023-11-27 NOTE — ED ADULT NURSE NOTE - OBJECTIVE STATEMENT
63y M presents to ED c/o blister like rash to L chest, L upper back x2days, Endorses painful rash, blister like rash x2days. 63y M presents to ED c/o blister like rash to L chest, L upper back x2days, Endorses painful rash, blister like rash x2days. Denies fever/chills, drainage/open blisters, other acute medical sx at this time. Pt AAOx4, speaking in full and clear sentences, NAD at this time. Ambulatory with steady gait. 63y M presents to ED c/o blister like rash to L chest, L upper back x2days, Endorses painful, blister like rash x2days. Denies fever/chills, CP/SOB, drainage/open blisters, other acute medical sx at this time. Pustule like rash to L chest, upper back, no open blisters noted. Pt AAOx4, speaking in full and clear sentences, NAD at this time. Ambulatory with steady gait.

## 2023-11-27 NOTE — ED PROVIDER NOTE - NS ED MD DISPO DISCHARGE
Patient presents to the ED via EMS from home for evaluation of possible stroke. Patient states, \"I was on the phone with my sister on Monday and she said my voice sounded slurred. I was not going to come in but she made me tonight. \"
Home

## 2024-01-29 NOTE — ED PROVIDER NOTE - PATIENT PORTAL LINK FT
Refill Routing Note   Medication(s) are not appropriate for processing by [facility] for the following reason(s):        Drug-disease interaction     [facility] action(s):  Defer        Medication Therapy Plan: FLOS; Tri-Lo-Jennifer active on med list as norgestimate-ethinyl estradioL (TRI-LO-ESTARYLLA)    Pharmacist review requested: Yes     Appointments  past 12m or future 3m with PCP    Date Provider   Last Visit   7/26/2023 Sakshi Rubio MD   Next Visit   2/20/2024 Sakshi Rubio MD   ED visits in past 90 days: 0        Note composed:10:15 AM 01/29/2024           You can access the FollowMyHealth Patient Portal offered by [facility] by registering at the following website: [email or web address]. By joining [facility]’s FollowMyHealth portal, you will also be able to view your health information using other applications (apps) compatible with our system.

## 2024-02-16 ENCOUNTER — APPOINTMENT (OUTPATIENT)
Dept: OTOLARYNGOLOGY | Facility: CLINIC | Age: 64
End: 2024-02-16
Payer: MEDICARE

## 2024-02-16 VITALS
HEIGHT: 64 IN | WEIGHT: 162 LBS | SYSTOLIC BLOOD PRESSURE: 133 MMHG | HEART RATE: 102 BPM | DIASTOLIC BLOOD PRESSURE: 62 MMHG | BODY MASS INDEX: 27.66 KG/M2 | TEMPERATURE: 97.3 F

## 2024-02-16 DIAGNOSIS — J34.89 OTHER SPECIFIED DISORDERS OF NOSE AND NASAL SINUSES: ICD-10-CM

## 2024-02-16 DIAGNOSIS — J31.0 CHRONIC RHINITIS: ICD-10-CM

## 2024-02-16 DIAGNOSIS — H71.02 CHOLESTEATOMA OF ATTIC, LEFT EAR: ICD-10-CM

## 2024-02-16 DIAGNOSIS — H70.12 CHRONIC MASTOIDITIS, LEFT EAR: ICD-10-CM

## 2024-02-16 PROCEDURE — 31231 NASAL ENDOSCOPY DX: CPT

## 2024-02-16 PROCEDURE — 99215 OFFICE O/P EST HI 40 MIN: CPT | Mod: 25

## 2024-02-16 PROCEDURE — 69220 CLEAN OUT MASTOID CAVITY: CPT | Mod: LT

## 2024-02-16 NOTE — HISTORY OF PRESENT ILLNESS
[de-identified] : 2/20/2024 64M with hx of LEFT CWD mastoidectomy for cholesteatoma complaining of pain in the left facial/temporal region. This pain started around 1-2 months ago. No changes in his vision. Also reports pain extends to periorbital region and is associated with nasal congestion. 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  Pt is otherwise doing well from an otologic standpoint.

## 2024-02-16 NOTE — PROCEDURE
[FreeTextEntry3] : - Debridement left ear/mastoid Pre-operative Diagnosis: Left mastoidectomy Post-operative Diagnosis: Same Procedure: Simple debridement left mastoid Procedure Details:   The patient was placed in the supine position.  The operating microscope was positioned.  I then placed the ear speculum in the Left EAC.  Cerumen was then removed using a mixture of otologic curettes, and suction.  The TM was noted to be intact.  The patient tolerated procedure well.  Findings:  Bilateral Ear Canal - normal on right left with evidence of postsurgical change Bilateral Tympanic Membrane - normal  Recommendations: Debrox Complications: None  [FreeTextEntry6] : - Procedure Note Pre-operative Diagnosis:  rhinitis and polyposis on imaging Post-operative Diagnosis: normal exam    Anesthesia: Topical  Procedure: Bilateral nasal endoscopy Procedure Details:   After topical anesthesia and decongestant, the patient was placed in the supine position. The telescope was passed along the left nasal floor to the nasopharynx. It was then passed into the region of the middle meatus, middle turbinate, and the sphenoethmoid region.  An identical procedure was performed on the right side.    Findings:  Mucosa: Normal  Nasal septum: Midline    Discharge: None  Turbinates: Normal  Adenoid: Normal  Posterior choanae: Normal Eustachian tubes: Normal  Mucous stranding: Normal   Lesions: Not present    Comments:  Condition: Stable. Patient tolerated procedure well. Complications: None

## 2024-02-16 NOTE — DATA REVIEWED
[de-identified] : CT t-bone 11/22/24 from [facility], please see report in record

## 2024-02-16 NOTE — ASSESSMENT
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 On exam there is evidence of an oral antral fistula as indicated on CT and am recommending consultation with Dr. Levon Sweeney for evalution and potential combination procedure. In terms of his ear, exam is stable. Recommending follow up with Dr. Ascencio for surveillance in 6mo to establish care.   Plan:  -consultation with Dr. Levon Sweeney, consider closure of oroantral fistula in combination with nasal surgery -appt with neurotology in 1 year for surveillance

## 2024-02-16 NOTE — PHYSICAL EXAM
[Midline] : trachea located in midline position [Normal] : tympanic membranes are normal in both ears [de-identified] : + Evidence of canal wall down mastoidectomy, debrided and cleaned away on the left side

## 2024-02-20 ENCOUNTER — APPOINTMENT (OUTPATIENT)
Dept: OTOLARYNGOLOGY | Facility: CLINIC | Age: 64
End: 2024-02-20
Payer: MEDICARE

## 2024-02-20 VITALS
OXYGEN SATURATION: 98 % | HEIGHT: 64 IN | TEMPERATURE: 97.4 F | HEART RATE: 94 BPM | DIASTOLIC BLOOD PRESSURE: 83 MMHG | RESPIRATION RATE: 17 BRPM | SYSTOLIC BLOOD PRESSURE: 124 MMHG | WEIGHT: 158 LBS | BODY MASS INDEX: 26.98 KG/M2

## 2024-02-20 DIAGNOSIS — J32.0 CHRONIC MAXILLARY SINUSITIS: ICD-10-CM

## 2024-02-20 PROCEDURE — 99213 OFFICE O/P EST LOW 20 MIN: CPT

## 2024-02-20 NOTE — PHYSICAL EXAM
[Midline] : trachea located in midline position [Normal] : no rashes [de-identified] : no appreciable draining tract in the area of the left maxillary maxillary molar tooth

## 2024-02-20 NOTE — HISTORY OF PRESENT ILLNESS
[de-identified] : 2/20/2024 64M referred by Dr. Hyde for oral antral fistula. Patient has prior history of left CWD mastoidectomy for cholesteatoma and presented to ENT recently complaining of pain in left facial/temporal region. CT ordered by Dr. Marquez to evaluate the area which demonstrated a potential oral antral fistula in left maxillary molar area. Patient was also seen by oral surgeon who referred him back to ENT. Patient is asymptomatic in terms of the fistula, denies any drainage to the oral cavity, nasal regurgitation, foul taste in mouth, or any issues with his breathing.

## 2024-05-26 NOTE — PROGRESS NOTE ADULT - PROBLEM/PLAN-6
PROGRESS NOTE         Patient Identification:  Name:  Kennedy Prescott  Age:  75 y.o.  Sex:  male  :  1948  MRN:  [id number]  Visit Number:  [id number]  Primary Care Provider:  Jag Guillaume MD         LOS: 10 days       ----------------------------------------------------------------------------------------------------------------------  Subjective       Chief Complaints:    Nausea and Vomiting        Interval History:      Patient resting comfortably in bed this morning.  No issues or complaints.  Currently on room air with no apparent distress.  Lungs clear to auscultation bilaterally.  Abdomen soft, nontender.  Afebrile, denies diarrhea.  WBC stable at 12.41.      Review of Systems:    Constitutional: no fever, chills and night sweats.  Generalized fatigue.  Eyes: no eye drainage, itching or redness.  HEENT: no mouth sores, dysphagia or nose bleed.  Respiratory: no for shortness of breath, cough or production of sputum.  Cardiovascular: no chest pain, no palpitations, no orthopnea.  Gastrointestinal: no nausea, vomiting or diarrhea. No abdominal pain, hematemesis or rectal bleeding.  Genitourinary: no dysuria or polyuria.  Hematologic/lymphatic: no lymph node abnormalities, no easy bruising or easy bleeding.  Musculoskeletal: no muscle or joint pain.  Skin: No rash and no itching.  Neurological: no loss of consciousness, no seizure, no headache.  Behavioral/Psych: no depression or suicidal ideation.  Endocrine: no hot flashes.  Immunologic: negative.    ----------------------------------------------------------------------------------------------------------------------      Objective       [facility] Meds:  acetaminophen, 650 mg, Oral, Once per day on   ammonium lactate, 1 Application, Topical, Nightly  aspirin, 81 mg, Oral, Daily  atorvastatin, 20 mg, Oral, Daily  cetirizine, 5 mg, Oral, Daily  empagliflozin, 10 mg, Oral, Daily  erythromycin, 1 Application,  Left Eye, BID  famotidine, 20 mg, Oral, Q48H  gabapentin, 100 mg, Oral, Nightly  heparin (porcine), 5,000 Units, Subcutaneous, Q8H  insulin lispro, 2-7 Units, Subcutaneous, 4x Daily AC & at Bedtime  levothyroxine, 75 mcg, Oral, Daily  Lidocaine, 1 patch, Transdermal, Q PM  lubrisoft, 1 Application, Topical, Daily  megestrol, 40 mg, Oral, TID With Meals  metoprolol succinate XL, 12.5 mg, Oral, Nightly  multivitamin with minerals, 1 tablet, Oral, Daily  sertraline, 50 mg, Oral, Nightly  sevelamer, 2,400 mg, Oral, TID With Meals  sodium chloride, 10 mL, Intravenous, Q12H  Vancomycin Pharmacy Intermittent/Pulse Dosing, , Does not apply, Daily           ----------------------------------------------------------------------------------------------------------------------    Vital Signs:  Temp:  [98.5 °F (36.9 °C)-98.6 °F (37 °C)] 98.5 °F (36.9 °C)  Heart Rate:  [69-79] 69  Resp:  [16-18] 17  BP: (116-142)/(61-63) 139/63  No data found.    SpO2 Percentage    05/24/24 1900 05/25/24 1900 05/26/24 0724   SpO2: 99% 92% 93%     SpO2:  [92 %-93 %] 93 %  on  Flow (L/min):  [2] 2;   Device (Oxygen Therapy): nasal cannula    Body mass index is 30.59 kg/m².  Wt Readings from Last 3 Encounters:   05/26/24 99.5 kg (219 lb 5.7 oz)   10/02/23 87.1 kg (192 lb)   09/22/23 87.1 kg (192 lb)        Intake/Output Summary (Last 24 hours) at 5/26/2024 1101  Last data filed at 5/25/2024 2300  Gross per 24 hour   Intake 917 ml   Output 25 ml   Net 892 ml     Diet: Regular/House; Fluid Consistency: Thin (IDDSI 0)  ----------------------------------------------------------------------------------------------------------------------      Physical Exam:    Constitutional: Chronically ill-appearing elderly gentleman.  On room air this morning.  Sitting up in bed watching TV  HENT:  Head: Normocephalic and atraumatic.  Mouth:  Moist mucous membranes.    Eyes:  Conjunctivae and EOM are normal.  No scleral icterus.  Neck:  Neck supple.  No JVD present.     Cardiovascular:  Normal rate, regular rhythm and normal heart sounds with 3/6 murmur. No edema.  Pulmonary/Chest: Clear to auscultation bilaterally.  abdominal:  Soft.  Bowel sounds are normal.  No distension and no tenderness.   Musculoskeletal:  No edema, no tenderness, and no deformity.  No swelling or redness of joints.  Neurological:  Alert and oriented to person, place, and time.  No facial droop.  No slurred speech.   Skin:  Skin is warm and dry.  No rash noted.  No pallor.  Right subclavian HD cath site dressed with occlusive dressing, no surrounding erythema or edema.  No drainage on the dressing.  LUE AVF with no erythema/edema, thrill and bruit.  Bilateral lower extremity chronic ischemic changes.  Left pedal pulse faint.  Dry ulcers noted to the left lateral foot and left heel.  Psychiatric:  Normal mood and affect.  Behavior is normal.        ----------------------------------------------------------------------------------------------------------------------                      Results from last 7 days   Lab Units 05/26/24  0110 05/25/24  0033 05/24/24  0113   WBC 10*3/mm3 12.41* 12.64* 9.98   HEMOGLOBIN g/dL 9.4* 9.9* 9.8*   HEMATOCRIT % 30.4* 31.4* 30.6*   MCV fL 96.8 95.4 93.9   MCHC g/dL 30.9* 31.5 32.0   PLATELETS 10*3/mm3 204 224 203     Results from last 7 days   Lab Units 05/26/24  0110 05/25/24  0033 05/24/24  0114 05/21/24  0321 05/20/24  0109   SODIUM mmol/L 139 137 137   < > 129*   POTASSIUM mmol/L 4.0 4.4 3.8   < > 3.9   MAGNESIUM mg/dL  --   --   --   --  2.1   CHLORIDE mmol/L 94* 93* 93*   < > 87*   CO2 mmol/L 31.3* 28.2 31.3*   < > 25.9   BUN mg/dL 25* 38* 27*   < > 56*   CREATININE mg/dL 5.20* 7.28* 5.14*   < > 8.29*   CALCIUM mg/dL 8.7 8.8 8.5*   < > 7.9*   GLUCOSE mg/dL 226* 165* 225*   < > 128*   ALBUMIN g/dL 3.3*  --  3.4*  --  3.1*   BILIRUBIN mg/dL 0.4  --  0.4  --  0.4   ALK PHOS U/L 176*  --  216*  --  197*   AST (SGOT) U/L 45*  --  30  --  23   ALT (SGPT) U/L 39  --  22  --  " 11    < > = values in this interval not displayed.   Estimated Creatinine Clearance: 14.8 mL/min (A) (by C-G formula based on SCr of 5.2 mg/dL (H)).  No results found for: \"AMMONIA\"    Glucose   Date/Time Value Ref Range Status   05/26/2024 0827 228 (H) 70 - 130 mg/dL Final   05/25/2024 1951 202 (H) 70 - 130 mg/dL Final   05/25/2024 1631 240 (H) 70 - 130 mg/dL Final   05/25/2024 1228 153 (H) 70 - 130 mg/dL Final   05/25/2024 0620 133 (H) 70 - 130 mg/dL Final   05/24/2024 2017 215 (H) 70 - 130 mg/dL Final   05/24/2024 1654 138 (H) 70 - 130 mg/dL Final   05/24/2024 1129 149 (H) 70 - 130 mg/dL Final     Lab Results   Component Value Date    HGBA1C 6.40 (H) 03/21/2024     Lab Results   Component Value Date    TSH 2.490 03/21/2024    FREET4 1.43 05/29/2022       Blood Culture   Date Value Ref Range Status   05/15/2024 Staphylococcus aureus, MRSA (C)  Preliminary     Comment:       Infectious disease consultation is highly recommended to rule out distant foci of infection.  Methicillin resistant Staphylococcus aureus, Patient may be an isolation risk.   05/15/2024 Staphylococcus aureus, MRSA (C)  Preliminary     Comment:       Infectious disease consultation is highly recommended to rule out distant foci of infection.  Methicillin resistant Staphylococcus aureus, Patient may be an isolation risk.     No results found for: \"URINECX\"  No results found for: \"WOUNDCX\"  No results found for: \"STOOLCX\"  No results found for: \"RESPCX\"  Pain Management Panel           No data to display                  ----------------------------------------------------------------------------------------------------------------------  Imaging Results (Last 24 Hours)       ** No results found for the last 24 hours. **            ----------------------------------------------------------------------------------------------------------------------    Pertinent Infectious Disease Results                Assessment/Plan       Assessment     Sepsis " on admission  MRSA bacteremia  Chronic left foot wounds        Plan      Patient resting comfortably in bed this morning.  No issues or complaints.  Currently on room air with no apparent distress.  Lungs clear to auscultation bilaterally.  Abdomen soft, nontender.  Afebrile, denies diarrhea.  WBC stable at 12.41.    GAVI from 5/22/2024 reports no evidence of vegetation.      In the setting of negative GAVI would recommend to continue vancomycin pharmacy to dose post hemodialysis to continue through 6/15/2024. We will continue to monitor closely.  Patient will require outpatient infectious disease follow-up.      ANTIMICROBIAL THERAPY    Vancomycin Pharmacy Intermittent/Pulse Dosing     Code Status:   Code Status and Medical Interventions:   Ordered at: 05/15/24 9531     Level Of Support Discussed With:    Patient     Code Status (Patient has no pulse and is not breathing):    CPR (Attempt to Resuscitate)     Medical Interventions (Patient has pulse or is breathing):    Full Support       DORA Wagner  05/26/24  11:01 EDT     DISPLAY PLAN FREE TEXT

## 2024-07-27 NOTE — ASU PREOP CHECKLIST - ALLERGIES REVIEWED
NEPHROLOGY PROGRESS NOTE   Ngozi Beard 66 y.o. 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  Anti-GBM on Saturday and Monday  No pulmonary involvement at this time  Continue plasmapheresis until next August 1     # Immunosuppression therapy  Methylprednisolone x 3, completed  Currently on prednisone 60 mg, started on 7/20  60 mg for 1 week, last dose today  40 mg for 1 week 7/27  30 mg for 1 week  25 mg for 2 weeks  20 mg for 2 weeks  15 mg for 2 weeks   12.5 mg for 2 weeks  10 mg for 2 weeks  7.5 mg for 2 weeks  Continue with 5 mg daily  Cytoxan 100 mg daily adjusted by kidney function and age.  Plan to continue for 3 months  Please give taper plan on discharge     # Prophylaxis  Continue with calcium and vitamin D  Pantoprazole 40  Continue with Bactrim 1 tablet 3 times a week        #Volume status/hypertension:  Volume: Fluid overload  Blood pressure: Hypertensive, /76   , goal less than 140/90   Recommend:  Ultrafiltration on dialysis   Continue  torsemide to 100 daily to force urinary  output although patient is anuric  Increase nifedipine 60 mg daily      # Anemia   Hemoglobin 7.3 mg/dL, trending down  No RISHI for now, transfusion if hemoglobin less than 7        #Acid-base Disorder  serum HCO3  24  At goal        The highlighted and/or bolded points in my assessment, plan, and disposition were discussed with the primary team and they agree with those points and the plan.  Previous records were personally reviewed by me to obtain a baseline creatinine.   The images (CXR) were personally reviewed by me in PACS      SUBJECTIVE:  Patient seen and examined at bedside. No chest pain, shortness of breath, nausea, vomiting, abdominal pain or diarrhea.     OBJECTIVE:  Current Weight: Weight - Scale: 119 kg (263 lb 3.7 oz)  Vitals:    07/27/24 0306   BP:    Pulse:    Resp:    Temp:    SpO2: 94%       Intake/Output Summary (Last 24 hours) at 7/27/2024 0636  Last data filed at 7/26/2024 1725  Gross per 24 hour   Intake 560 ml   Output --   Net 560 ml     Wt Readings from Last 3 Encounters:   07/26/24 119 kg (263 lb 3.7 oz)   07/10/24 109 kg (241 lb)   07/01/24 111 kg (244 lb 6.4 oz)     Temp Readings from Last 3 Encounters:   07/27/24 98.4 °F (36.9 °C) (Oral)   07/10/24 99.4 °F (37.4 °C) (Oral)   07/01/24 98.2 °F (36.8 °C) (Tympanic)     BP Readings from Last 3 Encounters:   07/27/24 149/76   07/12/24 127/59   07/01/24 124/80     Pulse Readings from Last 3 Encounters:   07/27/24 86   07/12/24 72   07/01/24 62        General:  no acute distress at this time  Skin:  No acute rash  Eyes:  No scleral icterus and noninjected  ENT:  mucous membranes moist  Neck:  no carotid bruits  Chest:  Clear to auscultation percussion, good respiratory effort, no use of accessory respiratory muscles  CVS:  Regular rate and rhythm without rub   Abdomen:  soft and nontender   Extremities: lower extremity edema  Neuro:  No gross focality  Psych:  Alert , cooperative  Dialysis access: PermCath      Medications:    Current  Facility-Administered Medications:     acetaminophen (TYLENOL) tablet 650 mg, 650 mg, Oral, Q6H PRN, Tram Palacios PA-C, 650 mg at 07/19/24 1302    [START ON 7/28/2024] albumin human 200 g 5% IVPB, 200 g, Intravenous, Daily, Joselyn Reyes Bahamonde, MD    albuterol (PROVENTIL HFA,VENTOLIN HFA) inhaler 2 puff, 2 puff, Inhalation, Q4H PRN, Tram Palacios PA-C, 2 puff at 07/23/24 1114    atorvastatin (LIPITOR) tablet 20 mg, 20 mg, Oral, Daily, Tram Palacios PA-C, 20 mg at 07/26/24 0806    benzonatate (TESSALON PERLES) capsule 200 mg, 200 mg, Oral, TID PRN, Tram Palacios PA-C    bisacodyl (DULCOLAX) rectal suppository 10 mg, 10 mg, Rectal, Daily PRN, Daya May DO, 10 mg at 07/25/24 1417    cyclophosphamide (CYTOXAN) capsule 100 mg, 100 mg, Oral, Daily, Tirso Montez MD, 100 mg at 07/26/24 2144    dextromethorphan-guaiFENesin (ROBITUSSIN DM) oral syrup 10 mL, 10 mL, Oral, Q4H PRN, Tram Palacios PA-C    famotidine (PEPCID) tablet 10 mg, 10 mg, Oral, Daily PRN, Daya May DO, 10 mg at 07/26/24 2143    fluticasone-vilanterol 200-25 mcg/actuation 1 puff, 1 puff, Inhalation, Daily, Tram Palacios PA-C, 1 puff at 07/26/24 0720    heparin (porcine) subcutaneous injection 5,000 Units, 5,000 Units, Subcutaneous, Q8H JACK, Xavier Medina MD, 5,000 Units at 07/27/24 0612    insulin lispro (HumALOG/ADMELOG) 100 units/mL subcutaneous injection 2-12 Units, 2-12 Units, Subcutaneous, TID AC, 2 Units at 07/26/24 1805 **AND** Fingerstick Glucose (POCT), , , TID AC, Yasmine Meadows MD    ipratropium-albuterol (DUO-NEB) 0.5-2.5 mg/3 mL inhalation solution 3 mL, 3 mL, Nebulization, Q6H, Luke Montez MD, 3 mL at 07/27/24 0305    iron polysaccharides (FERREX) capsule 150 mg, 150 mg, Oral, Daily, Ata Burns MD, 150 mg at 07/26/24 0806    lamoTRIgine (LaMICtal) tablet 100 mg, 100 mg, Oral, QAM, Tram Palacios PA-C, 100 mg at 07/26/24 0806    loratadine (CLARITIN) tablet 10 mg, 10 mg, Oral, Daily, Tram Palacios PA-C, 10 mg at  07/26/24 0806    melatonin tablet 3 mg, 3 mg, Oral, Daily PRN, Yasmine Meadows MD, 3 mg at 07/21/24 2111    montelukast (SINGULAIR) tablet 10 mg, 10 mg, Oral, Daily, Tram Palacios PA-C, 10 mg at 07/26/24 0806    NIFEdipine (PROCARDIA XL) 24 hr tablet 60 mg, 60 mg, Oral, Daily, Joselyn Reyes Bahamonde, MD    ondansetron (ZOFRAN) injection 4 mg, 4 mg, Intravenous, Q6H PRN, Yasmine Meadows MD, 4 mg at 07/21/24 1840    oxybutynin (DITROPAN-XL) 24 hr tablet 5 mg, 5 mg, Oral, Daily, Tram Palacios PA-C, 5 mg at 07/26/24 0806    pantoprazole (PROTONIX) EC tablet 40 mg, 40 mg, Oral, Daily Before Breakfast, Ronny Webster MD, 40 mg at 07/27/24 0612    polyethylene glycol (MIRALAX) packet 17 g, 17 g, Oral, Daily, Xavier Medina MD, 17 g at 07/26/24 0806    predniSONE tablet 40 mg, 40 mg, Oral, Daily, Joselyn Reyes Bahamonde, MD    senna (SENOKOT) tablet 17.2 mg, 2 tablet, Oral, BID, Yasmine Meadows MD, 17.2 mg at 07/26/24 1804    sevelamer (RENAGEL) tablet 1,600 mg, 1,600 mg, Oral, TID With Meals, Vane Estrada MD, 1,600 mg at 07/26/24 1804    Sodium Zirconium Cyclosilicate (Lokelma) 10 g, 10 g, Oral, Daily, Tirso Montez MD, 10 g at 07/24/24 0839    sulfamethoxazole-trimethoprim (BACTRIM) 400-80 mg per tablet 1 tablet, 1 tablet, Oral, Once per day on Monday Wednesday Friday, Joselyn Reyes Bahamonde, MD, 1 tablet at 07/26/24 0806    torsemide (DEMADEX) tablet 100 mg, 100 mg, Oral, Daily, Joselyn Reyes Bahamonde, MD, 100 mg at 07/26/24 0806    traZODone (DESYREL) tablet 200 mg, 200 mg, Oral, HS, Tram Palacios PA-C, 200 mg at 07/26/24 2144    venlafaxine (EFFEXOR-XR) 24 hr capsule 150 mg, 150 mg, Oral, Daily, Tram Palacios PA-C, 150 mg at 07/26/24 0807    Laboratory Results:  Results from last 7 days   Lab Units 07/26/24  1536 07/26/24  0504 07/24/24  0632 07/23/24  0537 07/22/24  0429 07/21/24  0433   WBC Thousand/uL  --  24.22* 24.77* 22.40* 18.05* 19.41*   HEMOGLOBIN g/dL 7.3* 7.2* 8.3* 8.9* 8.9* 8.9*   HEMATOCRIT % 23.0* 22.7*  "25.7* 27.5* 28.4* 27.0*   PLATELETS Thousands/uL  --  139* 143* 149 179 202   SODIUM mmol/L  --   --   --  138 140 139   POTASSIUM mmol/L  --   --   --  4.4 5.5* 5.1   CHLORIDE mmol/L  --   --   --  102 107 105   CO2 mmol/L  --   --   --  24 20* 23   BUN mg/dL  --   --   --  67* 101* 82*   CREATININE mg/dL  --   --   --  6.34* 8.53* 7.23*   CALCIUM mg/dL  --   --   --  9.3 9.3 9.1   PHOSPHORUS mg/dL  --   --   --   --  8.4* 7.6*       IR tunneled dialysis catheter placement   Final Result by Graciela Bettencourt MD (07/22 1018)      Conversion of right-sided internal jugular non-tunneled central venous catheter for a tunneled dialysis catheter, with tip in the expected location of the right atrium.      Plan:      The catheter may be used immediately.      Workstation performed: [id number]         IR biopsy kidney random   Final Result by Sandoval Castro MD (07/18 1358)   Impression: Successful percutaneous nontarget renal biopsy as described.                  Workstation performed: [id number]         IR temporary dialysis catheter placement   Final Result by Graciela Bettencourt MD (07/15 1640)      Insertion of right-sided non-tunneled dual-lumen temporary dialysis catheter, with tip in the expected location of the cavoatrial junction.      Plan:      The catheter may be used immediately.      Workstation performed: [id number]             Portions of the record may have been created with voice recognition software. Occasional wrong word or \"sound a like\" substitutions may have occurred due to the inherent limitations of voice recognition software. Read the chart carefully and recognize, using context, where substitutions have occurred.    " done

## 2024-08-19 ENCOUNTER — APPOINTMENT (OUTPATIENT)
Dept: OTOLARYNGOLOGY | Facility: CLINIC | Age: 64
End: 2024-08-19
Payer: MEDICARE

## 2024-08-19 VITALS — WEIGHT: 158 LBS | BODY MASS INDEX: 26.98 KG/M2 | HEIGHT: 64 IN

## 2024-08-19 DIAGNOSIS — H61.20 IMPACTED CERUMEN, UNSPECIFIED EAR: ICD-10-CM

## 2024-08-19 DIAGNOSIS — H71.02 CHOLESTEATOMA OF ATTIC, LEFT EAR: ICD-10-CM

## 2024-08-19 DIAGNOSIS — H90.A32 MIXED CONDUCTIVE AND SENSORINEURAL HEARING, UNILATERAL, LEFT EAR WITH RESTRICTED HEARING ON THE  CONTRALATERAL SIDE: ICD-10-CM

## 2024-08-19 DIAGNOSIS — H70.12 CHRONIC MASTOIDITIS, LEFT EAR: ICD-10-CM

## 2024-08-19 DIAGNOSIS — H93.299 OTHER ABNORMAL AUDITORY PERCEPTIONS, UNSPECIFIED EAR: ICD-10-CM

## 2024-08-19 PROCEDURE — 69220 CLEAN OUT MASTOID CAVITY: CPT | Mod: LT

## 2024-08-19 PROCEDURE — 92550 TYMPANOMETRY & REFLEX THRESH: CPT | Mod: 52

## 2024-08-19 PROCEDURE — 92557 COMPREHENSIVE HEARING TEST: CPT

## 2024-08-19 PROCEDURE — 99214 OFFICE O/P EST MOD 30 MIN: CPT | Mod: 25

## 2024-08-19 NOTE — CONSULT LETTER
[Please see my note below.] : Please see my note below. [FreeTextEntry2] : Dear ISAIAH AGUILAR  [FreeTextEntry1] : Thank you for allowing me to participate in the care of ANNE LYNNE . Please see the attached visit note.    Antwan Ascencio Otology Medical Director of [facility] Department of Otolaryngology [facility]

## 2024-08-19 NOTE — ASSESSMENT
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  CT scan reviewed which is not indicating clear evidence for recurrent cholesteatoma.  We discussed this in detail.  Clinical monitoring with mastoid maintenance recommended.  Significant hearing loss is present in the left ear.  We discussed management options including but not limited to Baha implantation.  I have referred him for audiology counseling regarding this.  If he wishes to proceed, we can discuss Baha implant in further detail.  Ear hygiene reviewed in detail.  Follow up recommended if symptoms persist or progresses.  Routine follow up for cerumen management suggested.

## 2024-08-19 NOTE — DATA REVIEWED
[de-identified] : Complete audiometry was ordered and completed today. This was separately reported by the audiologist. The results were reviewed in detail with the patient. Significant conductive hearing loss affecting the left ear.  Mild to moderate hearing loss on the right. [de-identified] : Prior operative records reviewed [de-identified] : CT scan from 2023 reviewed

## 2024-08-19 NOTE — HISTORY OF PRESENT ILLNESS
[de-identified] : ANNE LYNNE has a history of left sided cholesteatoma. He presents today complaining of intermittent left sided postauricular pain and mastoid cavity debridement. He does not use otic drops. He uses bilateral amplification, but has lost the left hearing aid. He has been using the right hearing aid for the left ear. He denies any hearing changes or dizziness. 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  11/2021 - Left sided mastoidectomy  11/2022 - left sided revision mastoidectomy + tympanoplasty

## 2024-08-19 NOTE — PHYSICAL EXAM
[Normal] : mucosa is normal [Midline] : trachea located in midline position [FreeTextEntry1] : Microscopic ear exam with left mastoid debridement, right cerumen debridement:  Right ear: Obstructing cerumen was debrided from the ear canal using suction, and curet. The ear canal was otherwise within normal limits. The tympanic membrane was intact and noninflamed.  Left ear: Canal down cavity.  Retained keratin and dry cerumen debrided with alligator forceps and curette.  The cavity is skin lined.  Posterior tympanic membrane retraction contacting the promontory and oval window region without keratin retention or inflammation.  No visible skin ingrowth.

## 2024-08-29 ENCOUNTER — APPOINTMENT (OUTPATIENT)
Dept: OTOLARYNGOLOGY | Facility: CLINIC | Age: 64
End: 2024-08-29
Payer: MEDICARE

## 2024-08-29 PROCEDURE — V5010 ASSESSMENT FOR HEARING AID: CPT | Mod: GY

## 2024-09-09 ENCOUNTER — NON-APPOINTMENT (OUTPATIENT)
Age: 64
End: 2024-09-09

## 2024-09-10 ENCOUNTER — APPOINTMENT (OUTPATIENT)
Dept: NEUROLOGY | Facility: CLINIC | Age: 64
End: 2024-09-10
Payer: MEDICARE

## 2024-09-10 VITALS
WEIGHT: 159 LBS | SYSTOLIC BLOOD PRESSURE: 122 MMHG | BODY MASS INDEX: 27.14 KG/M2 | HEART RATE: 87 BPM | OXYGEN SATURATION: 96 % | TEMPERATURE: 97.6 F | DIASTOLIC BLOOD PRESSURE: 72 MMHG | HEIGHT: 64 IN

## 2024-09-10 DIAGNOSIS — G20.C PARKINSONISM, UNSPECIFIED: ICD-10-CM

## 2024-09-10 DIAGNOSIS — H71.02 CHOLESTEATOMA OF ATTIC, LEFT EAR: ICD-10-CM

## 2024-09-10 DIAGNOSIS — G25.2 OTHER SPECIFIED FORMS OF TREMOR: ICD-10-CM

## 2024-09-10 PROCEDURE — 99204 OFFICE O/P NEW MOD 45 MIN: CPT

## 2024-09-10 NOTE — PHYSICAL EXAM
[Person] : oriented to person [Place] : oriented to place [Time] : oriented to time [Concentration Intact] : normal concentrating ability [Visual Intact] : visual attention was ~T not ~L decreased [Naming Objects] : no difficulty naming common objects [Repeating Phrases] : no difficulty repeating a phrase [Writing A Sentence] : no difficulty writing a sentence [Fluency] : fluency intact [Comprehension] : comprehension intact [Reading] : reading intact [Past History] : adequate knowledge of personal past history [Cranial Nerves Optic (II)] : visual acuity intact bilaterally,  visual fields full to confrontation, pupils equal round and reactive to light [Cranial Nerves Oculomotor (III)] : extraocular motion intact [Cranial Nerves Trigeminal (V)] : facial sensation intact symmetrically [Cranial Nerves Facial (VII)] : face symmetrical [Cranial Nerves Glossopharyngeal (IX)] : tongue and palate midline [Motor Tone] : muscle tone was normal in all four extremities [Motor Strength] : muscle strength was normal in all four extremities [No Muscle Atrophy] : normal bulk in all four extremities [Motor Handedness Right-Handed] : the patient is right hand dominant [Sensation Tactile Decrease] : light touch was intact [Abnormal Walk] : normal gait [2+] : Ankle jerk left 2+ [Past-pointing] : there was no past-pointing [Tremor] : no tremor present [Plantar Reflex Right Only] : normal on the right [Plantar Reflex Left Only] : normal on the left [FreeTextEntry5] : Left hearing loss 85% better [FreeTextEntry6] : R arm less swing. No other PD features

## 2024-09-10 NOTE — HISTORY OF PRESENT ILLNESS
[FreeTextEntry1] : Follow up  9/10/24     Not seen since 8/2021  PMHx  64 year old, right handed, male here today for evaluation for memory issues. Dr. Humphrey Wright referred him to neurology for memory loss.  He had memory issues before having COVID but they are much worse after COVID. Things like he will not be able to remember what he ate for breakfast the day before.   He had COVID in April, [facility] was here for three weeks [facility] He is still short of breath but not as bad as it was in the past. There is also some dizziness.   Also complains of headaches and is taking tylenol and naproxyn every day to help with the headaches.  Also has an action tremor for the last five years. It does not happen at rest. The last three months he started having a hard time carrying stuff cause of the tremor.   There is also dizziness and balance issues. He can only sleep for 3 hours during the night and takes ambien almost every night. He sleeps for 15 minutes on and off throughout the day. 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   Diabetes for thirty years.  HPI     9/10/24  Not seen for over 3 years Stable neuro except for Left hearing loss, memory, postcovid and now r hand tremor  DM Sleep well  Meds Escitalopram 5 mg Trazodone 150 mg qhs Famotidine 40 mg Metformin Lisinopril DM meds  HPI    8/16/22  Doing well. Had ENT surgery Memory problems  Sometimes he gets lost.  R arm pain. Back pain   HPI   3/15/22  MRI: no intracranial pathology. Has severe Left tympani inflammatory changes EEG normal SAM scan normal .  Sleeping better w up Trazodone 150 mg/day Seen Dr Marquez who did surgery this week:  Mixed conductive and sensorineural hearing loss of left ear with restricted hearing of right ear (389.22) (H90.A32) Cholesteatoma of attic of left ear (385.31) (H71.02) Chronic mastoiditis of left side (383.1) (H70.12)   Sleeps well now Mood down Mood better on Citalopram Feeling better overall Knee pain .  Vaccines x 3.

## 2024-09-10 NOTE — DISCUSSION/SUMMARY
[FreeTextEntry1] : 64 year old male w tremor vs PD syndrome. Mild Memory 2/3. Stable  Left hearing loss

## 2024-09-27 ENCOUNTER — APPOINTMENT (OUTPATIENT)
Dept: NUCLEAR MEDICINE | Facility: HOSPITAL | Age: 64
End: 2024-09-27

## 2024-09-27 ENCOUNTER — OUTPATIENT (OUTPATIENT)
Dept: OUTPATIENT SERVICES | Facility: HOSPITAL | Age: 64
LOS: 1 days | End: 2024-09-27
Payer: MEDICARE

## 2024-09-27 DIAGNOSIS — Z98.890 OTHER SPECIFIED POSTPROCEDURAL STATES: Chronic | ICD-10-CM

## 2024-09-27 DIAGNOSIS — Z41.9 ENCOUNTER FOR PROCEDURE FOR PURPOSES OTHER THAN REMEDYING HEALTH STATE, UNSPECIFIED: Chronic | ICD-10-CM

## 2024-09-27 DIAGNOSIS — Z90.49 ACQUIRED ABSENCE OF OTHER SPECIFIED PARTS OF DIGESTIVE TRACT: Chronic | ICD-10-CM

## 2024-09-27 PROCEDURE — 78803 RP LOCLZJ TUM SPECT 1 AREA: CPT | Mod: 26

## 2024-09-27 PROCEDURE — A9584: CPT

## 2024-09-27 PROCEDURE — 78803 RP LOCLZJ TUM SPECT 1 AREA: CPT

## 2024-11-12 ENCOUNTER — APPOINTMENT (OUTPATIENT)
Dept: NEUROLOGY | Facility: CLINIC | Age: 64
End: 2024-11-12
Payer: MEDICARE

## 2024-11-12 VITALS
BODY MASS INDEX: 27.09 KG/M2 | OXYGEN SATURATION: 96 % | HEART RATE: 85 BPM | WEIGHT: 157.8 LBS | DIASTOLIC BLOOD PRESSURE: 74 MMHG | SYSTOLIC BLOOD PRESSURE: 117 MMHG | TEMPERATURE: 97.9 F

## 2024-11-12 DIAGNOSIS — G25.2 OTHER SPECIFIED FORMS OF TREMOR: ICD-10-CM

## 2024-11-12 PROCEDURE — G2211 COMPLEX E/M VISIT ADD ON: CPT

## 2024-11-12 PROCEDURE — 99214 OFFICE O/P EST MOD 30 MIN: CPT

## 2024-11-12 RX ORDER — FENOFIBRATE 130 MG/1
130 CAPSULE ORAL
Refills: 0 | Status: ACTIVE | COMMUNITY

## 2024-11-12 RX ORDER — METFORMIN HYDROCHLORIDE 1000 MG/1
1000 TABLET, COATED ORAL TWICE DAILY
Refills: 0 | Status: ACTIVE | COMMUNITY

## 2024-11-12 RX ORDER — NAPROXEN 500 MG/1
500 TABLET ORAL
Refills: 0 | Status: ACTIVE | COMMUNITY

## 2024-11-12 RX ORDER — FAMOTIDINE 40 MG/1
40 TABLET, FILM COATED ORAL DAILY
Refills: 0 | Status: ACTIVE | COMMUNITY

## 2024-11-12 RX ORDER — MAGNESIUM OXIDE 400 MG/1
400 TABLET ORAL DAILY
Refills: 0 | Status: ACTIVE | COMMUNITY

## 2024-11-12 RX ORDER — GLIMEPIRIDE 4 MG/1
4 TABLET ORAL TWICE DAILY
Refills: 0 | Status: ACTIVE | COMMUNITY

## 2024-11-12 RX ORDER — LISINOPRIL 10 MG/1
10 TABLET ORAL
Refills: 0 | Status: ACTIVE | COMMUNITY

## 2024-11-12 RX ORDER — EMPAGLIFLOZIN 25 MG/1
25 TABLET, FILM COATED ORAL DAILY
Refills: 0 | Status: ACTIVE | COMMUNITY

## 2024-11-12 RX ORDER — SITAGLIPTIN 100 MG/1
100 TABLET, FILM COATED ORAL DAILY
Refills: 0 | Status: ACTIVE | COMMUNITY

## 2024-11-12 RX ORDER — ROSUVASTATIN CALCIUM 10 MG/1
10 TABLET, FILM COATED ORAL DAILY
Refills: 0 | Status: ACTIVE | COMMUNITY

## 2025-01-29 NOTE — ED ADULT NURSE NOTE - EENT ASSESSMENT, MLM
They received 2 doses it looks like Dr. Aguilar cancelled the 20 mg and ordered 10 mg. Understood had no other question   - - -

## 2025-03-11 ENCOUNTER — APPOINTMENT (OUTPATIENT)
Dept: NEUROLOGY | Facility: CLINIC | Age: 65
End: 2025-03-11
Payer: MEDICARE

## 2025-03-11 VITALS
HEIGHT: 64 IN | DIASTOLIC BLOOD PRESSURE: 71 MMHG | TEMPERATURE: 98 F | SYSTOLIC BLOOD PRESSURE: 126 MMHG | HEART RATE: 86 BPM | WEIGHT: 158 LBS | OXYGEN SATURATION: 96 % | BODY MASS INDEX: 26.98 KG/M2

## 2025-03-11 DIAGNOSIS — G25.2 OTHER SPECIFIED FORMS OF TREMOR: ICD-10-CM

## 2025-03-11 DIAGNOSIS — H91.90 UNSPECIFIED HEARING LOSS, UNSPECIFIED EAR: ICD-10-CM

## 2025-03-11 PROCEDURE — 99214 OFFICE O/P EST MOD 30 MIN: CPT

## 2025-03-11 PROCEDURE — G2211 COMPLEX E/M VISIT ADD ON: CPT

## 2025-04-03 ENCOUNTER — APPOINTMENT (OUTPATIENT)
Dept: OTOLARYNGOLOGY | Facility: CLINIC | Age: 65
End: 2025-04-03
Payer: MEDICARE

## 2025-04-03 DIAGNOSIS — H90.A32 MIXED CONDUCTIVE AND SENSORINEURAL HEARING, UNILATERAL, LEFT EAR WITH RESTRICTED HEARING ON THE  CONTRALATERAL SIDE: ICD-10-CM

## 2025-04-03 DIAGNOSIS — H70.12 CHRONIC MASTOIDITIS, LEFT EAR: ICD-10-CM

## 2025-04-03 DIAGNOSIS — H61.20 IMPACTED CERUMEN, UNSPECIFIED EAR: ICD-10-CM

## 2025-04-03 PROCEDURE — 99213 OFFICE O/P EST LOW 20 MIN: CPT | Mod: 25

## 2025-04-03 PROCEDURE — 69220 CLEAN OUT MASTOID CAVITY: CPT | Mod: LT

## 2025-06-10 NOTE — ED PROVIDER NOTE - OBJECTIVE STATEMENT
The pt is 62 y/o M, who presents to ED c/o rash to L chest and back x 2 d. Pt states that the rash is painful, burning. has not taken any meds for symptoms. unsure if had chickenpox as child. Denies fevers, chills, cp, sob, n/v, bleeding, pus. How Many Skin Cancers Have You Had?: more than one What Is The Reason For Today's Visit?: History of Non-Melanoma Skin Cancer When Was Your Last Cancer Diagnosed?: 2024

## 2025-09-16 ENCOUNTER — APPOINTMENT (OUTPATIENT)
Dept: NEUROLOGY | Facility: CLINIC | Age: 65
End: 2025-09-16
Payer: MEDICARE

## 2025-09-16 VITALS
HEIGHT: 64 IN | TEMPERATURE: 97.7 F | OXYGEN SATURATION: 96 % | BODY MASS INDEX: 26.29 KG/M2 | HEART RATE: 90 BPM | DIASTOLIC BLOOD PRESSURE: 77 MMHG | WEIGHT: 154 LBS | SYSTOLIC BLOOD PRESSURE: 122 MMHG

## 2025-09-16 DIAGNOSIS — G25.2 OTHER SPECIFIED FORMS OF TREMOR: ICD-10-CM

## 2025-09-16 DIAGNOSIS — H71.02 CHOLESTEATOMA OF ATTIC, LEFT EAR: ICD-10-CM

## 2025-09-16 PROCEDURE — G2211 COMPLEX E/M VISIT ADD ON: CPT

## 2025-09-16 PROCEDURE — 99214 OFFICE O/P EST MOD 30 MIN: CPT

## (undated) DEVICE — Device

## (undated) DEVICE — BUR ANSPACH BALL FLUTED EXT 3MM

## (undated) DEVICE — DRAPE NEUROLOGICAL

## (undated) DEVICE — DURABLE MEDICAL EQUIPMENT: Type: DURABLE MEDICAL EQUIPMENT

## (undated) DEVICE — SLV COMPRESSION KNEE MED

## (undated) DEVICE — DRILL BIT ANSPACH DIAMOND BALL 4MM X 25.4MM

## (undated) DEVICE — CATH IV SAFE ACUVANCE 14G X 2" (ORANGE)

## (undated) DEVICE — DRILL BIT ANSPACH FLUTED BALL 4MM X 5CM

## (undated) DEVICE — DRSG TEGADERM 2.5X3"

## (undated) DEVICE — BUR ANSPACH BALL FLUTED 6MM

## (undated) DEVICE — DRILL BIT ANSPACH DIAMOND BALL 3MMX5CM

## (undated) DEVICE — SOL IRR BAG NS 0.9% 3000ML

## (undated) DEVICE — NDL BLD BEAVER CUTTING EDGE 3.0MM

## (undated) DEVICE — DRSG MASTISOL

## (undated) DEVICE — DRAPE TOWEL 1000 SMALL 17" X 11"

## (undated) DEVICE — GOWN XXL

## (undated) DEVICE — WARMING BLANKET LOWER ADULT

## (undated) DEVICE — BLADE TYMPANOPLASTY 2.5MM W 60 DEGREE BEVEL DOWN

## (undated) DEVICE — DRILL BIT ANSPACH FLUTED ACORN 5MMX25.4MM

## (undated) DEVICE — DRILL BIT ANSPACH DIAMOND BALL 2MMX5CM

## (undated) DEVICE — BUR ANSPACH BALL FLUTED 7MM

## (undated) DEVICE — PACK TYMPANOMASTOIDECTOMY LF

## (undated) DEVICE — GLV 7.5 PROTEXIS (WHITE)

## (undated) DEVICE — ELCTR NDL SUBDERMAL 2 CHANNEL

## (undated) DEVICE — DRAPE VARI-LENS2 MICROSCPOPE 68MM